# Patient Record
Sex: FEMALE | Race: WHITE | NOT HISPANIC OR LATINO | Employment: UNEMPLOYED | URBAN - METROPOLITAN AREA
[De-identification: names, ages, dates, MRNs, and addresses within clinical notes are randomized per-mention and may not be internally consistent; named-entity substitution may affect disease eponyms.]

---

## 2017-01-03 ENCOUNTER — APPOINTMENT (OUTPATIENT)
Dept: PHYSICAL THERAPY | Facility: CLINIC | Age: 57
End: 2017-01-03
Payer: MEDICARE

## 2017-01-03 PROCEDURE — G8978 MOBILITY CURRENT STATUS: HCPCS

## 2017-01-03 PROCEDURE — 97161 PT EVAL LOW COMPLEX 20 MIN: CPT

## 2017-01-03 PROCEDURE — 97140 MANUAL THERAPY 1/> REGIONS: CPT

## 2017-01-03 PROCEDURE — G8979 MOBILITY GOAL STATUS: HCPCS

## 2017-01-10 ENCOUNTER — ALLSCRIPTS OFFICE VISIT (OUTPATIENT)
Dept: OTHER | Facility: OTHER | Age: 57
End: 2017-01-10

## 2017-01-12 ENCOUNTER — ALLSCRIPTS OFFICE VISIT (OUTPATIENT)
Dept: OTHER | Facility: OTHER | Age: 57
End: 2017-01-12

## 2017-01-17 ENCOUNTER — ALLSCRIPTS OFFICE VISIT (OUTPATIENT)
Dept: OTHER | Facility: OTHER | Age: 57
End: 2017-01-17

## 2017-01-24 ENCOUNTER — ALLSCRIPTS OFFICE VISIT (OUTPATIENT)
Dept: OTHER | Facility: OTHER | Age: 57
End: 2017-01-24

## 2017-02-09 ENCOUNTER — GENERIC CONVERSION - ENCOUNTER (OUTPATIENT)
Dept: OTHER | Facility: OTHER | Age: 57
End: 2017-02-09

## 2017-02-14 ENCOUNTER — ALLSCRIPTS OFFICE VISIT (OUTPATIENT)
Dept: OTHER | Facility: OTHER | Age: 57
End: 2017-02-14

## 2017-02-21 ENCOUNTER — ALLSCRIPTS OFFICE VISIT (OUTPATIENT)
Dept: OTHER | Facility: OTHER | Age: 57
End: 2017-02-21

## 2017-02-28 ENCOUNTER — ALLSCRIPTS OFFICE VISIT (OUTPATIENT)
Dept: OTHER | Facility: OTHER | Age: 57
End: 2017-02-28

## 2017-03-07 ENCOUNTER — ALLSCRIPTS OFFICE VISIT (OUTPATIENT)
Dept: OTHER | Facility: OTHER | Age: 57
End: 2017-03-07

## 2017-03-21 ENCOUNTER — ALLSCRIPTS OFFICE VISIT (OUTPATIENT)
Dept: OTHER | Facility: OTHER | Age: 57
End: 2017-03-21

## 2017-03-28 ENCOUNTER — ALLSCRIPTS OFFICE VISIT (OUTPATIENT)
Dept: OTHER | Facility: OTHER | Age: 57
End: 2017-03-28

## 2017-04-10 ENCOUNTER — ALLSCRIPTS OFFICE VISIT (OUTPATIENT)
Dept: OTHER | Facility: OTHER | Age: 57
End: 2017-04-10

## 2017-04-11 ENCOUNTER — ALLSCRIPTS OFFICE VISIT (OUTPATIENT)
Dept: OTHER | Facility: OTHER | Age: 57
End: 2017-04-11

## 2017-04-18 ENCOUNTER — ALLSCRIPTS OFFICE VISIT (OUTPATIENT)
Dept: OTHER | Facility: OTHER | Age: 57
End: 2017-04-18

## 2017-04-25 ENCOUNTER — ALLSCRIPTS OFFICE VISIT (OUTPATIENT)
Dept: OTHER | Facility: OTHER | Age: 57
End: 2017-04-25

## 2017-05-02 ENCOUNTER — ALLSCRIPTS OFFICE VISIT (OUTPATIENT)
Dept: OTHER | Facility: OTHER | Age: 57
End: 2017-05-02

## 2017-05-09 ENCOUNTER — ALLSCRIPTS OFFICE VISIT (OUTPATIENT)
Dept: OTHER | Facility: OTHER | Age: 57
End: 2017-05-09

## 2017-05-23 ENCOUNTER — ALLSCRIPTS OFFICE VISIT (OUTPATIENT)
Dept: OTHER | Facility: OTHER | Age: 57
End: 2017-05-23

## 2017-05-30 ENCOUNTER — ALLSCRIPTS OFFICE VISIT (OUTPATIENT)
Dept: OTHER | Facility: OTHER | Age: 57
End: 2017-05-30

## 2017-06-06 ENCOUNTER — ALLSCRIPTS OFFICE VISIT (OUTPATIENT)
Dept: OTHER | Facility: OTHER | Age: 57
End: 2017-06-06

## 2017-06-13 ENCOUNTER — ALLSCRIPTS OFFICE VISIT (OUTPATIENT)
Dept: OTHER | Facility: OTHER | Age: 57
End: 2017-06-13

## 2017-06-20 ENCOUNTER — ALLSCRIPTS OFFICE VISIT (OUTPATIENT)
Dept: OTHER | Facility: OTHER | Age: 57
End: 2017-06-20

## 2017-06-27 ENCOUNTER — ALLSCRIPTS OFFICE VISIT (OUTPATIENT)
Dept: OTHER | Facility: OTHER | Age: 57
End: 2017-06-27

## 2017-07-11 ENCOUNTER — ALLSCRIPTS OFFICE VISIT (OUTPATIENT)
Dept: OTHER | Facility: OTHER | Age: 57
End: 2017-07-11

## 2017-07-19 ENCOUNTER — ALLSCRIPTS OFFICE VISIT (OUTPATIENT)
Dept: OTHER | Facility: OTHER | Age: 57
End: 2017-07-19

## 2017-07-19 ENCOUNTER — APPOINTMENT (OUTPATIENT)
Dept: LAB | Facility: CLINIC | Age: 57
End: 2017-07-19
Payer: MEDICARE

## 2017-07-19 DIAGNOSIS — F31.63 BIPOLAR DISORDER, CURRENT EPISODE MIXED, SEVERE, WITHOUT PSYCHOTIC FEATURES (HCC): ICD-10-CM

## 2017-07-19 DIAGNOSIS — G43.709 CHRONIC MIGRAINE WITHOUT AURA WITHOUT STATUS MIGRAINOSUS, NOT INTRACTABLE: ICD-10-CM

## 2017-07-19 DIAGNOSIS — K59.09 OTHER CONSTIPATION: ICD-10-CM

## 2017-07-19 DIAGNOSIS — R51 HEADACHE(784.0): ICD-10-CM

## 2017-07-19 DIAGNOSIS — M14.679 CHARCOT'S JOINT, UNSPECIFIED ANKLE AND FOOT: ICD-10-CM

## 2017-07-19 DIAGNOSIS — I10 ESSENTIAL (PRIMARY) HYPERTENSION: ICD-10-CM

## 2017-07-19 DIAGNOSIS — G89.4 CHRONIC PAIN SYNDROME: ICD-10-CM

## 2017-07-19 DIAGNOSIS — F41.9 ANXIETY DISORDER: ICD-10-CM

## 2017-07-19 DIAGNOSIS — G47.00 INSOMNIA: ICD-10-CM

## 2017-07-19 DIAGNOSIS — G44.40 DRUG-INDUCED HEADACHE, NOT ELSEWHERE CLASSIFIED, NOT INTRACTABLE: ICD-10-CM

## 2017-07-19 DIAGNOSIS — H81.10 BENIGN PAROXYSMAL VERTIGO: ICD-10-CM

## 2017-07-19 DIAGNOSIS — D72.819 DECREASED WHITE BLOOD CELL COUNT: ICD-10-CM

## 2017-07-19 DIAGNOSIS — M54.81 OCCIPITAL NEURALGIA: ICD-10-CM

## 2017-07-19 DIAGNOSIS — E78.5 HYPERLIPIDEMIA: ICD-10-CM

## 2017-07-19 DIAGNOSIS — E87.1 HYPO-OSMOLALITY AND HYPONATREMIA: ICD-10-CM

## 2017-07-19 LAB
ALBUMIN SERPL BCP-MCNC: 3.7 G/DL (ref 3.5–5)
ALP SERPL-CCNC: 87 U/L (ref 46–116)
ALT SERPL W P-5'-P-CCNC: 24 U/L (ref 12–78)
ANION GAP SERPL CALCULATED.3IONS-SCNC: 8 MMOL/L (ref 4–13)
AST SERPL W P-5'-P-CCNC: 17 U/L (ref 5–45)
BACTERIA UR QL AUTO: NORMAL /HPF
BASOPHILS # BLD AUTO: 0.02 THOUSANDS/ΜL (ref 0–0.1)
BASOPHILS NFR BLD AUTO: 1 % (ref 0–1)
BILIRUB SERPL-MCNC: 0.4 MG/DL (ref 0.2–1)
BILIRUB UR QL STRIP: NEGATIVE
BUN SERPL-MCNC: 12 MG/DL (ref 5–25)
CALCIUM SERPL-MCNC: 8.8 MG/DL (ref 8.3–10.1)
CHLORIDE SERPL-SCNC: 99 MMOL/L (ref 100–108)
CHOLEST SERPL-MCNC: 247 MG/DL (ref 50–200)
CLARITY UR: CLEAR
CO2 SERPL-SCNC: 29 MMOL/L (ref 21–32)
COLOR UR: YELLOW
CREAT SERPL-MCNC: 0.86 MG/DL (ref 0.6–1.3)
EOSINOPHIL # BLD AUTO: 0.22 THOUSAND/ΜL (ref 0–0.61)
EOSINOPHIL NFR BLD AUTO: 6 % (ref 0–6)
ERYTHROCYTE [DISTWIDTH] IN BLOOD BY AUTOMATED COUNT: 12.9 % (ref 11.6–15.1)
GFR SERPL CREATININE-BSD FRML MDRD: >60 ML/MIN/1.73SQ M
GLUCOSE P FAST SERPL-MCNC: 98 MG/DL (ref 65–99)
GLUCOSE UR STRIP-MCNC: NEGATIVE MG/DL
HCT VFR BLD AUTO: 41.6 % (ref 34.8–46.1)
HDLC SERPL-MCNC: 46 MG/DL (ref 40–60)
HGB BLD-MCNC: 14.5 G/DL (ref 11.5–15.4)
HGB UR QL STRIP.AUTO: NEGATIVE
HYALINE CASTS #/AREA URNS LPF: NORMAL /LPF
KETONES UR STRIP-MCNC: NEGATIVE MG/DL
LDLC SERPL CALC-MCNC: 164 MG/DL (ref 0–100)
LEUKOCYTE ESTERASE UR QL STRIP: ABNORMAL
LYMPHOCYTES # BLD AUTO: 1.09 THOUSANDS/ΜL (ref 0.6–4.47)
LYMPHOCYTES NFR BLD AUTO: 28 % (ref 14–44)
MCH RBC QN AUTO: 33.6 PG (ref 26.8–34.3)
MCHC RBC AUTO-ENTMCNC: 34.9 G/DL (ref 31.4–37.4)
MCV RBC AUTO: 96 FL (ref 82–98)
MONOCYTES # BLD AUTO: 0.4 THOUSAND/ΜL (ref 0.17–1.22)
MONOCYTES NFR BLD AUTO: 10 % (ref 4–12)
NEUTROPHILS # BLD AUTO: 2.13 THOUSANDS/ΜL (ref 1.85–7.62)
NEUTS SEG NFR BLD AUTO: 55 % (ref 43–75)
NITRITE UR QL STRIP: NEGATIVE
NON-SQ EPI CELLS URNS QL MICRO: NORMAL /HPF
NRBC BLD AUTO-RTO: 0 /100 WBCS
PH UR STRIP.AUTO: 7 [PH] (ref 4.5–8)
PLATELET # BLD AUTO: 250 THOUSANDS/UL (ref 149–390)
PMV BLD AUTO: 8.7 FL (ref 8.9–12.7)
POTASSIUM SERPL-SCNC: 4.5 MMOL/L (ref 3.5–5.3)
PROT SERPL-MCNC: 7.1 G/DL (ref 6.4–8.2)
PROT UR STRIP-MCNC: NEGATIVE MG/DL
RBC # BLD AUTO: 4.32 MILLION/UL (ref 3.81–5.12)
RBC #/AREA URNS AUTO: NORMAL /HPF
SODIUM SERPL-SCNC: 136 MMOL/L (ref 136–145)
SP GR UR STRIP.AUTO: 1.01 (ref 1–1.03)
TRIGL SERPL-MCNC: 187 MG/DL
TSH SERPL DL<=0.05 MIU/L-ACNC: 1.3 UIU/ML (ref 0.36–3.74)
UROBILINOGEN UR QL STRIP.AUTO: 0.2 E.U./DL
WBC # BLD AUTO: 3.87 THOUSAND/UL (ref 4.31–10.16)
WBC #/AREA URNS AUTO: NORMAL /HPF

## 2017-07-19 PROCEDURE — 84443 ASSAY THYROID STIM HORMONE: CPT

## 2017-07-19 PROCEDURE — 36415 COLL VENOUS BLD VENIPUNCTURE: CPT

## 2017-07-19 PROCEDURE — 80061 LIPID PANEL: CPT

## 2017-07-19 PROCEDURE — 85025 COMPLETE CBC W/AUTO DIFF WBC: CPT

## 2017-07-19 PROCEDURE — 81001 URINALYSIS AUTO W/SCOPE: CPT

## 2017-07-19 PROCEDURE — 80053 COMPREHEN METABOLIC PANEL: CPT

## 2017-07-25 ENCOUNTER — ALLSCRIPTS OFFICE VISIT (OUTPATIENT)
Dept: OTHER | Facility: OTHER | Age: 57
End: 2017-07-25

## 2017-08-01 ENCOUNTER — ALLSCRIPTS OFFICE VISIT (OUTPATIENT)
Dept: OTHER | Facility: OTHER | Age: 57
End: 2017-08-01

## 2017-08-15 ENCOUNTER — ALLSCRIPTS OFFICE VISIT (OUTPATIENT)
Dept: OTHER | Facility: OTHER | Age: 57
End: 2017-08-15

## 2017-08-22 ENCOUNTER — ALLSCRIPTS OFFICE VISIT (OUTPATIENT)
Dept: OTHER | Facility: OTHER | Age: 57
End: 2017-08-22

## 2017-08-29 ENCOUNTER — ALLSCRIPTS OFFICE VISIT (OUTPATIENT)
Dept: OTHER | Facility: OTHER | Age: 57
End: 2017-08-29

## 2017-09-12 ENCOUNTER — ALLSCRIPTS OFFICE VISIT (OUTPATIENT)
Dept: OTHER | Facility: OTHER | Age: 57
End: 2017-09-12

## 2017-09-19 ENCOUNTER — ALLSCRIPTS OFFICE VISIT (OUTPATIENT)
Dept: OTHER | Facility: OTHER | Age: 57
End: 2017-09-19

## 2017-09-26 ENCOUNTER — ALLSCRIPTS OFFICE VISIT (OUTPATIENT)
Dept: OTHER | Facility: OTHER | Age: 57
End: 2017-09-26

## 2017-10-10 ENCOUNTER — ALLSCRIPTS OFFICE VISIT (OUTPATIENT)
Dept: OTHER | Facility: OTHER | Age: 57
End: 2017-10-10

## 2017-10-16 ENCOUNTER — ALLSCRIPTS OFFICE VISIT (OUTPATIENT)
Dept: OTHER | Facility: OTHER | Age: 57
End: 2017-10-16

## 2017-10-17 NOTE — PROGRESS NOTES
Assessment  1  Acute bronchitis (466 0) (J20 9)    Plan  Acute bronchitis    · Start: Azithromycin 250 MG Oral Tablet; take 2 tablets on day 1 then 1 tablet daily x 4  days   · Start: MethylPREDNISolone 4 MG Oral Tablet Therapy Pack; take as directed  PMH: Acute wheezy bronchitis    · Renew: Promethazine-Codeine 6 25-10 MG/5ML Oral Syrup; 1 tsp every 6 hours as  needed for cough    Discussion/Summary    Symptoms for 2 weeks meets criteria for antibiotic treatement of bronchitisdo medrol dose packwith azithromycinfor promethazine with codeine orderedas needed1        1 Amended By: Su Morin; Oct 16 2017 11:13 AM EST    Chief Complaint  patient complains of cough for two weeks  History of Present Illness  HPI: here for cough and congesetionpast 2 weeks  has gotten worse over past few days and pressure in face cougn      Review of Systems    Constitutional: No fever, no chills, feels well, no tiredness, no recent weight gain or loss  ENT: nasal discharge, but-- as noted in HPI  Cardiovascular: no complaints of slow or fast heart rate, no chest pain, no palpitations, no leg claudication or lower extremity edema  Respiratory: cough, but-- as noted in HPI  Breasts: no complaints of breast pain, breast lump or nipple discharge  Gastrointestinal: no complaints of abdominal pain, no constipation, no nausea or diarrhea, no vomiting, no bloody stools  Genitourinary: no complaints of dysuria, no incontinence, no pelvic pain, no dysmenorrhea, no vaginal discharge or abnormal vaginal bleeding  Musculoskeletal: no complaints of arthralgia, no myalgia, no joint swelling or stiffness, no limb pain or swelling  Integumentary: no complaints of skin rash or lesion, no itching or dry skin, no skin wounds  Neurological: no complaints of headache, no confusion, no numbness or tingling, no dizziness or fainting  Active Problems  1  Anxiety disorder (300 00) (F41 9)  2   Bipolar I disorder, most recent episode mixed, severe without psychotic features (296 63)   (F31 63)  3  BPPV (benign paroxysmal positional vertigo) (386 11) (H81 10)  4  Charcot's joint of foot (349 9,713 5) (M14 679)  5  Chronic constipation (564 00) (K59 09)  6  Chronic headaches (784 0) (R51)  7  Chronic migraine without aura (346 70) (G43 709)  8  Essential hypertension (401 9) (I10)  9  Headache (784 0) (R51)  10  Hyperlipidemia (272 4) (E78 5)  11  Hyponatremia (276 1) (E87 1)  12  Insomnia (780 52) (G47 00)  13  Leukopenia (288 50) (D72 819)  14  Medication overuse headache (339 3) (G44 40)  15  Occipital neuralgia (723 8) (M54 81)  16  Pain syndrome, chronic (338 4) (G89 4)    Past Medical History  1  History of Acute wheezy bronchitis (466 0) (J20 9)  2  History of Anxiety (300 00) (F41 9)  3  History of Asthmatic bronchitis with exacerbation (493 92) (J45 901)  4  History of Colonoscopy (Fiberoptic) Screening  5  History of Diabetes Mellitus (250 00)  6  History of depression (V11 8) (Z86 59)  7  History of hyperlipidemia (V12 29) (Z86 39)  8  History of hypertension (V12 59) (Z86 79)  9  History of Other headache syndrome (339 89) (G44 89)  Active Problems And Past Medical History Reviewed: The active problems and past medical history were reviewed and updated today  Family History  Father   1  Family history of Heart Disease (V17 49)  2  Family history of Prostate Cancer (V16 42)  Maternal Grandmother   3  Family history of Acute Myocardial Infarction (V17 3)  4  Family history of Maternal Grandmother Is   Paternal Grandmother   11  Family history of Breast Cancer (V16 3)  6  Family history of Paternal Grandmother Is   Maternal Grandfather   7  Family history of Colon Cancer (V16 0)  8  Family history of Maternal Grandfather Is   Paternal Grandfather   5  Family history of Acute Myocardial Infarction (V17 3)  10  Family history of Paternal Grandfather Is   Family History   11   Family history of Coronary Artery Disease (V17 49)  12  Family history of Diabetes Mellitus (V18 0)  Family History Reviewed: The family history was reviewed and updated today  Social History   · Denied: History of Alcohol Use (History)   · Caffeine use (V49 89) (F15 90)   · Current Every Day Smoker (305 1)   · Daily Coffee Consumption (1  Cups/Day)   · Denied: History of Drug Use   · Lives with spouse   · Marital History - Currently    · Denied: History of Uses  drugs   · Denied: History of Uses marijuana  The social history was reviewed and updated today  The social history was reviewed and is unchanged  Surgical History  1  History of Knee Surgery  2  History of Tonsillectomy  Surgical History Reviewed: The surgical history was reviewed and updated today  Current Meds  1  Atenolol-Chlorthalidone 50-25 MG Oral Tablet; 1/2 tab daily  Requested for: 30IFL0299;   Last Rx:46Qbb7081 Ordered  2  BuPROPion HCl ER (XL) 300 MG Oral Tablet Extended Release 24 Hour; Take 1 tablet   daily; Therapy: 37Wfw8280 to (Evaluate:17Aug2016)  Requested for: 64YLM9734; Last   Rx:53Qvp4087 Ordered  3  Epitol 200 MG Oral Tablet; TAKE 3 TABLET Bedtime; Therapy: 08Ipa6829 to (Evaluate:14Oct2014)  Requested for: 57QSB4942; Last   Rx:35Oqo6812 Ordered  4  Fish Oil 1000 MG Oral Capsule; Take 1 capsule twice daily; Therapy: (Recorded:08Oct2015) to Recorded  5  LORazepam 1 MG Oral Tablet; take 1- 1/2 every 4 hours prn; Therapy: 71DCW3340 to (Evaluate:68Drk9936); Last Rx:67Svh8933 Ordered  6  RisperiDONE 1 MG Oral Tablet; TAKE 1 TABLET AT BEDTIME; Therapy: 77URU2509 to (Evaluate:11Nov2014)  Requested for: 21Lyt5677; Last   XI:86FOY8394; Status: ACTIVE - Renewal Denied Ordered  7  Sertraline HCl - 100 MG Oral Tablet; TAKE 1 TABLET DAILY AS DIRECTED; Therapy: 10SUC8105 to (Evaluate:11Nov2014)  Requested for: 52AJD4190; Last   Rx:13Dqp3815 Ordered  8  Simvastatin 20 MG Oral Tablet;  Take 1 tablet daily  Requested for: 24Nov2016; Last   Rx:06Gpi2997; Status: ACTIVE - Renewal Denied Ordered  9  Ziprasidone HCl - 60 MG Oral Capsule; Take 2 Daily with dinner; Therapy: 37ZKA1815 to (Evaluate:25Lej0622)  Requested for: 43GYP6724; Last   Rx:62Zpi8572 Ordered    The medication list was reviewed and updated today  Allergies  1  No Known Drug Allergies    Vitals   Recorded: 29AWC4092 10:35AM   Temperature 98 2 F, Tympanic   Heart Rate 67   Respiration 17   Systolic 551, LUE, Sitting   Diastolic 78, LUE, Sitting   BP CUFF SIZE Large   Height 5 ft 5 in   Weight 215 lb    BMI Calculated 35 78   BSA Calculated 2 04     Physical Exam    Constitutional   General appearance: No acute distress, well appearing and well nourished  Ears, Nose, Mouth, and Throat   Otoscopic examination: Tympanic membranes translucent with normal light reflex  Canals patent without erythema  Oropharynx: Normal with no erythema, edema, exudate or lesions  Pulmonary   Respiratory effort: No increased work of breathing or signs of respiratory distress  Auscultation of lungs: Clear to auscultation  Cardiovascular   Auscultation of heart: Normal rate and rhythm, normal S1 and S2, without murmurs  Carotid pulses: Normal     Abdomen   Abdomen: Non-tender, no masses  Liver and spleen: No hepatomegaly or splenomegaly  Musculoskeletal   Gait and station: Normal     Digits and nails: Normal without clubbing or cyanosis      Inspection/palpation of joints, bones, and muscles: Normal          Future Appointments    Date/Time Provider Specialty Site   10/24/2017 09:00 AM Geri Aguirre [facility] Psychiatry [facility]   10/31/2017 09:00 AM Geri Aguirre [facility] Psychiatry [facility] [address] THERAPISTS     Signatures   Electronically signed by : Galilea Castaneda DO; Oct 16 2017 11:14AM EST                       (Author)

## 2017-10-24 ENCOUNTER — ALLSCRIPTS OFFICE VISIT (OUTPATIENT)
Dept: OTHER | Facility: OTHER | Age: 57
End: 2017-10-24

## 2017-11-07 ENCOUNTER — ALLSCRIPTS OFFICE VISIT (OUTPATIENT)
Dept: OTHER | Facility: OTHER | Age: 57
End: 2017-11-07

## 2017-11-14 ENCOUNTER — ALLSCRIPTS OFFICE VISIT (OUTPATIENT)
Dept: OTHER | Facility: OTHER | Age: 57
End: 2017-11-14

## 2017-11-21 ENCOUNTER — ALLSCRIPTS OFFICE VISIT (OUTPATIENT)
Dept: OTHER | Facility: OTHER | Age: 57
End: 2017-11-21

## 2017-11-28 ENCOUNTER — ALLSCRIPTS OFFICE VISIT (OUTPATIENT)
Dept: OTHER | Facility: OTHER | Age: 57
End: 2017-11-28

## 2017-12-12 ENCOUNTER — ALLSCRIPTS OFFICE VISIT (OUTPATIENT)
Dept: OTHER | Facility: OTHER | Age: 57
End: 2017-12-12

## 2017-12-19 ENCOUNTER — ALLSCRIPTS OFFICE VISIT (OUTPATIENT)
Dept: OTHER | Facility: OTHER | Age: 57
End: 2017-12-19

## 2017-12-27 ENCOUNTER — GENERIC CONVERSION - ENCOUNTER (OUTPATIENT)
Dept: OTHER | Facility: OTHER | Age: 57
End: 2017-12-27

## 2018-01-02 ENCOUNTER — ALLSCRIPTS OFFICE VISIT (OUTPATIENT)
Dept: OTHER | Facility: OTHER | Age: 58
End: 2018-01-02

## 2018-01-09 NOTE — PSYCH
Progress Note  Psychotherapy Provided [facility]: Individual Psychotherapy 45 minutes provided today  Goals addressed in session:   Goals: 2  DChan Kellogg stated that she continues to work on taking time for herself  She stated that she tries to read when she has the opportunity  Discussing the stress of watching her grandchildren and ways to place more responsibility on her   Continuing to work on processing emotions rather than holding them in  Giving supportive therapy  A- Progress- Continuing to process her emotions  P- Continue treatment       Pain Scale and Suicide Risk [facility]: Current Pain Assessment: moderate to severe   On a scale of 0 to 10, the patient rates current pain at 4   Behavioral Health Treatment Plan [facility]: Diagnosis and Treatment Plan explained to patient, patient relates understanding diagnosis and is agreeable to Treatment Plan  Assessment    1   Bipolar I disorder, most recent episode mixed, severe without psychotic features   (296 63) (F31 63)    Signatures   Electronically signed by : Kaniak Cerda LCSW; May  9 2017 10:48AM EST                       (Author)

## 2018-01-09 NOTE — PSYCH
Progress Note  Psychotherapy Provided [facility]: Individual Psychotherapy 45 minutes provided today  Goals addressed in session:   Goals: 2  D- Bess stated that she continues to mourn the loss of her ferrets  continuing to process her emotions regarding her loss  Also continuing to work on use of healthy coping mechanisms for depression  Bess expressing her frustrations with her  and the fact that he helps very little with the grandchildren and curses in front of them  Discussing ways to express her feelings to him  Giving supportive therapy  A- Progress - Continuing to process her emotions  P- Continue treatment       Pain Scale and Suicide Risk [facility]: Current Pain Assessment: moderate to severe   On a scale of 0 to 10, the patient rates current pain at 5   Behavioral Health Treatment Plan [facility]: Diagnosis and Treatment Plan explained to patient, patient relates understanding diagnosis and is agreeable to Treatment Plan  Assessment    1   Bipolar I disorder, most recent episode mixed, severe without psychotic features   (296 63) (F31 63)    Signatures   Electronically signed by : Stefan Altman LCSW; Oct 25 2016 11:40AM EST                       (Author)

## 2018-01-09 NOTE — PSYCH
Progress Note  Psychotherapy Provided [facility]: Individual Psychotherapy 45 minutes provided today  Goals addressed in session:   Goals: 2  DChan Kellogg stated that she continues to worry about her ferrets becoming ill  She stated that the one had another episode that concerned her  Discussing ways for her to decrease her anxiety over the ferrets  Also discussing the level of care that she gives them that give them a comfortable and happy life  Continuing to work on use of positive coping mechanisms for depression and anxiety  Giving supportive therapy  A- Progress - COntinuing to work on reducing anxiety and depression  P- Continue treatment       Pain Scale and Suicide Risk [facility]: On a scale of 0 to 10, the patient rates current pain at 3   Behavioral Health Treatment Plan [facility]: Diagnosis and Treatment Plan explained to patient, patient relates understanding diagnosis and is agreeable to Treatment Plan  Assessment    1   Bipolar I disorder, most recent episode mixed, severe without psychotic features   (296 63) (F31 63)    Signatures   Electronically signed by : Von Beck LCSW; Nov 8 2016 12:53PM EST                       (Author)

## 2018-01-09 NOTE — PSYCH
Treatment Plan Tracking      #2 Treatment Plan not completed within required time limits due to: Client presented with emotional/behavioral issues that required clinical intervention          Signatures   Electronically signed by : Isi Olguin LCSW; May 24 2016 11:55AM EST                       (Author)

## 2018-01-09 NOTE — PSYCH
Message  Message Free Text Note Form: Left voicemail message regarding apointment      Active Problems    1  Acute conjunctivitis (372 00) (H10 30)   2  Anxiety disorder (300 00) (F41 9)   3  Bipolar I disorder, most recent episode mixed, severe without psychotic features   (296 63) (F31 63)   4  BPPV (benign paroxysmal positional vertigo) (386 11) (H81 10)   5  Charcot's joint of foot (094 0,713 5) (M14 679)   6  Chronic constipation (564 00) (K59 09)   7  Chronic headaches (784 0) (R51)   8  Chronic migraine without aura (346 70) (G43 709)   9  Encounter for screening mammogram for breast cancer (V76 12) (Z12 31)   10  Essential hypertension (401 9) (I10)   11  Headache (784 0) (R51)   12  Hyperlipidemia (272 4) (E78 5)   13  Hyponatremia (276 1) (E87 1)   14  Insomnia (780 52) (G47 00)   15  Leukopenia (288 50) (D72 819)   16  Medication overuse headache (339 3) (G44 40)   17  Occipital neuralgia (723 8) (M54 81)   18  Pain syndrome, chronic (338 4) (G89 4)   19  Screening for colon cancer (V76 51) (Z12 11)   20  Surgery, elective (V50 9) (Z41 9)    Current Meds   1  Atenolol-Chlorthalidone 50-25 MG Oral Tablet; 1/2 tab daily  Requested for: 79CES9405;   Last Rx:19Jan2016 Ordered   2  BuPROPion HCl ER (XL) 300 MG Oral Tablet Extended Release 24 Hour; Take 1 tablet   daily; Therapy: 84Zjh5403 to (Evaluate:17Aug2016)  Requested for: 69LMZ0471; Last   Rx:09Etp8898 Ordered   3  Epitol 200 MG Oral Tablet; TAKE 3 TABLET Bedtime; Therapy: 27Cix4278 to (Evaluate:14Oct2014)  Requested for: 71KJV4941; Last   Rx:81Ffy8369 Ordered   4  Fish Oil 1000 MG Oral Capsule; Take 1 capsule twice daily; Therapy: (Recorded:08Oct2015) to Recorded   5  LORazepam 1 MG Oral Tablet; take 1- 1/2 every 4 hours prn; Therapy: 97QSG9999 to (Evaluate:12Sep2014); Last Rx:80Mdd6787 Ordered   6  Meclizine HCl - 25 MG Oral Tablet; TAKE 1 TABLET 3 TIMES DAILY AS NEEDED;    Therapy: 66AQC8703 to (Evaluate:12Jan2017)  Requested for: 41Lqf9269; Last   Rx:76Ehl3894 Ordered   7  Polymyxin B-Trimethoprim 77784-8 1 UNIT/ML-% Ophthalmic Solution (Polytrim); INSTILL 1 DROP INTO BOTH EYES 4 TIMES DAILY; Therapy: 74Xgu1683 to (Evaluate:12Uch8887); Last Rx:35Xfq8971 Ordered   8  RisperiDONE 1 MG Oral Tablet; TAKE 1 TABLET AT BEDTIME; Therapy: 89JBI8930 to (Evaluate:2014)  Requested for: 2015; Last   O57UOA3919; Status: ACTIVE - Renewal Denied Ordered   9  Sertraline HCl - 100 MG Oral Tablet; TAKE 1 TABLET DAILY AS DIRECTED; Therapy: 52UME7730 to (Evaluate:2014)  Requested for: 47MQT2414; Last   Rx:51Rlj6850 Ordered   10  Simvastatin 20 MG Oral Tablet; Take 1 tablet daily  Requested for: 2016; Last    Rx:78Nwq4472; Status: ACTIVE - Renewal Denied Ordered   11  Ziprasidone HCl - 60 MG Oral Capsule; Take 2 Daily with dinner; Therapy: 59ZVY5064 to (Evaluate:2016)  Requested for: 87SUM3459; Last    Rx:21Vgs6518 Ordered    Allergies    1   No Known Drug Allergies    Signatures   Electronically signed by : Marjorie Walsh LCSW; 2017  9:42AM EST                       (Author)

## 2018-01-10 NOTE — PSYCH
Treatment Plan Tracking      #2 Treatment Plan not completed within required time limits due to: Client presented with emotional/behavioral issues that required clinical intervention          Signatures   Electronically signed by : Jose Lundberg LCSW; May 24 2016 11:51AM EST                       (Author)

## 2018-01-10 NOTE — PSYCH
Treatment Plan Tracking    #1 Treatment Plan not completed within required time limits due to: Client presented with emotional/behavioral issues that required clinical intervention            Signatures   Electronically signed by : Emery Thompson LCSW; Oct 24 2017 12:49PM EST                       (Author)

## 2018-01-10 NOTE — PSYCH
Progress Note  Psychotherapy Provided [facility]: Individual Psychotherapy 45 minutes provided today  Goals addressed in session:   Goals: 2  DChan Kellogg stated that she has been frustrated with her   She shared that he helps very little around the house or with the grandchildren, however takes credit for their care  PT processing her emotions  Discussing ways to cope with her husbands behavior and for her to be able to take credit for the work she does  COntinuing to work on use of positive thought  Giving supportive therapy  A- Progress - COntinuing to process her emotions  P- COntinue treatment       Pain Scale and Suicide Risk [facility]: Current Pain Assessment: moderate to severe   On a scale of 0 to 10, the patient rates current pain at 4   Current suicide risk is low   Behavioral Health Treatment Plan [facility]: Diagnosis and Treatment Plan explained to patient, patient relates understanding diagnosis and is agreeable to Treatment Plan  Assessment    1   Bipolar I disorder, most recent episode mixed, severe without psychotic features   (296 63) (F31 63)    Signatures   Electronically signed by : Rik George LCSW; Mar 15 2016  4:26PM EST                       (Author)

## 2018-01-10 NOTE — PSYCH
Treatment Plan Tracking    #1 Treatment Plan not completed within required time limits due to: Client presented with emotional/behavioral issues that required clinical intervention            Signatures   Electronically signed by : Kenji Schneider LCSW; Aug 30 2017 10:10AM EST                       (Author)

## 2018-01-10 NOTE — PSYCH
Treatment Plan Tracking      #2 Treatment Plan not completed within required time limits due to: Client presented with emotional/behavioral issues that required clinical intervention          Signatures   Electronically signed by : Martha Garcia LCSW; May 24 2016 11:50AM EST                       (Author)

## 2018-01-10 NOTE — PSYCH
Progress Note  Psychotherapy Provided [facility]: Individual Psychotherapy 45 minutes provided today  Goals addressed in session:   Goals: 2 & 3  D- Bess stated that she continues to deal with the stress of remodeling her kitchen  Discussing ways to reduce the stress and be able to ask for help from family when necessary  Continuing to work on coping with her headache pain  Giving supportive therapy  A- Progress - Continuing to work on reducing stress in her life  P- Continue treatment       Pain Scale and Suicide Risk [facility]: Current Pain Assessment: moderate to severe   On a scale of 0 to 10, the patient rates current pain at 6   Behavioral Health Treatment Plan [facility]: Diagnosis and Treatment Plan explained to patient, patient relates understanding diagnosis and is agreeable to Treatment Plan  Assessment    1   Bipolar I disorder, most recent episode mixed, severe without psychotic features   (296 63) (F31 63)    Signatures   Electronically signed by : Colin Covarrubias LCSW; Sep 12 2017 11:28AM EST                       (Author)

## 2018-01-11 NOTE — PSYCH
Treatment Plan Tracking      #2 Treatment Plan not completed within required time limits due to: Client presented with emotional/behavioral issues that required clinical intervention          Signatures   Electronically signed by : Pee Covarrubias LCSW; May 24 2016 11:54AM EST                       (Author)

## 2018-01-11 NOTE — PSYCH
Treatment Plan Tracking        #3 Treatment Plan not completed within required time limits due to: Client presented with emotional/behavioral issues that required clinical intervention        Signatures   Electronically signed by : Ailyn Berman LCSW; Nov 15 2016 11:06AM EST                       (Author)

## 2018-01-11 NOTE — PSYCH
Treatment Plan Tracking      #2 Treatment Plan not completed within required time limits due to: Client presented with emotional/behavioral issues that required clinical intervention          Signatures   Electronically signed by : Tong Aguilar, CARMEN; May 24 2016 11:54AM EST                       (Author)

## 2018-01-11 NOTE — PSYCH
Progress Note  Psychotherapy Provided [facility]: Individual Psychotherapy 45 minutes provided today  Goals addressed in session:   Goals: 2 & 3  D- Bess stated that her daughter went back to work after her maternity leave, so she is now watching both grandchildren  PT feels that this occupies her time in a positive way wand wards off intrusive thoughts and suicidal ideation  PT discussing frustration with her  and his tendency to spend as well as constant physical complaints  Discussing ways to cope with husbands behavior as well as ways to practice self care  Giving supportive therapy  A- Progress - Continuing to utilize healthy coping mechanisms  P- COntinue treatment   Pain Scale and Suicide Risk [facility]: Current Pain Assessment: moderate to severe   On a scale of 0 to 10, the patient rates current pain at 3   Current suicide risk is low   Behavioral Health Treatment Plan [facility]: Diagnosis and Treatment Plan explained to patient, patient relates understanding diagnosis and is agreeable to Treatment Plan  Assessment    1   Bipolar I disorder, most recent episode mixed, severe without psychotic features   (296 63) (F31 63)    Signatures   Electronically signed by : Stefan Altman LCSW; Mar  1 2016 11:03AM EST                       (Author)

## 2018-01-11 NOTE — PSYCH
Treatment Plan Tracking        #3 Treatment Plan not completed within required time limits due to: Client presented with emotional/behavioral issues that required clinical intervention        Signatures   Electronically signed by : Kriss Rogers LCSW; Jan 24 2017  2:44PM EST                       (Author)

## 2018-01-11 NOTE — PSYCH
Progress Note  Psychotherapy Provided [facility]: Individual Psychotherapy 45 minutes provided today  Goals addressed in session:   Goals: 1 & 2  CLEMENT Kellogg stated that she is extremely sad due to the death of one of her ferrets  She shared that this is the fourth one that she lost in the last year  Processing her emotions and discussing ways for her to deal with her loss  she is also experiencing an increasing headache pain  Discussing ways for her to cope with the pain and practice self care  Giving supportive therapy  A- Progress - COntinuing to process her emotions  P- Continue treatment       Pain Scale and Suicide Risk  [facility]: Current Pain Assessment: moderate to severe   On a scale of 0 to 10, the patient rates current pain at 7   Behavioral Health Treatment Plan [facility]: Diagnosis and Treatment Plan explained to patient, patient relates understanding diagnosis and is agreeable to Treatment Plan  Assessment    1   Bipolar I disorder, most recent episode mixed, severe without psychotic features   (296 63) (F31 63)    Signatures   Electronically signed by : Von Beck LCSW; Sep 13 2016 10:27AM EST                       (Author)

## 2018-01-11 NOTE — PSYCH
Treatment Plan Tracking    #1 Treatment Plan not completed within required time limits due to: Client presented with emotional/behavioral issues that required clinical intervention            Signatures   Electronically signed by : Mk Longoria LCSW; Aug 22 2017 11:11AM EST                       (Author)

## 2018-01-11 NOTE — PSYCH
Progress Note  Psychotherapy Provided [facility]: Individual Psychotherapy 45 minutes provided today  Goals addressed in session:   Goals: 2  CLEMENT Kellogg stated that she is extremely sad because another one of her ferrets passed away  She stated that they found her dead when they returned home from the appointment last week  processing her emotions and discussing ways for her to cope with the recent loss of her pets  Stephanie Maravilla stated that she continues to try to focus on her grandchildren to remain positive  Giving supportive therapy  A- Progress- Continuing to process her emotions  P- Continue treatment       Pain Scale and Suicide Risk [facility]: Current Pain Assessment: moderate to severe   On a scale of 0 to 10, the patient rates current pain at 5   Behavioral Health Treatment Plan [facility]: Diagnosis and Treatment Plan explained to patient, patient relates understanding diagnosis and is agreeable to Treatment Plan  Assessment    1   Bipolar I disorder, most recent episode mixed, severe without psychotic features   (296 63) (F31 63)    Signatures   Electronically signed by : Lebron Corbin LCSW; Sep 27 2016 12:08PM EST                       (Author)

## 2018-01-11 NOTE — PSYCH
Progress Note  Psychotherapy Provided [facility]: Individual Psychotherapy 45 minutes provided today  Goals addressed in session:   Goals: 1, 2 & 3  D- Bess stated that she has been having a lot of break through pain with her headaches  She stated that she feel the stress of losing the ferrets has contributed to the pain  Discussing ways for her to cope with the loss  Also discussing the importance of increasing self care  Reviewing and renewing her treatment plan  Giving supportive therapy  A- progress - Continuing to process her emotions  P- Continue treatment       Pain Scale and Suicide Risk [facility]: Current Pain Assessment: moderate to severe   On a scale of 0 to 10, the patient rates current pain at 5   Behavioral Health Treatment Plan [address] [place] [address] [address]: Diagnosis and Treatment Plan explained to patient, patient relates understanding diagnosis and is agreeable to Treatment Plan  Assessment    1   Bipolar I disorder, most recent episode mixed, severe without psychotic features   (296 63) (F31 63)    Signatures   Electronically signed by : Rhiannon Xie LCSW; Feb 15 2017 12:36PM EST                       (Author)

## 2018-01-11 NOTE — PSYCH
Progress Note  Psychotherapy Provided [facility]: Individual Psychotherapy 45 minutes provided today  Goals addressed in session:   Goals: 2  D- Bess stated that things have been somewhat stressful  She shared that she attended both a birthday party and a shower this weekend  Discussing her anxiety regarding these events and ways to continue to reduce her anxiety in social situations  Also discussing the stress related to baby-sitting the grandchildren and ways to continue to practice self care  Giving supportive therapy  A- PRogress - Continuing to process her emotions  P-Continue treatment       Pain Scale and Suicide Risk [facility]: Current Pain Assessment: moderate to severe   On a scale of 0 to 10, the patient rates current pain at 5   Behavioral Health Treatment Plan [facility]: Diagnosis and Treatment Plan explained to patient, patient relates understanding diagnosis and is agreeable to Treatment Plan  Assessment    1   Bipolar I disorder, most recent episode mixed, severe without psychotic features   (296 63) (F31 63)    Signatures   Electronically signed by : Isi Olguin LCSW; Oct 24 2017 12:48PM EST                       (Author)

## 2018-01-11 NOTE — PSYCH
Progress Note  Psychotherapy Provided [facility]: Individual Psychotherapy 45 minutes provided today  Goals addressed in session:   Goals: 2  D- Bess stated that she has been experiencing financial stress  Discussing the circumstances and ways to problem solve the issues  Also continuing to discuss increasing self care as a form of coping  Giving supportive therapy  A- PRogress - Continuing to process her emotions   P-Continue treatment       Pain Scale and Suicide Risk [facility]: Current Pain Assessment: moderate to severe   On a scale of 0 to 10, the patient rates current pain at 5   Behavioral Health Treatment Plan [facility]: Diagnosis and Treatment Plan explained to patient, patient relates understanding diagnosis and is agreeable to Treatment Plan  Assessment    1   Bipolar I disorder, most recent episode mixed, severe without psychotic features   (296 63) (F31 63)    Signatures   Electronically signed by : Martha Garcia LCSW; Jul 25 2017  2:10PM EST                       (Author)

## 2018-01-11 NOTE — PSYCH
Treatment Plan Tracking    #1 Treatment Plan not completed within required time limits due to: Client presented with emotional/behavioral issues that required clinical intervention            Signatures   Electronically signed by : Colin Covarrubias LCSW; Nov 22 2017  9:41AM EST                       (Author)

## 2018-01-11 NOTE — PSYCH
Progress Note  Psychotherapy Provided [facility]: Individual Psychotherapy 45 minutes provided today  Goals addressed in session:   Goals: 2  D- Bess stated that she continues to mourn the loss of her ferrets  She shared that another one had appeared ill, but that she was able to tend to him and that he appears to have recovered  Processing her emotions and discussing ways for her to cope with the stress in her life  Continuing to work on asserting herself with her  and expressing her emotions  Giving supportive therapy  A- Progress Continuing to process her emotions  P- Continue treatment       Pain Scale and Suicide Risk [facility]: Current Pain Assessment: moderate to severe   On a scale of 0 to 10, the patient rates current pain at 5   Behavioral Health Treatment Plan [facility]: Diagnosis and Treatment Plan explained to patient, patient relates understanding diagnosis and is agreeable to Treatment Plan  Assessment    1   Bipolar I disorder, most recent episode mixed, severe without psychotic features   (296 63) (F31 63)    Signatures   Electronically signed by : Rik George LCSW; Oct  6 2016  2:38PM EST                       (Author)

## 2018-01-11 NOTE — PSYCH
Progress Note  Psychotherapy Provided [facility]: Individual Psychotherapy 45 minutes provided today  Goals addressed in session:   Goals: 2  D- Bess stated that she and her  have undertaken a large project home which has been causing stress  Discussing ways for her to reduce the stress of the project  ALso continuing to discuss the importance of self care with her day to day stress  Giving supportive therapy  A- PRogress - Continuing to process her emotions  P-Continue treatment       Pain Scale and Suicide Risk [facility]: Current Pain Assessment: moderate to severe   On a scale of 0 to 10, the patient rates current pain at 5   Behavioral Health Treatment Plan [facility]: Diagnosis and Treatment Plan explained to patient, patient relates understanding diagnosis and is agreeable to Treatment Plan  Assessment    1   Bipolar I disorder, most recent episode mixed, severe without psychotic features   (296 63) (F31 63)    Signatures   Electronically signed by : Marjorie Walsh LCSW; Aug 22 2017 11:11AM EST                       (Author)

## 2018-01-11 NOTE — PSYCH
Progress Note  Psychotherapy Provided [facility]: Individual Psychotherapy 45 minutes provided today  Goals addressed in session:   Goals: 1  CLEMENT Kellogg stated that she has been doing well over the last several weeks  She discussed her frustrations with her  and his lack of participation in helping with the grandchildren  Discussing ways for her to communicate her feelings to him as well as ways to hold him accountable for his behaviors  Continuing to work on increasing self care  Giving supportive therapy  A- progress - COntinuing to work on increasing self care  P- Continue treatment       Pain Scale and Suicide Risk [facility]: Current Pain Assessment: moderate to severe   On a scale of 0 to 10, the patient rates current pain at 4   Behavioral Health Treatment Plan [facility]: Diagnosis and Treatment Plan explained to patient, patient relates understanding diagnosis and is agreeable to Treatment Plan  Assessment    1   Bipolar I disorder, most recent episode mixed, severe without psychotic features   (296 63) (F31 63)    Signatures   Electronically signed by : David Newman LCSW; Jul 19 2016  1:22PM EST                       (Author)

## 2018-01-11 NOTE — PSYCH
Progress Note  Psychotherapy Provided [facility]: Individual Psychotherapy 45 minutes provided today  Goals addressed in session:   Goals: 2  DChan tapia stated that she has been doing well, but has a cough that she will be seeing her PCP for today  PT continues to enjoy time with her grandchildren  Discussing ongoing physical issues that she continues to struggle with  discussing effective ways to manage her medical issues  Also continuing to work on practicing self care  PT expressing concern over being able to man age both grandchildren when her daughter returns to work  Discussing ways to be able to do this effectively  Giving supportive therapy  A- Progress- Continuing to work on practicing self care  P- Continue treatment       Pain Scale and Suicide Risk [facility]: On a scale of 0 to 10, the patient rates current pain at 3   Current suicide risk is low   Behavioral Health Treatment Plan [facility]: Diagnosis and Treatment Plan explained to patient, patient relates understanding diagnosis and is agreeable to Treatment Plan  Assessment    1   Bipolar I disorder, most recent episode mixed, severe without psychotic features   (296 63) (F31 63)    Signatures   Electronically signed by : Celina Quiroga LCSW; Jan 19 2016 10:28AM EST                       (Author)

## 2018-01-12 VITALS
DIASTOLIC BLOOD PRESSURE: 88 MMHG | WEIGHT: 216 LBS | HEART RATE: 80 BPM | SYSTOLIC BLOOD PRESSURE: 168 MMHG | BODY MASS INDEX: 35.99 KG/M2 | HEIGHT: 65 IN

## 2018-01-12 VITALS
SYSTOLIC BLOOD PRESSURE: 132 MMHG | HEIGHT: 65 IN | WEIGHT: 216.5 LBS | DIASTOLIC BLOOD PRESSURE: 82 MMHG | BODY MASS INDEX: 36.07 KG/M2 | HEART RATE: 80 BPM

## 2018-01-12 NOTE — PSYCH
Progress Note  Psychotherapy Provided [facility]: Individual Psychotherapy 45 minutes provided today  Goals addressed in session:   Goals: 2  D- Bess stated that she continues to expereince stress in her life  She continues to worry about fianances going into the holidays as well as time to get everything done  ALso cintinuing to cope with the breakthrough pain from her headaches  Continuing to work on use of healthy coping skills for stress as well as continuing to increase self care  Giving supportive therpay  A - Progress - Continuing to process her emotions  P-Continue treatmetn       Pain Scale and Suicide Risk [facility]: Current Pain Assessment: moderate to severe   On a scale of 0 to 10, the patient rates current pain at 5   Behavioral Health Treatment Plan [facility]: Diagnosis and Treatment Plan explained to patient, patient relates understanding diagnosis and is agreeable to Treatment Plan  Assessment    1   Bipolar I disorder, most recent episode mixed, severe without psychotic features   (296 63) (F31 63)    Signatures   Electronically signed by : Martha Garcia LCSW; Nov 22 2017  9:40AM EST                       (Author)

## 2018-01-12 NOTE — PSYCH
Treatment Plan Tracking        #3 Treatment Plan not completed within required time limits due to: Client presented with emotional/behavioral issues that required clinical intervention        Signatures   Electronically signed by : Anita Robles LCSW; Nov 8 2016 12:58PM EST                       (Author)

## 2018-01-12 NOTE — PSYCH
Progress Note  Psychotherapy [facility]: Individual Psychotherapy 45 minutes provided today  Goals addressed in session:   Goals: 2 & 3  D- Bess stated that she continues to experience break through pain with her headaches which can be severe at time  She also stated that it can be difficult to function with her responsibility of the grandchildren  Discussing ways to cope with the pain and practice self care  Giving supportive therapy  A- PRogress - Continuing to process her emotions and work on increasing self care  P-Continue treatment       Pain Scale and Suicide Risk [facility]: Current Pain Assessment: moderate to severe   On a scale of 0 to 10, the patient rates current pain at 5   Behavioral Health Treatment Plan [facility]: Diagnosis and Treatment Plan explained to patient, patient relates understanding diagnosis and is agreeable to Treatment Plan  Assessment    1   Bipolar I disorder, most recent episode mixed, severe without psychotic features   (296 63) (F31 63)    Signatures   Electronically signed by : Isi Olguin LCSW; Jun 20 2017 10:31AM EST                       (Author)

## 2018-01-12 NOTE — PSYCH
Treatment Plan Tracking      #2 Treatment Plan not completed within required time limits due to: Client presented with emotional/behavioral issues that required clinical intervention          Signatures   Electronically signed by : Emery Thompson LCSW; May 24 2016 11:45AM EST                       (Author)

## 2018-01-12 NOTE — PSYCH
Progress Note  Psychotherapy Provided [facility]: Individual Psychotherapy 45 minutes provided today  Goals addressed in session:   Goals: 2  D- Bess stated that she has been struggling with news that her old house will be going up for [employer]  She stated that it upsets her that the owners did not take care of it and regrets that she allowed her  talk her into moving  processing her emotions and discussing ways to cope with this  Also continuing to work on increasing self care  Giving supportive therapy  A- Progress - Continuing to process her emotions  P-Continue treatment       Pain Scale and Suicide Risk [facility]: On a scale of 0 to 10, the patient rates current pain at 4   Behavioral Health Treatment Plan [facility]: Diagnosis and Treatment Plan explained to patient, patient relates understanding diagnosis and is agreeable to Treatment Plan  Assessment    1   Bipolar I disorder, most recent episode mixed, severe without psychotic features   (296 63) (F31 63)    Signatures   Electronically signed by : Monster Becerra LCSW; Jun 13 2017  5:15PM EST                       (Author)

## 2018-01-12 NOTE — PSYCH
Progress Note  Psychotherapy Provided [facility]: Individual Psychotherapy 45 minutes provided today  Goals addressed in session:   Goals: 2  D- Bess stated that she has been struggling with vertigo  She stated that it stared suddenly over the holidays  Discussing ways to cope with the symptoms  Also discussing her concerns regarding her ferrets becoming ill  discussing ways to cope with her concerns Giving supportive therapy  A-Progress - Continuing to process her emotions  P- Continue treatment       Pain Scale and Suicide Risk [facility]: Current Pain Assessment: moderate to severe   On a scale of 0 to 10, the patient rates current pain at 5   Behavioral Health Treatment Plan [address] [place] [address] [address]: Diagnosis and Treatment Plan explained to patient, patient relates understanding diagnosis and is agreeable to Treatment Plan  Assessment    1   Bipolar I disorder, most recent episode mixed, severe without psychotic features   (296 63) (F31 63)    Signatures   Electronically signed by : Rik George LCSW; Pedro Pablo 10 2017 10:42AM EST                       (Author)

## 2018-01-12 NOTE — PSYCH
Progress Note  Psychotherapy Provided [facility]: Individual Psychotherapy 45 minutes provided today  Goals addressed in session:   Goals: 2   D- Bess stated that she has been frustrated with her  because he constantly complains  Most recently he has gotten sick and is convinced he has pneumonia  Discussing ways to cope with 's behaviors  PT focusing on sending time with her grandchildren  Continuing to work on use of healthy coping mechanisms and use of positive thought  Giving supportive therapy  A- Progress - Continuing to utilize healthy coping mechanisms  P- Continue treatment  Pain Scale and Suicide Risk [facility]: On a scale of 0 to 10, the patient rates current pain at 2   Current suicide risk is low   Behavioral Health Treatment Plan [address] [place] [address] [address]: Diagnosis and Treatment Plan explained to patient, patient relates understanding diagnosis and is agreeable to Treatment Plan  Assessment    1   Bipolar I disorder, most recent episode mixed, severe without psychotic features   (296 63) (F31 63)    Signatures   Electronically signed by : Rhiannon Xie LCSW; Jan 12 2016 12:00PM EST                       (Author)

## 2018-01-12 NOTE — PSYCH
Progress Note  Psychotherapy Provided [facility]: Individual Psychotherapy 45 minutes provided today  Goals addressed in session:   Goals: 2  CLEMENT Kellogg stated that she continues to experience stress related to her ferrets  She shared that her one ferret continues to show signs of illness intermittently  Discussing ways for her to deal with the stress  Also continuing to discuss ways for her to set limits with her  when he upsets her  Giving supportive therapy  A- progress - Continuing to process her emotions  P- Continue treatment       Pain Scale and Suicide Risk [facility]: On a scale of 0 to 10, the patient rates current pain at 4   Behavioral Health Treatment Plan [facility]: Diagnosis and Treatment Plan explained to patient, patient relates understanding diagnosis and is agreeable to Treatment Plan  Assessment    1   Bipolar I disorder, most recent episode mixed, severe without psychotic features   (296 63) (F31 63)    Signatures   Electronically signed by : Bishnu Russell LCSW; Oct 19 2016 10:02AM EST                       (Author)

## 2018-01-12 NOTE — PSYCH
Treatment Plan Tracking    #1 Treatment Plan not completed within required time limits due to: Client presented with emotional/behavioral issues that required clinical intervention            Signatures   Electronically signed by : David Newman LCSW; Aug  1 2017  2:33PM EST                       (Author)

## 2018-01-12 NOTE — PSYCH
Progress Note  Psychotherapy Provided [facility]: Individual Psychotherapy 45 minutes provided today  Goals addressed in session:   Goals: 2  CLEMENT Kellogg presented as upset and tearful  She shared that her ferret Spaz passed away last week  She stated that it was very upsetting because he became ill, lost all faculties and was screaming until he passed  She was upset because she couldn't get the local  to euthanize him  Processing her emotions and discussing ways for her to cope with her loss  Continuing to work on coping with all of the stress in her life  Giving supportive therapy  A- progress - COntinuing to process her emotions  P- Continue treatment       Pain Scale and Suicide Risk [facility]: On a scale of 0 to 10, the patient rates current pain at 3   Behavioral Health Treatment Plan [facility]: Diagnosis and Treatment Plan explained to patient, patient relates understanding diagnosis and is agreeable to Treatment Plan  Assessment    1   Bipolar I disorder, most recent episode mixed, severe without psychotic features   (296 63) (F31 63)    Signatures   Electronically signed by : Emery Thompson LCSW; Jan 18 2017 10:45AM EST                       (Author)

## 2018-01-13 NOTE — PSYCH
Progress Note  Psychotherapy Provided [facility]: Individual Psychotherapy 45 minutes provided today  Goals addressed in session:   Goals: 2  DChan Kellogg stated that she isn't watching her grandchildren today because her daughter i home from work  PT discussing her continued frustration with her  due to his lack of help with the grandchildren and housework  Discussing ways to assert herself with him  PT feels that it often isn't "worth her aggravation"  Discussing ways to practice self care  Also continuing to work on ways to build self confidence  Giving supportive therapy  A- Progress - COntinuing to work on building self confidence  P- Continue treatment       Pain Scale and Suicide Risk [facility]: On a scale of 0 to 10, the patient rates current pain at 3   Current suicide risk is low   Behavioral Health Treatment Plan [address] [place] [address] [address]: Diagnosis and Treatment Plan explained to patient, patient relates understanding diagnosis and is agreeable to Treatment Plan  Assessment    1   Bipolar I disorder, most recent episode mixed, severe without psychotic features   (296 63) (F31 63)    Signatures   Electronically signed by : Pee Covarrubias LCSW; Apr 26 2016 10:57AM EST                       (Author)

## 2018-01-13 NOTE — PSYCH
Progress Note  Psychotherapy Provided [facility]: Individual Psychotherapy 45 minutes provided today  Goals addressed in session:   Goals: 2 & 3  D- Bess stated that she continues to feel sad over the loss of her ferret  Continuing to keep herself busy with her grandchildren  PT stated that they had her granddaughter's birthday celebration over the weekend and that it went well  PT discussing the breakthrough pain that she is experiencing with her headaches, Discussing ways for her to cope with the pain  Giving supportive therapy  A- Progress - Continuing to process her emotions  P- Continue treatment       Pain Scale and Suicide Risk [facility]: On a scale of 0 to 10, the patient rates current pain at 4   Behavioral Health Treatment Plan [facility]: Diagnosis and Treatment Plan explained to patient, patient relates understanding diagnosis and is agreeable to Treatment Plan  Assessment    1   Bipolar I disorder, most recent episode mixed, severe without psychotic features   (296 63) (F31 63)    Signatures   Electronically signed by : Lebron Corbin LCSW; Jun 9 2016  8:42AM EST                       (Author)

## 2018-01-13 NOTE — PSYCH
Treatment Plan Tracking    #1 Treatment Plan not completed within required time limits due to: Client presented with emotional/behavioral issues that required clinical intervention            Signatures   Electronically signed by : Austen Gonzalez LCSW; Nov 8 2017  2:41PM EST                       (Author)

## 2018-01-13 NOTE — PSYCH
Treatment Plan Tracking    #1 Treatment Plan not completed within required time limits due to: Client presented with emotional/behavioral issues that required clinical intervention            Signatures   Electronically signed by : Kanika Cerda LCSW; Jul 12 2017  5:27PM EST                       (Author)

## 2018-01-13 NOTE — PSYCH
Treatment Plan Tracking        #3 Treatment Plan not completed within required time limits due to: Client presented with emotional/behavioral issues that required clinical intervention        Signatures   Electronically signed by : Prince Jaron LCSW; Jan 18 2017 10:45AM EST                       (Author)

## 2018-01-13 NOTE — PSYCH
Treatment Plan Tracking        #3 Treatment Plan not completed within required time limits due to: Client presented with emotional/behavioral issues that required clinical intervention        Signatures   Electronically signed by : Debi Garber LCSW; Oct 25 2016 11:41AM EST                       (Author)

## 2018-01-13 NOTE — PSYCH
Progress Note  Psychotherapy Provided [facility]: Individual Psychotherapy 45 minutes provided today  Goals addressed in session:   Goals: 2  D- PT stated that she continues to struggle in her relationship with her   She shared that she continually has to care for both grandchildren on a daily basis  She stated that he refuses to help and plays video games all day or sleeps  Discussing ways for her to assert herself as well as set boundaries  Processing her emotions and reviewing coping skills  PT stated that she does enjoy he time that she spends with her grandchildren  She also stated that she likes to read as well  Giving supportive therapy  A- Progress - Continuing to process her emotion s and utilize healthy coping mechanisms  P- Continue treatment       Pain Scale and Suicide Risk [facility]: Current Pain Assessment: moderate to severe   On a scale of 0 to 10, the patient rates current pain at 4   Current suicide risk is low   Behavioral Health Treatment Plan [facility]: Diagnosis and Treatment Plan explained to patient, patient relates understanding diagnosis and is agreeable to Treatment Plan  Assessment    1   Bipolar I disorder, most recent episode mixed, severe without psychotic features   (296 63) (F31 63)    Signatures   Electronically signed by : Anahy Gloria LCSW; Apr 5 2016 10:04AM EST                       (Author)

## 2018-01-13 NOTE — PSYCH
Treatment Plan Tracking      #2 Treatment Plan not completed within required time limits due to: Client presented with emotional/behavioral issues that required clinical intervention          Signatures   Electronically signed by : Von Beck LCSW; May 24 2016 11:53AM EST                       (Author)

## 2018-01-13 NOTE — PSYCH
Treatment Plan Tracking        #3 Treatment Plan not completed within required time limits due to: Client presented with emotional/behavioral issues that required clinical intervention        Signatures   Electronically signed by : Sebas Brooke LCSW; Jan 11 2017  1:16PM EST                       (Author)

## 2018-01-13 NOTE — PSYCH
Progress Note  Psychotherapy Provided [facility]: Individual Psychotherapy 45 minutes provided today  Goals addressed in session:   Goals: 1 & 3  D- Bess stated that she continues to struggle with headache pain  She also stated that she often does not fee positive about herself  Discussing ways to increase self esteem as well as coping with the pain  Giving supportive therapy  A- Progress - Continuing to process her emotions  P- Continue treatment       Pain Scale and Suicide Risk [facility]: Current Pain Assessment: moderate to severe   On a scale of 0 to 10, the patient rates current pain at 5   Behavioral Health Treatment Plan [facility]: Diagnosis and Treatment Plan explained to patient, patient relates understanding diagnosis and is agreeable to Treatment Plan  Assessment    1   Bipolar I disorder, most recent episode mixed, severe without psychotic features   (296 63) (F31 63)    Signatures   Electronically signed by : Stefan Altman LCSW; Feb 21 2017  2:41PM EST                       (Author)

## 2018-01-13 NOTE — PSYCH
Progress Note  Psychotherapy Provided [facility]: Individual Psychotherapy 45 minutes provided today  Goals addressed in session:   Goals: 2  D- Bess stated that she and her  continue to work on refinishing their kitchen which has been a stressful process  Discussing the difficulties of the project and ways for them to reduce the stress  Discussing increasing use of their support system to complete the process  ALso continuing to discuss managing the stress of babysitting their grandchildren  GIving supportive therapy  A- Continuing to work on stress reduction  P-Continue treatment       Pain Scale and Suicide Risk [facility]: Current Pain Assessment: moderate to severe   On a scale of 0 to 10, the patient rates current pain at 5   Behavioral Health Treatment Plan [employer]: Diagnosis and Treatment Plan explained to patient, patient relates understanding diagnosis and is agreeable to Treatment Plan  Assessment    1   Bipolar I disorder, most recent episode mixed, severe without psychotic features   (296 63) (F31 63)    Signatures   Electronically signed by : Austen Gonzalez LCSW; Aug 30 2017 10:10AM EST                       (Author)

## 2018-01-13 NOTE — PSYCH
Progress Note  Psychotherapy Provided [facility]: Individual Psychotherapy 45 minutes provided today  Goals addressed in session:   Goals: 2 & 3  CLEMENT Kellogg stated that she continues to experience and increase in pain with her headaches  Discussing the level of stress in her life contributing to the headaches  Also discussing use of healthy coping mechanisms to reduce her level of stress  Lelo Palma stated that she is also still extremely sad over the loss of her ferret  Processing her emotions  Giving supportive therapy  A- progress - Continuing to work on use of coping mechanisms for stress  P- Continue treatment       Pain Scale and Suicide Risk [facility]: Current Pain Assessment: moderate to severe   On a scale of 0 to 10, the patient rates current pain at 5   Behavioral Health Treatment Plan [facility]: Diagnosis and Treatment Plan explained to patient, patient relates understanding diagnosis and is agreeable to Treatment Plan  Assessment    1   Bipolar I disorder, most recent episode mixed, severe without psychotic features   (296 63) (F31 63)    Signatures   Electronically signed by : Cristal Gomez LCSW; Aug 30 2016 10:52AM EST                       (Author)

## 2018-01-13 NOTE — PSYCH
Progress Note  Psychotherapy Provided [facility]: Individual Psychotherapy 45 minutes provided today  Goals addressed in session:   Goals: 2 & 3  D- Bess stated that she is upset due to a bill that she received regarding her treatment here  Reviewed the bills determining that an error had been made  Discussing who to contact to rectify the issue  Bess also discussing the increasing level of pain with her headaches  Continuing to discuss ways to reduce stress in addition to increasing self care in order to decrease and cope with the pain  Giving supportive therapy  A- Progress - Continuing to wrk on increasing self care  P- Continue treatment       Pain Scale and Suicide Risk [facility]: Current Pain Assessment: moderate to severe   On a scale of 0 to 10, the patient rates current pain at 6   Behavioral Health Treatment Plan [address] [place] [address] [address]: Diagnosis and Treatment Plan explained to patient, patient relates understanding diagnosis and is agreeable to Treatment Plan  Assessment    1   Bipolar I disorder, most recent episode mixed, severe without psychotic features   (296 63) (F31 63)    Signatures   Electronically signed by : Ronni Pace LCSW; Aug 16 2016 11:30AM EST                       (Author)

## 2018-01-13 NOTE — PSYCH
Progress Note  Psychotherapy Provided [facility]: Individual Psychotherapy 45 minutes provided today  Goals addressed in session:   Goals: 2 & 3  D- Bess state that she continues to struggle with her headache pain  In addition, she has been struggling due to it being the year anniversary of the death of one of her ferrets  Processing her emotions and discussing ways for her to deal with the difficult situations in her life  Continuing to discuss the importance of self care  Giving supportive therapy  A- Progress - Continuing to process her emotions  P-Continue treatment       Pain Scale and Suicide Risk [facility]: Current Pain Assessment: moderate to severe   On a scale of 0 to 10, the patient rates current pain at 5   Behavioral Health Treatment Plan [facility]: Diagnosis and Treatment Plan explained to patient, patient relates understanding diagnosis and is agreeable to Treatment Plan  Assessment    1   Bipolar I disorder, most recent episode mixed, severe without psychotic features   (296 63) (F31 63)    Signatures   Electronically signed by : Kenji Schneider LCSW; May 23 2017 12:46PM EST                       (Author)

## 2018-01-13 NOTE — PSYCH
Treatment Plan Tracking      #2 Treatment Plan not completed within required time limits due to: Client presented with emotional/behavioral issues that required clinical intervention          Signatures   Electronically signed by : Drew Knowles LCSW; May 24 2016 11:53AM EST                       (Author)

## 2018-01-13 NOTE — PSYCH
Treatment Plan Tracking      #2 Treatment Plan not completed within required time limits due to: Client presented with emotional/behavioral issues that required clinical intervention          Signatures   Electronically signed by : Martha Mason LCSW; May 24 2016 11:52AM EST                       (Author)

## 2018-01-13 NOTE — PSYCH
Progress Note  Psychotherapy Provided [facility]: Individual Psychotherapy 45 minutes provided today  Goals addressed in session:   Goals: 2  D- Bess stated that she has continued to experience breakthrough headache pain which brings her down, but she stated that she continues to try to work though it  Continuing to work on use of coping mechanisms for the stress in her life as well as increasing self care  Giving supportive therapy  A- PRogress - Continuing to process her emotions  P-Continue treatment       Pain Scale and Suicide Risk [facility]: Current Pain Assessment: moderate to severe   On a scale of 0 to 10, the patient rates current pain at 5   Behavioral Health Treatment Plan [facility]: Diagnosis and Treatment Plan explained to patient, patient relates understanding diagnosis and is agreeable to Treatment Plan  Assessment    1   Bipolar I disorder, most recent episode mixed, severe without psychotic features   (296 63) (F31 63)    Signatures   Electronically signed by : Anahy Gloria LCSW; Ayan 15 2017  8:35AM EST                       (Author)

## 2018-01-14 VITALS
BODY MASS INDEX: 35.99 KG/M2 | HEART RATE: 80 BPM | TEMPERATURE: 99.1 F | WEIGHT: 216 LBS | SYSTOLIC BLOOD PRESSURE: 168 MMHG | HEIGHT: 65 IN | DIASTOLIC BLOOD PRESSURE: 96 MMHG

## 2018-01-14 VITALS
HEIGHT: 65 IN | TEMPERATURE: 98.2 F | WEIGHT: 215 LBS | RESPIRATION RATE: 17 BRPM | BODY MASS INDEX: 35.82 KG/M2 | SYSTOLIC BLOOD PRESSURE: 124 MMHG | HEART RATE: 67 BPM | DIASTOLIC BLOOD PRESSURE: 78 MMHG

## 2018-01-14 VITALS
BODY MASS INDEX: 35.9 KG/M2 | SYSTOLIC BLOOD PRESSURE: 136 MMHG | HEART RATE: 72 BPM | HEIGHT: 65 IN | WEIGHT: 215.5 LBS | DIASTOLIC BLOOD PRESSURE: 84 MMHG

## 2018-01-14 NOTE — PSYCH
Progress Note  Psychotherapy Provided [facility]: Individual Psychotherapy 45 minutes provided today  Goals addressed in session:   Goals: 2 & 3  Bess stated that she continues to deal with her headaches as well as other medical issues related to her diabetes causing issues with her feet  Discussing ways to cope with the medical issues and pain  Also discussing ways for her to practice self care  Giving supportive therapy  A- Progress - Continuing to work on increasing self care  P-Continue treatment       Pain Scale and Suicide Risk [facility]: Current Pain Assessment: moderate to severe   On a scale of 0 to 10, the patient rates current pain at 5   Behavioral Health Treatment Plan [facility]: Diagnosis and Treatment Plan explained to patient, patient relates understanding diagnosis and is agreeable to Treatment Plan  Assessment    1   Bipolar I disorder, most recent episode mixed, severe without psychotic features   (296 63) (F31 63)    Signatures   Electronically signed by : Cristal Gomez LCSW; Jul 12 2017  5:26PM EST                       (Author)

## 2018-01-14 NOTE — PSYCH
Progress Note  Psychotherapy Provided [facility]: Individual Psychotherapy 45 minutes provided today  Goals addressed in session:   Goals: 2  D- Bess stated that things have been somewhat difficult due to having bronchitis  She stated that the grandchildren, who she babysits, have also been sick  Discussing ways to deal with the stress of her situation as well as ways to practice self care  Giving supportive therapy  A- Progress - COntinuing to increase practice of self care  P- Continue treatment       Pain Scale and Suicide Risk [facility]: On a scale of 0 to 10, the patient rates current pain at 3   Current suicide risk is low   Behavioral Health Treatment Plan [facility]: Diagnosis and Treatment Plan explained to patient, patient relates understanding diagnosis and is agreeable to Treatment Plan  Assessment    1   Bipolar I disorder, most recent episode mixed, severe without psychotic features   (296 63) (F31 63)    Signatures   Electronically signed by : Jose Lundberg LCSW; Mar 29 2016 12:54PM EST                       (Author)

## 2018-01-14 NOTE — PSYCH
Progress Note  Psychotherapy Provided [facility]: Individual Psychotherapy 45 minutes provided today  Goals addressed in session:   Goals: 3  D- Bess stated that she continues to experience break through pain  She shared that it is often difficult for her to function with the pain She stated that there is no medical recourse at this time  Discussing ways for her to cope with the pain level  She shared that she is relieved that none of the other ferrets seem ill at this time  She also continues to focus on the care of her grandchildren  Giving supportive therapy  A- Progress - Continuing to utilize positive coping mechanisms to cope with the pain  P- Continue treatment       Pain Scale and Suicide Risk [facility]: Current Pain Assessment: moderate to severe   On a scale of 0 to 10, the patient rates current pain at 7   Behavioral Health Treatment Plan [facility]: Diagnosis and Treatment Plan explained to patient, patient relates understanding diagnosis and is agreeable to Treatment Plan  Assessment    1   Bipolar I disorder, most recent episode mixed, severe without psychotic features   (296 63) (F31 63)    Signatures   Electronically signed by : Matteo Esposito LCSW; Oct 11 2016 10:33AM EST                       (Author)

## 2018-01-14 NOTE — PSYCH
Progress Note  Psychotherapy Provided [facility]: Individual Psychotherapy 45 minutes provided today  Goals addressed in session:   Goals: 1 & 3  D- Bess stated that she has been struggling recently with health issues and break through pain with her headaches  She shared that she feels the coughing from the bronchitis may be a factor in the break through pain  discussing ongoing issues with her  in in terms of his negativity, complaint and overall lack of helping at home  Discussing ways for her to cope with her current situation and increase her support system  PT identifying he daughter as her main support and her grandchildren as her main coping mechanisms  Continuing to process her emotions and increase use of positive thought  Giving supportive therapy  A- Progress - Continuing to increase use of positive coping skills  P- Continue treatment       Pain Scale and Suicide Risk [facility]: Current Pain Assessment: moderate to severe   On a scale of 0 to 10, the patient rates current pain at 3   Current suicide risk is low   Behavioral Health Treatment Plan [address] [place] [address] [address]: Diagnosis and Treatment Plan explained to patient, patient relates understanding diagnosis and is agreeable to Treatment Plan  Assessment    1   Bipolar I disorder, most recent episode mixed, severe without psychotic features   (296 63) (F31 63)    Signatures   Electronically signed by : Allie Woods LCSW; Feb 5 2016 11:41AM EST                       (Author)

## 2018-01-14 NOTE — PSYCH
Treatment Plan Tracking    #1 Treatment Plan not completed within required time limits due to: Client presented with emotional/behavioral issues that required clinical intervention            Signatures   Electronically signed by : Ronni Pace LCSW; Jun 20 2017 10:32AM EST                       (Author)

## 2018-01-14 NOTE — PSYCH
Treatment Plan Tracking        #3 Treatment Plan not completed within required time limits due to: Client presented with emotional/behavioral issues that required clinical intervention        Signatures   Electronically signed by : Destini Hays LCSW; Oct 19 2016 10:03AM EST                       (Author)

## 2018-01-14 NOTE — PSYCH
Date of Initial Treatment Plan: 6/11/09  Date of Current Treatment Plan: 2/14/17  Strengths/Personal Resources for Self Care: Good mom and grandmother, good at crafts, photography, good with pets  Diagnosis:   Axis I: Bipolar I   Axis II: deferred   Axis III: HTN, diabetes, headaches     Area of Needs: Self esteem  depression/anxiety  headaches  Long Term Goals:   Be more sure of myself   Target Date: 6/14/17      Feel better   Target Date: 6/14/17      Be able to manage my pain and function   Target Date: 6/14/17    Short Term Objectives:   Goal 1:   See what I do well  be able to takes compliments/positives  Think positive  Target Date: 6/14/17      Goal 2:   Talk about it  Be able to cope with the lo of the ferrets  Not be afraid to talk about what's bothering me  Enjoy my time with the grandchildren  Target Date: 6/14/17      Goal 3:   Relax when I need to  Try to reduce stress  Follow up with doctors  Target Date: 6/14/17      GOAL 1: Modality: Individual 4 x per month Target Date: 6/14/17       The person(s) responsible for carrying out the plan is Bess  GOAL 2: Modality: Individual 4 x per month Target Date: 6/14/17       The person(s) responsible for carrying out the plan is Bess  GOAL 3: Modality: Individual 4 x per month Target Date: 6/14/17         The person(s) responsible for carrying out the plan is Bess  The first scheduled review date is 6/14/17                 Patient Signature: _________________________________ Date/Time: ______________       Electronically signed by : Jose Lundberg LCSW; Feb 14 2017  9:37AM EST                       (Author)

## 2018-01-14 NOTE — PSYCH
Treatment Plan Tracking    #1 Treatment Plan not completed within required time limits due to: Client presented with emotional/behavioral issues that required clinical intervention            Signatures   Electronically signed by : Anahy Gloria LCSW; Aug 15 2017  2:14PM EST                       (Author)

## 2018-01-15 NOTE — PSYCH
Progress Note  Psychotherapy Provided [facility]: Individual Psychotherapy 45 minutes provided today  Goals addressed in session:   Goals: 2  CLEMENT Kellogg stated that she has been frustrated with her  who hasn't been helping her watch the grandchildren or helping with household tasks  She also shared that he continues to spend excessively  Processing her emotions and discussing ways for her to communicate her feelings to him as well as ways to set boundaries  Giving supportive therapy  A- PRogress - Continuing to process her emotions  P- Continue treatment       Pain Scale and Suicide Risk [facility]: Current Pain Assessment: moderate to severe   On a scale of 0 to 10, the patient rates current pain at 5   Behavioral Health Treatment Plan [facility]: Diagnosis and Treatment Plan explained to patient, patient relates understanding diagnosis and is agreeable to Treatment Plan  Assessment    1   Bipolar I disorder, most recent episode mixed, severe without psychotic features   (296 63) (F31 63)    Signatures   Electronically signed by : Von Beck LCSW; Aug  1 2017  2:32PM EST                       (Author)

## 2018-01-15 NOTE — PSYCH
Progress Note  Psychotherapy Provided [facility]: Individual Psychotherapy 45 minutes provided today  Goals addressed in session:   Goals: 1, 2 & 3  d- Elvie Barrera stated that she continues to struggle with her ferrets death  Discussing ways to cope with her loss  Also continuing to work on dealing with break-through pain regarding her headaches  COntinuing to work on use of healthy coping mechanisms for stress  Reviewing and revising treatment goals  Giving supportive therapy  A- Progress - Continuing to process her emotions  P- Continue treatment       Pain Scale and Suicide Risk [facility]: On a scale of 0 to 10, the patient rates current pain at 4   Behavioral Health Treatment Plan [facility]: Diagnosis and Treatment Plan explained to patient, patient relates understanding diagnosis and is agreeable to Treatment Plan  Assessment    1   Bipolar I disorder, most recent episode mixed, severe without psychotic features   (296 63) (F31 63)    Signatures   Electronically signed by : Tyree Zhang LCSW; May 24 2016 11:34AM EST                       (Author)

## 2018-01-15 NOTE — PSYCH
Progress Note  Psychotherapy Provided [facility]: Individual Psychotherapy 45 minutes provided today  Goals addressed in session:   Goals: 2  D- Bess stated that she is very upset because one of the other ferrets is now showing signs of illness  In addition, they dismantled part of the cage due to the decreasing number of ferrets  Processing her emotions and discussing ways for her to cope with her grief  She stated that she continues to focus on the care of her grandchildren  Cathleen Enid also focusing in her financial concerns and her daughter's lack of willingness to reimburse her for childcare  Discussing ways for her to communicate her concerns to her daughter  Giving supportive therapy  A- Progress - COntinuing to process her emotions  P- Continue treatment       Pain Scale and Suicide Risk [facility]: Current Pain Assessment: moderate to severe   On a scale of 0 to 10, the patient rates current pain at 6   Behavioral Health Treatment Plan [facility]: Diagnosis and Treatment Plan explained to patient, patient relates understanding diagnosis and is agreeable to Treatment Plan  Assessment    1   Bipolar I disorder, most recent episode mixed, severe without psychotic features   (296 63) (F31 63)    Signatures   Electronically signed by : Debi Garber LCSW; Sep 20 2016 11:27AM EST                       (Author)

## 2018-01-15 NOTE — PSYCH
Treatment Plan Tracking    #1 Treatment Plan not completed within required time limits due to: Client presented with emotional/behavioral issues that required clinical intervention            Signatures   Electronically signed by : Destini Hays LCSW; Nov 15 2017  3:59PM EST                       (Author)

## 2018-01-15 NOTE — PSYCH
Progress Note  Psychotherapy Provided [facility]: Individual Psychotherapy 45 minutes provided today  Goals addressed in session:   Goals: 2  D- Bess stated that things have been somewhat rough due to both her grandchildren teething  She stated that they have both been very "clingy" and that it has been difficult for her to attend to both of their needs  She stated that her  is not helpful and that he often states that he can't "handle the noise" and goes back to bed  Discussing ways for her to cope with her stress and the sense of being overwhelmed  Processing her emotions regarding her husbands lack of participation with the grandchildren  Giving supportive therapy  A- Progress - COntinuing to work on productive ways to deal with stress leading to depression  P- Continue treatment       Pain Scale and Suicide Risk [facility]: On a scale of 0 to 10, the patient rates current pain at 3   Behavioral Health Treatment Plan [facility]: Diagnosis and Treatment Plan explained to patient, patient relates understanding diagnosis and is agreeable to Treatment Plan  Assessment    1   Bipolar I disorder, most recent episode mixed, severe without psychotic features   (296 63) (F31 63)    Signatures   Electronically signed by : Mayank Sargent LCSW; Jun 14 2016 10:48AM EST                       (Author)

## 2018-01-15 NOTE — PSYCH
Treatment Plan Tracking    #1 Treatment Plan not completed within required time limits due to: Client presented with emotional/behavioral issues that required clinical intervention            Signatures   Electronically signed by : Celina Quiroga LCSW; Jun 27 2017  9:56AM EST                       (Author)

## 2018-01-15 NOTE — PSYCH
Progress Note  Psychotherapy Provided [facility]: Individual Psychotherapy 45 minutes provided today  Goals addressed in session:   Goals: 2  DChan Kellogg stated that she continues to be frustrated with her  at times  She stated that he often doesn't help with the grandchildren and goes back to bed during the day  Discussing ways for her to communicate her feelings to him  ALso continuing to discuss self care  She stated that her daughter and her  are on vacation next week and that she will have a break from watching her grandchildren  Discussing what she will do with her time to enjoy her break  Giving supportive therapy  A- Progress - Continuing to process her emotions  P-Continue treatment       Pain Scale and Suicide Risk [facility]: Current Pain Assessment: moderate to severe   On a scale of 0 to 10, the patient rates current pain at 5   Behavioral Health Treatment Plan [facility]: Diagnosis and Treatment Plan explained to patient, patient relates understanding diagnosis and is agreeable to Treatment Plan  Assessment    1   Bipolar I disorder, most recent episode mixed, severe without psychotic features   (296 63) (F31 63)    Signatures   Electronically signed by : Mercedez Davison LCSW; Jun 27 2017  9:55AM EST                       (Author)

## 2018-01-15 NOTE — PSYCH
Progress Note  Psychotherapy Provided [facility]: Individual Psychotherapy 45 minutes provided today  Goals addressed in session:   Goals: 2  CLEMENT Kellogg presented as upset and tearful  She shared that one of her ferrets passed away  Processing her emotions and discussing ways to cope with her grief  Rose Santiago also discussing an upcoming family wedding and her ongoing difficulty with anxiety in social situations  Discussing ways for her to cope with her anxiety and utilizing a plan for moments of high anxiety at the event  Giving supportive therapy  A- PRogress - Continuing to process her emotions  P-Continue treatment       Pain Scale and Suicide Risk [facility]: Current Pain Assessment: moderate to severe   On a scale of 0 to 10, the patient rates current pain at 5   Behavioral Health Treatment Plan [facility]: Diagnosis and Treatment Plan explained to patient, patient relates understanding diagnosis and is agreeable to Treatment Plan  Assessment    1   Bipolar I disorder, most recent episode mixed, severe without psychotic features   (296 63) (F31 63)    Signatures   Electronically signed by : Martha Mason LCSW; Nov 8 2017  2:41PM EST                       (Author)

## 2018-01-15 NOTE — PSYCH
Treatment Plan Tracking        #3 Treatment Plan not completed within required time limits due to: Client presented with emotional/behavioral issues that required clinical intervention        Signatures   Electronically signed by : Marjorie Walsh LCSW; Nov 22 2016  1:16PM EST                       (Author)

## 2018-01-15 NOTE — PSYCH
Treatment Plan Tracking    #1 Treatment Plan not completed within required time limits due to: Client presented with emotional/behavioral issues that required clinical intervention            Signatures   Electronically signed by : Martha Garcia LCSW; Jul 25 2017  2:11PM EST                       (Author)

## 2018-01-15 NOTE — PSYCH
Treatment Plan Tracking        #3 Treatment Plan not completed within required time limits due to: Client presented with emotional/behavioral issues that required clinical intervention        Signatures   Electronically signed by : Marjorie Walsh LCSW; Dec 13 2016  1:04PM EST                       (Author)

## 2018-01-15 NOTE — PSYCH
Treatment Plan Tracking    #1 Treatment Plan not completed within required time limits due to: Client presented with emotional/behavioral issues that required clinical intervention            Signatures   Electronically signed by : Stefan Altman LCSW; Sep 12 2017 11:29AM EST                       (Author)

## 2018-01-16 NOTE — PSYCH
Progress Note  Psychotherapy Provided [facility]: Individual Psychotherapy 45 minutes provided today  Goals addressed in session:   Goals: 2  D- Alex Burger stated that she is frustrated because the kitchen remains unfinished  She shared that the workers were supposed to come out and assess for her new countertops  Discussing her frustration and ways to cope with having a half finished kitchen  ALso discussing her everyday stress in baby-sitting her grandchildren and coping with her headaches  Discussing use of healthy coping mechanisms  Giving supportive therapy  A- Progress - Continuing to process her emotions  P-Continue treatment       Pain Scale and Suicide Risk [facility]: Current Pain Assessment: moderate to severe   On a scale of 0 to 10, the patient rates current pain at 5   Behavioral Health Treatment Plan [address] [place] [address] [address]: Diagnosis and Treatment Plan explained to patient, patient relates understanding diagnosis and is agreeable to Treatment Plan  Assessment    1   Bipolar I disorder, most recent episode mixed, severe without psychotic features   (296 63) (F31 63)    Signatures   Electronically signed by : Mk Longoria LCSW; Sep 19 2017 11:40AM EST                       (Author)

## 2018-01-16 NOTE — PSYCH
Progress Note  Psychotherapy Provided [facility]: Individual Psychotherapy 45 minutes provided today  Goals addressed in session:   Goal: 2  DChan Kellogg presented as tearful  She stated that they had to put one if their ferrets down because he lost the use of his back legs and his ability to urinate on his own  Processing her emotions and discussing ways to be able to cope with her loss  Discussing utilization of her support system during this difficult time  PT discussing her fear of losing more of the ferrets  Discussing focusing on her grandchildren as a method of coping  Giving supportive therapy  A- Progress - Processing her emotions regarding the loss of her ferret  P- Continue treatment       Pain Scale and Suicide Risk [facility]: Current Pain Assessment: moderate to severe   On a scale of 0 to 10, the patient rates current pain at 6   Behavioral Health Treatment Plan [facility]: Diagnosis and Treatment Plan explained to patient, patient relates understanding diagnosis and is agreeable to Treatment Plan  Assessment    1   Bipolar I disorder, most recent episode mixed, severe without psychotic features   (296 63) (F31 63)    Signatures   Electronically signed by : Marjorie Walsh LCSW; May 17 2016  9:51AM EST                       (Author)

## 2018-01-16 NOTE — PSYCH
Progress Note  Psychotherapy Provided [facility]: Individual Psychotherapy 45 minutes provided today  Goals addressed in session:   Goals: 1  D- Bess stated that she feels that things are going OK  She stated that she struggles with self esteem and advocating for herself  She stated that her mother was supposed to visit her grandchildren and then shahbaz followed through  Discussing how she has always felt the most disregarded in the family  Discussing ways for her to advocate for her self  Continuing to focus on increasing positive self thought  A- progress _ COntinuing to process her emotions  P- Continue treatment       Pain Scale and Suicide Risk [facility]: Current Pain Assessment: moderate to severe   On a scale of 0 to 10, the patient rates current pain at 4   Behavioral Health Treatment Plan [facility]: Diagnosis and Treatment Plan explained to patient, patient relates understanding diagnosis and is agreeable to Treatment Plan  Assessment    1   Bipolar I disorder, most recent episode mixed, severe without psychotic features   (296 63) (F31 63)    Signatures   Electronically signed by : David Newman LCSW; Aug  4 2016  3:53PM EST                       (Author)

## 2018-01-16 NOTE — PSYCH
Progress Note  Psychotherapy Provided [facility]: Individual Psychotherapy 45 minutes provided today  Goals addressed in session:   Goals: 2  D- Bess stated that she continues to experience stress with her   Continuing to work on ways for her to communicate her feelings to him and assert herself in the relationship  Continuing to work on use of healthy coping mechanisms to deal with the stress in her life  Also discussing ways to navigate the stress of the holidays  Giving supportive therapy  A- progress - Continuing to process her emotions  P- Continue treatment       Pain Scale and Suicide Risk [facility]: Current Pain Assessment: moderate to severe   On a scale of 0 to 10, the patient rates current pain at 4   Behavioral Health Treatment Plan [facility]: Diagnosis and Treatment Plan explained to patient, patient relates understanding diagnosis and is agreeable to Treatment Plan  Assessment    1   Bipolar I disorder, most recent episode mixed, severe without psychotic features   (296 63) (F31 63)    Signatures   Electronically signed by : Allie Woods LCSW; Dec 13 2016  1:03PM EST                       (Author)

## 2018-01-16 NOTE — PSYCH
Progress Note  Psychotherapy Provided [facility]: Individual Psychotherapy 45 minutes provided today  Goals addressed in session:   Goals: 2 & 3  D- Bess stated that she has been having a lot of break through headache pain and is also extremely congested and not feeling well  Discussing the other stressors in her life including recently helping her uncle to move and caring for her grandchildren  Continuing to discuss the importance of self care  Giving supportive therapy  A- Progress - Continuing to work on increasing self care  P-Continue treatment       Pain Scale and Suicide Risk [facility]: Current Pain Assessment: moderate to severe   On a scale of 0 to 10, the patient rates current pain at 6   Behavioral Health Treatment Plan [facility]: Diagnosis and Treatment Plan explained to patient, patient relates understanding diagnosis and is agreeable to Treatment Plan  Assessment    1   Bipolar I disorder, most recent episode mixed, severe without psychotic features   (296 63) (F31 63)    Signatures   Electronically signed by : Debi Garber LCSW; May 30 2017 12:47PM EST                       (Author)

## 2018-01-16 NOTE — PSYCH
Progress Note  Psychotherapy Provided [facility]: Individual Psychotherapy 45 minutes provided today  Goals addressed in session:   Goals: 2 & 3  D- Bess stated that she has been experiencing more break through pain with her headaches  Discussing the ongoing stressors in her life that could be contributing to this  Also discussing ways to cope with the current stressor in her life  Continuing to work on increasing assertiveness in her relationship with her   Giving supportive therapy  A- progress - Continuing to process her emotions  P- Continue treatment       Pain Scale and Suicide Risk [facility]: Current Pain Assessment: moderate to severe   On a scale of 0 to 10, the patient rates current pain at 7   Behavioral Health Treatment Plan [address] [place] [address] [address]: Diagnosis and Treatment Plan explained to patient, patient relates understanding diagnosis and is agreeable to Treatment Plan  Assessment    1   Bipolar I disorder, most recent episode mixed, severe without psychotic features   (296 63) (F31 63)    Signatures   Electronically signed by : Sebas Brooke LCSW; Mar 21 2017 11:06AM EST                       (Author)

## 2018-01-16 NOTE — PSYCH
Progress Note  Psychotherapy Provided [facility]: Individual Psychotherapy 45 minutes provided today  Goals addressed in session:   Goals: 2  DChan Kellogg stated that she is struggling today because she is tired and experiencing break through pain from her headaches  Discussing ways to resolve issues with pain and sleep  PT stating that she continues to enjoy her time with her grandchildren  She feels that the time spent with them has contributed greatly to her ability to remain stable  Continuing to discuss ways to practice self care  Giving supportive therapy  A- Progress - Continuing to process her emotions  P- Continue treatment       Pain Scale and Suicide Risk [facility]: Current Pain Assessment: moderate to severe   On a scale of 0 to 10, the patient rates current pain at 5   Current suicide risk is low   Behavioral Health Treatment Plan [address] [place] [address] [address]: Diagnosis and Treatment Plan explained to patient, patient relates understanding diagnosis and is agreeable to Treatment Plan  Assessment    1   Bipolar I disorder, most recent episode mixed, severe without psychotic features   (296 63) (F31 63)    Signatures   Electronically signed by : Isi Olguin LCSW; May  3 2016 11:49AM EST                       (Author)

## 2018-01-16 NOTE — PSYCH
Treatment Plan Tracking    #1 Treatment Plan not completed within required time limits due to: Client presented with emotional/behavioral issues that required clinical intervention            Signatures   Electronically signed by : Martha Garcia LCSW; Nov 28 2017 10:33AM EST                       (Author)

## 2018-01-16 NOTE — PSYCH
Progress Note  Psychotherapy Provided [facility]: Individual Psychotherapy 45 minutes provided today  Goals addressed in session:   Goals: 3  D- Bess stated that she has been doing well  PT stated that she will be canceling her appointment for next week due to not having her grandchildren and wanting to get things accomplished at home while she has free time  Discussing her issues with ongoing headaches and how they affect her level of functioning  Discussing ways to cope with this and increase her level of self care  PT stated that in addition to getting things done next week, she is looking forward to reading  Giving supportive therapy  A- Progress - Continuing to work on increasing self care  P- Continue treatment       Pain Scale and Suicide Risk [facility]: Current Pain Assessment: moderate to severe   On a scale of 0 to 10, the patient rates current pain at 3   Behavioral Health Treatment Plan [facility]: Diagnosis and Treatment Plan explained to patient, patient relates understanding diagnosis and is agreeable to Treatment Plan  Assessment    1   Bipolar I disorder, most recent episode mixed, severe without psychotic features   (296 63) (F31 63)    Signatures   Electronically signed by : Oxana Lyons LCSW; Jun 28 2016  1:59PM EST                       (Author)

## 2018-01-16 NOTE — PSYCH
Progress Note  Psychotherapy Provided [facility]: Individual Psychotherapy 45 minutes provided today  Goals addressed in session:   Goals: 2 & 3  D- Bess stated that she has been struggling with break through headache pain  In addition, this makes it more difficult to watch her grandchildren and also do things that she enjoys like reading  Discussing ways for her to cope with the pain and also be able to practice self care  Giving supportive therapy  A- PRogress - Continuing to process her emotions  P-Continue treatment       Pain Scale and Suicide Risk [facility]: Current Pain Assessment: moderate to severe   On a scale of 0 to 10, the patient rates current pain at 6   Behavioral Health Treatment Plan [facility]: Diagnosis and Treatment Plan explained to patient, patient relates understanding diagnosis and is agreeable to Treatment Plan  Assessment    1   Bipolar I disorder, most recent episode mixed, severe without psychotic features   (296 63) (F31 63)    Signatures   Electronically signed by : Anita Robles LCSW; Nov 28 2017 10:32AM EST                       (Author)

## 2018-01-16 NOTE — PSYCH
Progress Note  Psychotherapy Provided [facility]: Individual Psychotherapy 45 minutes provided today  Goals addressed in session:   Goals: 3  D- Bess stated that she and her  enjoyed their week off from babysittin  They got much accomplishhed around the house and also went to dinner with family  She stated that despite enjoying her break she did miss he grandchildren  Sheel stating that is experiencing break though pain with her headaches  She shared that she continues to adjust her SCS to assist with the pain  Bess shared that she tried to practice self car by reading while her grandchildren were away  Giving supportive therpay  A- progress - Continuing to increase self care  P- Continue treatment       Pain Scale and Suicide Risk [facility]: Current Pain Assessment: moderate to severe   On a scale of 0 to 10, the patient rates current pain at 4   Behavioral Health Treatment Plan [facility]: Diagnosis and Treatment Plan explained to patient, patient relates understanding diagnosis and is agreeable to Treatment Plan  Assessment    1   Bipolar I disorder, most recent episode mixed, severe without psychotic features   (296 63) (F31 63)    Signatures   Electronically signed by : Destini Hays LCSW; Jul 12 2016  3:40PM EST                       (Author)

## 2018-01-16 NOTE — PSYCH
Progress Note  Psychotherapy Provided [facility]: Individual Psychotherapy 45 minutes provided today  Goals addressed in session:   Goals: 2  D- Bess stated that she continues to struggle with the loss of her ferret  She stated that she feels that she "killed" him due to nit getting a second opinion regarding his health  Discussing her excellent level of care for her pets and and her efforts in trying to save him  Processing her emotions and continuing to discuss ways for her to deal with her grief  Giving supportive therapy  A- Progress - Continuing to process her emotions  P- Continue treatment       Pain Scale and Suicide Risk [facility]: On a scale of 0 to 10, the patient rates current pain at 3   Behavioral Health Treatment Plan [employer]: Diagnosis and Treatment Plan explained to patient, patient relates understanding diagnosis and is agreeable to Treatment Plan  Assessment    1   Bipolar I disorder, most recent episode mixed, severe without psychotic features   (296 63) (F31 63)    Signatures   Electronically signed by : Sebas Brooke LCSW; May 31 2016 10:45AM EST                       (Author)

## 2018-01-16 NOTE — PROGRESS NOTES
Assessment    1  Asthmatic bronchitis with exacerbation (493 92) (J45 901)    Plan  Asthmatic bronchitis with exacerbation    · Azithromycin 250 MG Oral Tablet; take 2 tablets on day 1 then 1 tablet daily x 4  days   · PredniSONE 10 MG Oral Tablet; Take 6 tabs QD X1, then 5 tabs QD X1, then 4  tabs QD X 1 , then 3 tabs QDX 1, then 2 tabs QD X 1, then 1 tab QD X  1  Essential hypertension    · Atenolol-Chlorthalidone 50-25 MG Oral Tablet; 1/2 tab daily  Hyperlipidemia    · Simvastatin 20 MG Oral Tablet; Take 1 tablet daily    Discussion/Summary    Azithromycin as ordered  prednisone taper  f/u in 6 months or as needed for wellness visit  Chief Complaint    1  Cough  Coughing x 1 week      History of Present Illness  HPI: here for cough x 1 week  having coughing spells  intermittent congestion  no face or sinus pressure      Review of Systems    Constitutional: No fever, no chills, feels well, no tiredness, no recent weight gain or loss  ENT: no ear ache, no loss of hearing, no nosebleeds or nasal discharge, no sore throat or hoarseness  Cardiovascular: no complaints of slow or fast heart rate, no chest pain, no palpitations, no leg claudication or lower extremity edema  Respiratory: cough, but as noted in HPI  Breasts: no complaints of breast pain, breast lump or nipple discharge  Gastrointestinal: no complaints of abdominal pain, no constipation, no nausea or diarrhea, no vomiting, no bloody stools  Genitourinary: no complaints of dysuria, no incontinence, no pelvic pain, no dysmenorrhea, no vaginal discharge or abnormal vaginal bleeding  Musculoskeletal: no complaints of arthralgia, no myalgia, no joint swelling or stiffness, no limb pain or swelling  Integumentary: no complaints of skin rash or lesion, no itching or dry skin, no skin wounds  Neurological: no complaints of headache, no confusion, no numbness or tingling, no dizziness or fainting  Active Problems    1   Anxiety disorder (300 00) (F41 9)   2  Bipolar I disorder, most recent episode mixed, severe without psychotic features (296 63)   (F31 63)   3  Chronic constipation (564 00) (K59 00)   4  Chronic headaches (784 0) (R51)   5  Chronic migraine without aura (346 70) (G43 709)   6  Encounter for postoperative care (V58 49) (Z48 89)   7  Essential hypertension (401 9) (I10)   8  Headache (784 0) (R51)   9  Hyponatremia (276 1) (E87 1)   10  Insomnia (780 52) (G47 00)   11  Leukopenia (288 50) (D72 819)   12  Medication overuse headache (339 3) (G44 40)   13  Occipital neuralgia (723 8) (M54 81)   14  Pain syndrome, chronic (338 4) (G89 4)   15  Surgery, elective (V50 9) (Z41 9)    Past Medical History    1  History of Anxiety (300 00) (F41 9)   2  History of Colonoscopy (Fiberoptic) Screening   3  History of Diabetes Mellitus (250 00)   4  History of depression (V11 8) (Z86 59)   5  History of hyperlipidemia (V12 29) (Z86 39)   6  History of hypertension (V12 59) (Z86 79)   7  History of Other headache syndrome (339 89) (G44 89)  Active Problems And Past Medical History Reviewed: The active problems and past medical history were reviewed and updated today  Family History    1  Family history of Heart Disease (V17 49)   2  Family history of Prostate Cancer (V16 42)    3  Family history of Acute Myocardial Infarction (V17 3)   4  Family history of Maternal Grandmother Is     5  Family history of Breast Cancer (V16 3)   6  Family history of Paternal Grandmother Is     9  Family history of Colon Cancer (V16 0)   8  Family history of Maternal Grandfather Is     5  Family history of Acute Myocardial Infarction (V17 3)   10  Family history of Paternal Grandfather Is     6  Family history of Coronary Artery Disease (V17 49)   12  Family history of Diabetes Mellitus (V18 0)  Family History Reviewed: The family history was reviewed and updated today         Social History    · Denied: History of Alcohol Use (History)   · Caffeine use (V49 89) (F15 90)   · Current Every Day Smoker (305 1)   · Daily Coffee Consumption (1  Cups/Day)   · Denied: History of Drug Use   · Lives with spouse   · Marital History - Currently    · Denied: History of Uses  drugs   · Denied: History of Uses marijuana  The social history was reviewed and updated today  The social history was reviewed and is unchanged  Surgical History    1  History of Knee Surgery   2  History of Tonsillectomy  Surgical History Reviewed: The surgical history was reviewed and updated today  Current Meds   1  Atenolol-Chlorthalidone 50-25 MG Oral Tablet; 1/2 tab daily  Requested for: 21UDD7185; Last RQ:[id number] Ordered   2  BuPROPion HCl ER (XL) 300 MG Oral Tablet Extended Release 24 Hour; Take 1 tablet   daily; Therapy: 10Iyw2314 to (Evaluate:11Nov2014)  Requested for: 19PIX5533; Last   Rx:[id number] Ordered   3  Epitol 200 MG Oral Tablet; TAKE 3 TABLET Bedtime; Therapy: 87Kxc8552 to (Evaluate:14Oct2014)  Requested for: 33GIQ5637; Last   Rx:[id number] Ordered   4  Fish Oil 1000 MG Oral Capsule; Take 1 capsule twice daily; Therapy: (Recorded:08Oct2015) to Recorded   5  LORazepam 1 MG Oral Tablet; take 1- 1/2 every 4 hours prn; Therapy: 04UTZ2802 to (Evaluate:72Zbf8244); Last Rx:[id number] Ordered   6  RisperiDONE 1 MG Oral Tablet; TAKE 1 TABLET AT BEDTIME; Therapy: 84KSR5473 to (Evaluate:11Nov2014)  Requested for: 22Apr2015; Last   CP:06IOH6066; Status: ACTIVE - Renewal Denied Ordered   7  Sertraline HCl - 100 MG Oral Tablet; TAKE 1 TABLET DAILY AS DIRECTED; Therapy: 31UJZ9414 to (Evaluate:11Nov2014)  Requested for: 31GNV6863; Last   Rx:[id number] Ordered   8  Simvastatin 20 MG Oral Tablet; Take 1 tablet daily  Requested for: 97BYJ3567; Status:   ACTIVE - Renewal Denied Recorded   9  Ziprasidone HCl - 60 MG Oral Capsule; Take 2 Daily with dinner; Therapy: 65JNT6447 to (Evaluate:69Eas8120)  Requested for: 26TBX4140;  Last Rx: [id number] Ordered    The medication list was reviewed and updated today  Allergies    1  No Known Drug Allergies    Vitals   Recorded: 59VUF9903 02:59PM   Temperature 98 9 F, Tympanic   Heart Rate 66, R Radial   Pulse Quality Regular   Systolic 695, RUE, Sitting   Diastolic 90, RUE, Sitting   Height 5 ft 5 in   Weight 203 lb    BMI Calculated 33 78   BSA Calculated 1 99     Physical Exam    Constitutional   General appearance: No acute distress, well appearing and well nourished  Ears, Nose, Mouth, and Throat   Otoscopic examination: Tympanic membranes translucent with normal light reflex  Canals patent without erythema  Oropharynx: Normal with no erythema, edema, exudate or lesions  Pulmonary   Respiratory effort: No increased work of breathing or signs of respiratory distress  Auscultation of lungs: Clear to auscultation  Cardiovascular   Auscultation of heart: Normal rate and rhythm, normal S1 and S2, without murmurs           Future Appointments    Date/Time Provider Specialty Site   07/13/2016 08:45 AM Jai Grant DO Family Medicine [facility]     Signatures   Electronically signed by : Jarrod Garza DO; Jan 19 2016  3:38PM EST                       (Author)

## 2018-01-16 NOTE — PSYCH
Progress Note  Psychotherapy Provided [facility]: Individual Psychotherapy 45 minutes provided today  Goals addressed in session:   Goals: 2  D- Bess stated hat she feels somewhat overwhelmed at times due to her schedule babysitting her grandchildren  Discussing ways for her to practice self care  Bess stating that her son in law may be getting a new job which would make their schedule easier  Discussing issues with the grandchildren that are stressful such as potty training  Giving supportive therapy  A- progress - Continuing to process her emotions  P- continue treatment       Pain Scale and Suicide Risk [facility]: On a scale of 0 to 10, the patient rates current pain at 3   Behavioral Health Treatment Plan [facility]: Diagnosis and Treatment Plan explained to patient, patient relates understanding diagnosis and is agreeable to Treatment Plan  Assessment    1   Bipolar I disorder, most recent episode mixed, severe without psychotic features   (296 63) (F31 63)    Signatures   Electronically signed by : Marcelo Vargas LCSW; Feb 28 2017 11:39AM EST                       (Author)
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
## 2018-01-16 NOTE — PSYCH
Progress Note  Psychotherapy Provided [facility]: Individual Psychotherapy 45 minutes provided today  Goals addressed in session:   Goals: 2  DChan Kellogg stated that she continues to struggle with the loss of her ferret  She also shared that she has been feeling somewhat down  Discussing how the seasons affect her mood and ways for her to increase use of coping mechanisms for the winter months  Also continuing to work on increasing self care  Giving supportive therapy  A- PRogress - Continuing to work on increasing use of healthy coping mechanisms as well as self care  P-Continue treatment       Pain Scale and Suicide Risk [facility]: Current Pain Assessment: moderate to severe   On a scale of 0 to 10, the patient rates current pain at 5   Behavioral Health Treatment Plan [facility]: Diagnosis and Treatment Plan explained to patient, patient relates understanding diagnosis and is agreeable to Treatment Plan  Assessment    1   Bipolar I disorder, most recent episode mixed, severe without psychotic features   (296 63) (F31 63)    Signatures   Electronically signed by : Martha Garcia LCSW; Nov 15 2017  3:58PM EST                       (Author)

## 2018-01-16 NOTE — PSYCH
Treatment Plan Tracking      #2 Treatment Plan not completed within required time limits due to: Client presented with emotional/behavioral issues that required clinical intervention          Signatures   Electronically signed by : Mk Longoria LCSW; May 24 2016 11:51AM EST                       (Author)

## 2018-01-16 NOTE — PSYCH
Progress Note  Psychotherapy Provided [facility]: Individual Psychotherapy 45 minutes provided today  Goals addressed in session:   Goals: 2  DChan Kellogg stated that she has been frustrated with her 's behavior over the last week  Discussing the issues and potential solutions  She stated that he constantly plays his video game, and isn't helpful with the grandchildren or the household chores  Discussing ways to respond to his behaviors  Also discussing ways for her to assert her self in the relationship  Giving supportive therapy  A- progress - Continuing to work on increasing assertive behavior in her relationship with her   P- Continue treatment       Pain Scale and Suicide Risk [facility]: On a scale of 0 to 10, the patient rates current pain at 4   Behavioral Health Treatment Plan [facility]: Diagnosis and Treatment Plan explained to patient, patient relates understanding diagnosis and is agreeable to Treatment Plan  Assessment    1   Bipolar I disorder, most recent episode mixed, severe without psychotic features   (296 63) (F31 19)    Signatures   Electronically signed by : Allie Woods LCSW; Aug  9 2016 10:27AM EST                       (Author)

## 2018-01-17 NOTE — MISCELLANEOUS
Message  patient notified of normal labs       Signatures   Electronically signed by : Brad Warren DO; Aug 12 2016  4:30PM EST                       (Author)

## 2018-01-17 NOTE — PSYCH
Progress Note  Psychotherapy Provided [facility]: Individual Psychotherapy 45 minutes provided today  Goals addressed in session:   Goals: 2 & 3  D- Bess stated that she continues to struggle with break through pain from her headaches  She also shared that she continues to struggle with the death of her ferret  PT stated that she continues to focus on taking care of her grandchildren in order to be able to cope with the stress in her life  PT shared that she has also been trying to read more for enjoyment  Continuing to work on use of positive thought as well as boundary setting in relationships  Giving supportive therapy  A- Progress - Continuing to work on use of healthy coping mechanisms  P- Continue treatment       Pain Scale and Suicide Risk [facility]: Current Pain Assessment: moderate to severe   On a scale of 0 to 10, the patient rates current pain at 4   Behavioral Health Treatment Plan [employer]: Diagnosis and Treatment Plan explained to patient, patient relates understanding diagnosis and is agreeable to Treatment Plan  Assessment    1   Bipolar I disorder, most recent episode mixed, severe without psychotic features   (296 63) (F31 63)    Signatures   Electronically signed by : Kriss Rogers LCSW; Jun 21 2016 12:01PM EST                       (Author)

## 2018-01-17 NOTE — PSYCH
Treatment Plan Tracking      #2 Treatment Plan not completed within required time limits due to: Client presented with emotional/behavioral issues that required clinical intervention          Signatures   Electronically signed by : Destini Hays LCSW; May 24 2016 11:50AM EST                       (Author)

## 2018-01-17 NOTE — PSYCH
Treatment Plan Tracking      #2 Treatment Plan not completed within required time limits due to: Client presented with emotional/behavioral issues that required clinical intervention          Signatures   Electronically signed by : Matteo Esposito LCSW; May 24 2016 11:53AM EST                       (Author)

## 2018-01-17 NOTE — PSYCH
Progress Note  Psychotherapy Provided [facility]: Individual Psychotherapy 45 minutes provided today  Goals addressed in session:   Goals: 2   D- Bess stated that she is frustrated with recent issues with billing  She feels that they are incorrect in their estimation  Encouraging her to contact the billing office as well as her benefits in order to rectify the situation  Continuing to work on use of coping mechanisms for stress and depression  Giving supportive therapy  A- Progress - Continuing t increase use of coping mechanisms  P- Continue treatment       Pain Scale and Suicide Risk [facility]: Current Pain Assessment: moderate to severe   On a scale of 0 to 10, the patient rates current pain at 4   Behavioral Health Treatment Plan [facility]: Diagnosis and Treatment Plan explained to patient, patient relates understanding diagnosis and is agreeable to Treatment Plan  Assessment    1   Bipolar I disorder, most recent episode mixed, severe without psychotic features   (296 63) (F31 63)    Signatures   Electronically signed by : Marcelo Vargas LCSW; Jul 26 2016  6:15PM EST                       (Author)

## 2018-01-17 NOTE — PSYCH
Treatment Plan Tracking        #3 Treatment Plan not completed within required time limits due to: Client presented with emotional/behavioral issues that required clinical intervention        Signatures   Electronically signed by : Austen Gonzalez LCSW; Sep 27 2016 12:11PM EST                       (Author)

## 2018-01-17 NOTE — PSYCH
Treatment Plan Tracking    #1 Treatment Plan not completed within required time limits due to: Client presented with emotional/behavioral issues that required clinical intervention            Signatures   Electronically signed by : Celina Quiroga LCSW; Sep 26 2017  3:37PM EST                       (Author)

## 2018-01-17 NOTE — PSYCH
Progress Note  Psychotherapy Provided [facility]: Individual Psychotherapy 45 minutes provided today  Goals addressed in session:   Goals: 2  DChan Kellogg stated that she continues to struggle with the loss of her ferret  In addition she stated that her headaches have been worse and he feels that it is due to her level of stress  Processing her emotions and discussing ways for her to cope with her loss a well as ways to increase her level of self care  Giving supportive therapy  A- Progress - Continuing to process her emotions  P- Continue treatment       Pain Scale and Suicide Risk [facility]: Current Pain Assessment: moderate to severe   On a scale of 0 to 10, the patient rates current pain at 5   Behavioral Health Treatment Plan [facility]: Diagnosis and Treatment Plan explained to patient, patient relates understanding diagnosis and is agreeable to Treatment Plan  Assessment    1   Bipolar I disorder, most recent episode mixed, severe without psychotic features   (296 63) (F31 63)    Signatures   Electronically signed by : Drew Knowles LCSW; Jan 24 2017  2:43PM EST                       (Author)

## 2018-01-17 NOTE — PSYCH
Progress Note  Psychotherapy Provided [facility]: Individual Psychotherapy 45 minutes provided today  Goals addressed in session:   Goals: 2  CLEMENT Kellogg stated that she has been struggling with depression over the last several weeks  She stated that she struggles this time of year  Discussing ways for her to cope with her depression  Also discussing how to cope with the stress of the holidays  Continuing to focus on self care as well as boundary setting  Giving supportive therapy  A- Progress- Continuing to process her emotions  P- Continue treatment       Pain Scale and Suicide Risk [facility]: Current Pain Assessment: moderate to severe   On a scale of 0 to 10, the patient rates current pain at 4   Behavioral Health Treatment Plan [facility]: Diagnosis and Treatment Plan explained to patient, patient relates understanding diagnosis and is agreeable to Treatment Plan  Assessment    1   Bipolar I disorder, most recent episode mixed, severe without psychotic features   (296 63) (F31 63)    Signatures   Electronically signed by : Tyree Zhang LCSW; Nov 22 2016  1:10PM EST                       (Author)

## 2018-01-17 NOTE — PSYCH
Progress Note  Psychotherapy Provided [facility]: Individual Psychotherapy 45 minutes provided today  Goals addressed in session:   Goals: 2  CLEMENT Kellogg stated that they have been able to move forward with their kitchen project  She shared that it remains stressful but that she is happy that it is moving forward  Processing her emotions and continuing to work on stress management related to the household projects and also babysitting her grandchildren  Giving supportive therapy  A- Progress - Continuing to process her emotions  P-Continue treatment       Pain Scale and Suicide Risk [facility]: Current Pain Assessment: moderate to severe   On a scale of 0 to 10, the patient rates current pain at 6   Behavioral Health Treatment Plan [facility]: Diagnosis and Treatment Plan explained to patient, patient relates understanding diagnosis and is agreeable to Treatment Plan  Assessment    1   Bipolar I disorder, most recent episode mixed, severe without psychotic features   (296 63) (F31 63)    Signatures   Electronically signed by : Rik George LCSW; Oct 10 2017 11:34AM EST                       (Author)

## 2018-01-17 NOTE — PSYCH
Progress Note  Psychotherapy Provided  [facility]: Individual Psychotherapy 45 minutes provided today  Goals addressed in session:   Goals: 2  DChan Kellogg stated that she continues to experience stress in terms of the home renovations that they are working to complete  They have been experiencing financial stress in conjunction with this  Processing her emotions and discussing ways for her to cope with the stress generated by this  ALso continuing to discuss ways for her to cope with the breakthrough pain from her headaches  Continuing to work on increasing self care  Giving supportive therapy  A- Progress - Continuing to process her emotions  P-Continue treatment       Pain Scale and Suicide Risk  [facility]: Current Pain Assessment: moderate to severe   On a scale of 0 to 10, the patient rates current pain at 6   Behavioral Health Treatment Plan [facility]: Diagnosis and Treatment Plan explained to patient, patient relates understanding diagnosis and is agreeable to Treatment Plan  Assessment    1   Bipolar I disorder, most recent episode mixed, severe without psychotic features   (296 63) (F31 19)    Signatures   Electronically signed by : Kenji Schneider LCSW; Sep 26 2017  3:37PM EST                       (Author)

## 2018-01-17 NOTE — PSYCH
Progress Note  Psychotherapy Provided [facility]: Individual Psychotherapy 45 minutes provided today  Goals addressed in session:   Goals: 3  D- Bess stated that she continues to deal with break through headache pain  Discussing ways to cope with the pain  PT stated that she utilizes the adjustments on the spinal cord stimulator and also tries to focus on other things to deal with the pain  PT focusing on her grandchildren as well as her pets in ordder to cope efectively  Continuing to work on increasing use of her support system  Giving supportive therpay  A- Progress - Continuing to utilize effective coping mechanisms  P- Continue treatment       Pain Scale and Suicide Risk  [facility]: Current Pain Assessment: moderate to severe   On a scale of 0 to 10, the patient rates current pain at 4   Current suicide risk is low   Behavioral Health Treatment Plan [address] [place] [address] [address]: Diagnosis and Treatment Plan explained to patient, patient relates understanding diagnosis and is agreeable to Treatment Plan  Assessment    1   Bipolar I disorder, most recent episode mixed, severe without psychotic features   (296 63) (F31 63)    Signatures   Electronically signed by : Rhiannon Xie LCSW; Mar  8 2016  1:22PM EST                       (Author)

## 2018-01-18 NOTE — PSYCH
Treatment Plan Tracking      #2 Treatment Plan not completed within required time limits due to: Client presented with emotional/behavioral issues that required clinical intervention          Signatures   Electronically signed by : Destini Hays LCSW; May 24 2016 11:52AM EST                       (Author)

## 2018-01-18 NOTE — PSYCH
Progress Note  Psychotherapy Provided [facility]: Individual Psychotherapy 45 minutes provided today  Goals addressed in session:   Goals: 1, 2 & 3  D- Bess stated that she has been In struggling with breakthrough pain from her headaches  In addition, she feels overwhelmed at times with watching her grandchildren if her  doesn't hel Processing her emotions and discussing ways for her to cope with the pain as well as feeling overwhelmed  Continuing to work on increasing her assertiveness with her   Giving supportive therapy  A- progress - Continuing to process her emotions  P- Continue treatment       Pain Scale and Suicide Risk [facility]: Current Pain Assessment: moderate to severe   On a scale of 0 to 10, the patient rates current pain at 6   Behavioral Health Treatment Plan [address] [place] [address] [address]: Diagnosis and Treatment Plan explained to patient, patient relates understanding diagnosis and is agreeable to Treatment Plan  Assessment    1   Bipolar I disorder, most recent episode mixed, severe without psychotic features   (296 63) (F31 63)    Signatures   Electronically signed by : Von Beck LCSW; Mar 28 2017 12:59PM EST                       (Author)

## 2018-01-18 NOTE — PSYCH
Progress Note  Psychotherapy Provided [facility]: Individual Psychotherapy 45 minutes provided today  Goals addressed in session:   Goals: 2  DChan Kellogg stated that she has yaritza doing well over the past week  She presented as upbeat and positive  Discussing her ongoing concerns with her ferrets and fear that more will pass away  Also discussing the stressors related to babysitting her grandchildren  Continuing to work on increasing self care  Giving sportive therapy  A- progress - Continuing to process her emotions  P- Continue treatment       Pain Scale and Suicide Risk [facility]: On a scale of 0 to 10, the patient rates current pain at 4   Behavioral Health Treatment Plan [facility]: Diagnosis and Treatment Plan explained to patient, patient relates understanding diagnosis and is agreeable to Treatment Plan  Assessment    1   Bipolar I disorder, most recent episode mixed, severe without psychotic features   (296 63) (F31 55)    Signatures   Electronically signed by : Sebas Brooke LCSW; Mar  7 2017 11:24AM EST                       (Author)

## 2018-01-18 NOTE — PSYCH
Progress Note  Psychotherapy Provided [facility]: Individual Psychotherapy 45 minutes provided today  Goals addressed in session:   Goals: 2  CLEMENT Kellogg stated that they have been working on [employer]ARC[employer]  Krista plan to remodel their kitchen  Discussing the stressors she has been experiencing in planning this  Also discussing the stress of babysitting the grandchildren  Continuing to work on increasing self care  Giving supportive therapy  A- Progress - Continuing to work on increasing self care  P- Continue treatment       Pain Scale and Suicide Risk [facility]: Current Pain Assessment: moderate to severe   On a scale of 0 to 10, the patient rates current pain at 5   Behavioral Health Treatment Plan [facility]: Diagnosis and Treatment Plan explained to patient, patient relates understanding diagnosis and is agreeable to Treatment Plan  Assessment    1   Bipolar I disorder, most recent episode mixed, severe without psychotic features   (296 63) (F31 47)    Signatures   Electronically signed by : Oxana Lyons LCSW; Aug 15 2017  2:14PM EST                       (Author)

## 2018-01-18 NOTE — PSYCH
Progress Note  Psychotherapy Provided [facility]: Individual Psychotherapy 45 minutes provided today  Goals addressed in session:   Goals: 1 & 3  D- Bess stated that she is continuing to struggle with the intensity of her headaches  She stated that she continues to adjust the intensity of her stimulator  Discussing ways to cope with the pain and discussing following up with her doctors  Continuing to work on utilization of coping mechanisms for stress  PT stated that she continues to spend time with her grandchildren which she feels helps her  Giving supportive therapy  A- Progress - COntinuing to process her emotions  P- COntinue treatment       Pain Scale and Suicide Risk [facility]: Current Pain Assessment: moderate to severe   On a scale of 0 to 10, the patient rates current pain at 7   Current suicide risk is low   Behavioral Health Treatment Plan [facility]: Diagnosis and Treatment Plan explained to patient, patient relates understanding diagnosis and is agreeable to Treatment Plan  Assessment    1   Bipolar I disorder, most recent episode mixed, severe without psychotic features   (296 63) (F31 63)    Signatures   Electronically signed by : Tyree Zhang LCSW; Feb 23 2016 11:44AM EST                       (Author)
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
## 2018-01-18 NOTE — PSYCH
1  Bipolar I disorder, most recent episode mixed, severe without psychotic features   (685 27) (F31 63)      Date of Initial Treatment Plan: 6/11/09  Date of Current Treatment Plan: 5/24/16  Treatment Plan   Strengths/Personal Resources for Self Care: Good mom and grandmother, good at [email or web address], photography, good with pets  Diagnosis:   Axis I: Bipolar I   Axis II: Deferred   Axis III: Diabetes, HTN, foot problems, headaches     Current Challenges/Problems/Needs: Self esteem  Depression/anxiety  Headaches  Long Term Goals:   Be more sure of myself  Be more sure of myself   Target Date: 9/24/16      Feel better   Target Date: 9/24/16      Be able to manage my pain and function   Target Date: 9/24/16      Short Term Objectives:   Goal 1:   See what I do well  Be able to take compliments/positives  Think positive  Target Date: 9/24/16      Goal 2:   Talk about it  Do what I want  Not be afraid to speak  Target Date: 9/24/16      Goal 3:   Relax when i need to  Try to reduce stress  Follow up with doctors  Target Date: 9/24/16      GOAL 1: Modality: Individual 4 x per month Target Date: 9/24/16         GOAL 2: Modality: Individual 4 x per month Target Date: 9/24/16         GOAL 3: Modality: Individual 4 x per month Target Date: 9/24/16         The first scheduled review date is 9/24/16  The expected length of service is Unknown  Level of functioning at initial assessment: 55  The highest level of functioning in the past year was 72  The current level of functioning is 65  Patient Signature: _________________________________ Date/Time: ______________        1  Anxiety disorder (300 00) (F41 9)   2  Asthmatic bronchitis with exacerbation (493 92) (J45 901)   3  Bipolar I disorder, most recent episode mixed, severe without psychotic features   (296 63) (F31 63)   4  Chronic constipation (564 00) (K59 09)   5  Chronic headaches (784 0) (R51)   6   Chronic migraine without aura (346 70) (G43 709)   7  Encounter for postoperative care (V58 49) (Z48 89)   8  Essential hypertension (401 9) (I10)   9  Headache (784 0) (R51)   10  Hyperlipidemia (272 4) (E78 5)   11  Hyponatremia (276 1) (E87 1)   12  Insomnia (780 52) (G47 00)   13  Leukopenia (288 50) (D72 819)   14  Medication overuse headache (339 3) (G44 40)   15  Occipital neuralgia (723 8) (M54 81)   16  Pain syndrome, chronic (338 4) (G89 4)   17   Surgery, elective (V50 9) (Z41 9)     Electronically signed by : Mercedez Davison LCSW; May 24 2016 11:43AM EST                       (Author)

## 2018-01-18 NOTE — PSYCH
Message  Message Free Text Note Form: PT canceled due to inclement weather      Active Problems    1  Anxiety disorder (300 00) (F41 9)   2  Asthmatic bronchitis with exacerbation (493 92) (J45 901)   3  Bipolar I disorder, most recent episode mixed, severe without psychotic features   (296 63) (F31 63)   4  Chronic constipation (564 00) (K59 00)   5  Chronic headaches (784 0) (R51)   6  Chronic migraine without aura (346 70) (G43 709)   7  Encounter for postoperative care (V58 49) (Z48 89)   8  Essential hypertension (401 9) (I10)   9  Headache (784 0) (R51)   10  Hyperlipidemia (272 4) (E78 5)   11  Hyponatremia (276 1) (E87 1)   12  Insomnia (780 52) (G47 00)   13  Leukopenia (288 50) (D72 819)   14  Medication overuse headache (339 3) (G44 40)   15  Occipital neuralgia (723 8) (M54 81)   16  Pain syndrome, chronic (338 4) (G89 4)   17  Surgery, elective (V50 9) (Z41 9)    Current Meds   1  Atenolol-Chlorthalidone 50-25 MG Oral Tablet; 1/2 tab daily  Requested for: 01FGX2417;   Last Rx:19Jan2016 Ordered   2  Azithromycin 250 MG Oral Tablet; take 2 tablets on day 1 then 1 tablet daily x 4 days; Therapy: 12EBU3780 to (Evaluate:24Jan2016)  Requested for: 47AFV8584; Last   Rx:19Jan2016 Ordered   3  BuPROPion HCl ER (XL) 300 MG Oral Tablet Extended Release 24 Hour; Take 1 tablet   daily; Therapy: 25Gho8931 to (Evaluate:11Nov2014)  Requested for: 87WFX3032; Last   Rx:20Xec0664 Ordered   4  Epitol 200 MG Oral Tablet; TAKE 3 TABLET Bedtime; Therapy: 29Shf2142 to (Evaluate:14Oct2014)  Requested for: 54MVU9714; Last   Rx:56Bms2595 Ordered   5  Fish Oil 1000 MG Oral Capsule; Take 1 capsule twice daily; Therapy: (304 0442) to Recorded   6  LORazepam 1 MG Oral Tablet; take 1- 1/2 every 4 hours prn; Therapy: 15INU3948 to (Evaluate:17Nns6754); Last Rx:23Equ1708 Ordered   7  PredniSONE 10 MG Oral Tablet;  Take 6 tabs QD X1, then 5 tabs QD X1, then 4 tabs QD X   1 , then 3 tabs QDX 1, then 2 tabs QD X 1, then 1 tab QD X 1; Therapy: 13OUG3996 to (Evaluate:25Jan2016)  Requested for: 83LZD2141; Last   Rx:19Jan2016 Ordered   8  RisperiDONE 1 MG Oral Tablet; TAKE 1 TABLET AT BEDTIME; Therapy: 58SNS0104 to (Evaluate:11Nov2014)  Requested for: 22Apr2015; Last   OU:37PJY3921; Status: ACTIVE - Renewal Denied Ordered   9  Sertraline HCl - 100 MG Oral Tablet; TAKE 1 TABLET DAILY AS DIRECTED; Therapy: 88MQL3105 to (Evaluate:11Nov2014)  Requested for: 40KTX4884; Last   Rx:12Dfj1071 Ordered   10  Simvastatin 20 MG Oral Tablet; Take 1 tablet daily  Requested for: 23YCU1738; Last    Rx:19Jan2016 Ordered   11  Ziprasidone HCl - 60 MG Oral Capsule; Take 2 Daily with dinner; Therapy: 13FNN7819 to (Evaluate:90Ndx8196)  Requested for: 92KBQ0494; Last    Rx:25Jun2015 Ordered    Allergies    1   No Known Drug Allergies    Signatures   Electronically signed by : Yossi Shore LCSW; Feb 16 2016  9:45AM EST                       (Author)

## 2018-01-23 ENCOUNTER — ALLSCRIPTS OFFICE VISIT (OUTPATIENT)
Dept: OTHER | Facility: OTHER | Age: 58
End: 2018-01-23

## 2018-01-23 NOTE — PSYCH
Progress Note  Psychotherapy Provided [facility]: Individual Psychotherapy 45 minutes provided today  Goals addressed in session:   Goals: 2  D- Bess stated that things have been stressful leading up to the holidays  She shared that she continues to have issues with her 's lack of motivation and assistance  She also discussed the break through pain that she is experiencing with her headaches  Processing her emotions and discussing ways for her to deal with her stress  Giving supportive therapy  A - Progress - Continuing to process her emotions  P-Continue treatment       Pain Scale and Suicide Risk [facility]: Current Pain Assessment: moderate to severe   On a scale of 0 to 10, the patient rates current pain at 6   Behavioral Health Treatment Plan [facility]: Diagnosis and Treatment Plan explained to patient, patient relates understanding diagnosis and is agreeable to Treatment Plan  Assessment    1   Bipolar I disorder, most recent episode mixed, severe without psychotic features   (296 63) (F31 63)    Signatures   Electronically signed by : Drew Knowles LCSW; Dec 19 2017 11:36AM EST                       (Author)

## 2018-01-23 NOTE — PSYCH
Treatment Plan Tracking    #1 Treatment Plan not completed within required time limits due to: Client presented with emotional/behavioral issues that required clinical intervention            Signatures   Electronically signed by : Monster Becerra LCSW; Dec 19 2017 11:36AM EST                       (Author)

## 2018-01-23 NOTE — PSYCH
Treatment Plan Tracking    #1 Treatment Plan not completed within required time limits due to: Client presented with emotional/behavioral issues that required clinical intervention            Signatures   Electronically signed by : Ailyn Berman LCSW; Dec 14 2017  9:41AM EST                       (Author)

## 2018-01-23 NOTE — PSYCH
Progress Note  Psychotherapy Provided [facility]: Individual Psychotherapy 45 minutes provided today  Goals addressed in session:   Goals: 2  DChan Kellogg stated that she has been feeling sick and has been on two rounds of treatment of antibiotics  She stated that she continues to experience stress related to babysitting her grandchildren due to not having much time to herself and also not feeling appreciated by her family  Processing her emotions and discussing ways for her to increase self care  GIving supportive therapy  A- Progress - Continuing to process her emotions  P-Continue treatment       Pain Scale and Suicide Risk [facility]: Current Pain Assessment: moderate to severe   On a scale of 0 to 10, the patient rates current pain at 5   Behavioral Health Treatment Plan [address] [place] [address] [address]: Diagnosis and Treatment Plan explained to patient, patient relates understanding diagnosis and is agreeable to Treatment Plan  Assessment    1   Bipolar I disorder, most recent episode mixed, severe without psychotic features   (296 63) (F31 63)    Signatures   Electronically signed by : Monster Becerra LCSW; Jan 2 2018  1:11PM EST                       (Author)

## 2018-01-23 NOTE — PSYCH
Progress Note  Psychotherapy Provided [facility]: Individual Psychotherapy 45 minutes provided today  Goals addressed in session:   Goals: 2  DChan Kellogg stated that she has been frustrated with her   She stated that he doesn't help with the grandchildren and often complains about his health  She also shared that he claims that he doesn't sleep but yet sleeps for many hours  Processing her emotions and discussing ways for her to cope with her situation as well as ways to communicate her feelings to her   Giving supportive therapy  A- Progress- Continuing to process her emotions  P- Continue treatment       Pain Scale and Suicide Risk [facility]: Current Pain Assessment: moderate to severe   On a scale of 0 to 10, the patient rates current pain at 5   Behavioral Health Treatment Plan [facility]: Diagnosis and Treatment Plan explained to patient, patient relates understanding diagnosis and is agreeable to Treatment Plan  Assessment    1   Bipolar I disorder, most recent episode mixed, severe without psychotic features   (296 63) (F31 63)    Signatures   Electronically signed by : Debi Garber LCSW; Dec 14 2017  9:41AM EST                       (Author)

## 2018-01-23 NOTE — PSYCH
Treatment Plan Tracking    #1 Treatment Plan not completed within required time limits due to: Client presented with emotional/behavioral issues that required clinical intervention            Signatures   Electronically signed by : Colin Covarrubias LCSW; Jan 2 2018  1:12PM EST                       (Author)

## 2018-01-24 VITALS
BODY MASS INDEX: 35.99 KG/M2 | DIASTOLIC BLOOD PRESSURE: 80 MMHG | TEMPERATURE: 100.9 F | HEART RATE: 90 BPM | WEIGHT: 216 LBS | HEIGHT: 65 IN | SYSTOLIC BLOOD PRESSURE: 148 MMHG

## 2018-01-24 NOTE — PSYCH
Progress Note  Psychotherapy Provided [facility]: Individual Psychotherapy 45 minutes provided today  Goals addressed in session:   Goals: 1, 2 & 3  D- Bess stated that she has been feeling stressed with her 's lack of participation in caring for their grandchildren  She also continues to struggle with her headache pain  Discussing ways for her to assert herself with her   ALso discussing use of coping mechanisms for the pain  reviewing and revising her treatment plan  Giving supportive therapy  A- Progress - Continuing to process her emotions  P-Continue treatment       Pain Scale and Suicide Risk [facility]: Current Pain Assessment: moderate to severe   On a scale of 0 to 10, the patient rates current pain at 7   Behavioral Health Treatment Plan [facility]: Diagnosis and Treatment Plan explained to patient, patient relates understanding diagnosis and is agreeable to Treatment Plan  Assessment    1   Bipolar I disorder, most recent episode mixed, severe without psychotic features   (296 63) (F31 63)    Signatures   Electronically signed by : Tong Aguilar, CARMEN; Jan 23 2018 11:21AM EST                       (Author)

## 2018-01-24 NOTE — PSYCH
Date of Initial Treatment Plan: 6/11/09  Date of Current Treatment Plan: 1/23/18  Strengths/Personal Resources for Self Care: Good mom and grandmother, good at crafts, photography, good with pets  Diagnosis:   Axis I: Bipolar I   Axis II: Deferred   Axis III: HTN, diabetes, headaches     Area of Needs: Sanity  Headaches  Self esteem  Long Term Goals:   Be able to stay calm   Target Date: 5/23/18      Be able to manage my pain and function   Target Date: 5/23/18      Be more sure of myself     Short Term Objectives:   Goal 1:   Continue with medications  Set boundaries with [facility] and other hobbies    Target Date: 5/23/18      Goal 2:   Relax when I need to  Try to reduce stress  Follow up with doctors  Target Date: 5/23/18      Goal 3:   See what I do well  Be able to take compliments/positives  Think positive  Target Date: 5/23/18      GOAL 1: Modality: Individual 4 x per month Target Date: 5/23/18       The person(s) responsible for carrying out the plan is Bess  GOAL 2: Modality: Individual 4 x per month Target Date: 5/23/18       The person(s) responsible for carrying out the plan is Bess  GOAL 3: Modality: Individual 4 x per month Target Date: 5/23/18         The person(s) responsible for carrying out the plan is Bess  The first scheduled review date is 5/23/18                 Patient Signature: _________________________________ Date/Time: ______________       Electronically signed by : Kriss Rogers LCSW; Jan 23 2018  9:43AM EST                       (Author)

## 2018-01-29 ENCOUNTER — OFFICE VISIT (OUTPATIENT)
Dept: FAMILY MEDICINE CLINIC | Facility: CLINIC | Age: 58
End: 2018-01-29
Payer: MEDICARE

## 2018-01-29 VITALS
RESPIRATION RATE: 14 BRPM | TEMPERATURE: 98.1 F | SYSTOLIC BLOOD PRESSURE: 122 MMHG | HEART RATE: 74 BPM | DIASTOLIC BLOOD PRESSURE: 80 MMHG | HEIGHT: 65 IN | WEIGHT: 220 LBS | BODY MASS INDEX: 36.65 KG/M2

## 2018-01-29 DIAGNOSIS — G89.29 CHRONIC INTRACTABLE HEADACHE, UNSPECIFIED HEADACHE TYPE: ICD-10-CM

## 2018-01-29 DIAGNOSIS — K59.09 CHRONIC CONSTIPATION: ICD-10-CM

## 2018-01-29 DIAGNOSIS — E78.5 HYPERLIPIDEMIA, UNSPECIFIED HYPERLIPIDEMIA TYPE: Primary | ICD-10-CM

## 2018-01-29 DIAGNOSIS — Z12.39 SCREENING FOR MALIGNANT NEOPLASM OF BREAST: ICD-10-CM

## 2018-01-29 DIAGNOSIS — R51.9 CHRONIC INTRACTABLE HEADACHE, UNSPECIFIED HEADACHE TYPE: ICD-10-CM

## 2018-01-29 DIAGNOSIS — D72.819 LEUKOPENIA, UNSPECIFIED TYPE: ICD-10-CM

## 2018-01-29 PROBLEM — M14.679 CHARCOT'S JOINT OF FOOT: Status: ACTIVE | Noted: 2017-02-22

## 2018-01-29 PROCEDURE — 99214 OFFICE O/P EST MOD 30 MIN: CPT | Performed by: FAMILY MEDICINE

## 2018-01-29 RX ORDER — LORAZEPAM 1 MG/1
TABLET ORAL
COMMUNITY
Start: 2013-10-08 | End: 2018-06-28

## 2018-01-29 RX ORDER — ZIPRASIDONE HYDROCHLORIDE 60 MG/1
CAPSULE ORAL
COMMUNITY
Start: 2013-12-26

## 2018-01-29 RX ORDER — BUPROPION HYDROCHLORIDE 300 MG/1
1 TABLET ORAL DAILY
COMMUNITY
Start: 2011-08-25

## 2018-01-29 RX ORDER — SERTRALINE HYDROCHLORIDE 100 MG/1
1 TABLET, FILM COATED ORAL DAILY
COMMUNITY
Start: 2013-10-08

## 2018-01-29 RX ORDER — CARBAMAZEPINE 200 MG/1
3 TABLET ORAL
COMMUNITY
Start: 2011-08-25 | End: 2019-05-14 | Stop reason: SDUPTHER

## 2018-01-29 RX ORDER — SIMVASTATIN 20 MG
20 TABLET ORAL DAILY
Qty: 90 TABLET | Refills: 1 | Status: SHIPPED | OUTPATIENT
Start: 2018-01-29 | End: 2018-08-01 | Stop reason: SDUPTHER

## 2018-01-29 RX ORDER — CHLORAL HYDRATE 500 MG
1 CAPSULE ORAL 2 TIMES DAILY
COMMUNITY

## 2018-01-29 RX ORDER — SIMVASTATIN 20 MG
1 TABLET ORAL DAILY
COMMUNITY
End: 2018-01-29 | Stop reason: SDUPTHER

## 2018-01-29 RX ORDER — ATENOLOL AND CHLORTHALIDONE TABLET 50; 25 MG/1; MG/1
0.5 TABLET ORAL DAILY
COMMUNITY
End: 2018-06-21 | Stop reason: SDUPTHER

## 2018-01-29 RX ORDER — RISPERIDONE 1 MG/1
1 TABLET, FILM COATED ORAL DAILY
COMMUNITY
Start: 2018-01-01

## 2018-01-29 NOTE — PROGRESS NOTES
Assessment/Plan:     Diagnoses and all orders for this visit:    Hyperlipidemia, unspecified hyperlipidemia type  -     simvastatin (ZOCOR) 20 mg tablet; Take 1 tablet (20 mg total) by mouth daily  -     Comprehensive metabolic panel; Future  -     Lipid panel    Screening for malignant neoplasm of breast  -     Mammo screening bilateral w cad; Future    Leukopenia, unspecified type  -     CBC and differential    Chronic constipation    Chronic intractable headache, unspecified headache type    Other orders  -     atenolol-chlorthalidone (TENORETIC) 50-25 mg per tablet; Take 0 5 tablets by mouth daily  -     buPROPion (WELLBUTRIN XL) 300 mg 24 hr tablet; Take 1 tablet by mouth daily  -     carBAMazepine (EPITOL) 200 mg tablet; Take 3 tablets by mouth  -     Omega-3 Fatty Acids (FISH OIL) 1,000 mg; Take 1 capsule by mouth 2 (two) times a day  -     LORazepam (ATIVAN) 1 mg tablet; Take by mouth  -     risperiDONE (RisperDAL) 1 mg tablet;   -     Discontinue: simvastatin (ZOCOR) 20 mg tablet; Take 1 tablet by mouth daily  -     sertraline (ZOLOFT) 100 mg tablet; Take 1 tablet by mouth daily  -     ziprasidone (GEODON) 60 mg capsule; Take by mouth        REFILLED SIMVASTATIN   MAMMO ORDERED  OTHERWISE IS DOING WELL  CONT CURRENT MEDS  HEADACHES ARE STABLE  F/I IN 6 MONTHS  REVIEWED LABS AND PAST RECENT HISTORY    Subjective:      Patient ID: Juventino Mack is a 62 y o  female  HPI HERE FOR 6 MONTH CHECKUP  NO ACUTE COMPLAINTS  NEEDS REFILLS OF SIMVASTATIN          The following portions of the patient's history were reviewed and updated as appropriate: allergies, current medications, past family history, past medical history, past social history, past surgical history and problem list     Review of Systems   Constitutional: Negative  HENT: Negative  Eyes: Negative  Respiratory: Negative  Cardiovascular: Negative  Gastrointestinal: Negative  Endocrine: Negative  Genitourinary: Negative  Musculoskeletal: Negative  Skin: Negative  Allergic/Immunologic: Negative  Neurological: Negative  Hematological: Negative  Psychiatric/Behavioral: Negative  All other systems reviewed and are negative  Objective:     Physical Exam   Constitutional: She is oriented to person, place, and time  She appears well-developed and well-nourished  HENT:   Head: Normocephalic and atraumatic  Right Ear: External ear normal    Left Ear: External ear normal    Mouth/Throat: Oropharynx is clear and moist    Eyes: Conjunctivae and EOM are normal  Pupils are equal, round, and reactive to light  Neck: Normal range of motion  Cardiovascular: Normal rate, regular rhythm and normal heart sounds  Pulmonary/Chest: Effort normal and breath sounds normal    Abdominal: Soft  Bowel sounds are normal    Musculoskeletal: Normal range of motion  Neurological: She is alert and oriented to person, place, and time  She has normal reflexes  Skin: Skin is warm and dry  Psychiatric: She has a normal mood and affect  Her behavior is normal  Judgment and thought content normal    Nursing note and vitals reviewed

## 2018-02-01 ENCOUNTER — APPOINTMENT (OUTPATIENT)
Dept: LAB | Facility: CLINIC | Age: 58
End: 2018-02-01
Payer: MEDICARE

## 2018-02-01 ENCOUNTER — HOSPITAL ENCOUNTER (OUTPATIENT)
Dept: MAMMOGRAPHY | Facility: CLINIC | Age: 58
Discharge: HOME | End: 2018-02-01
Payer: MEDICARE

## 2018-02-01 DIAGNOSIS — E78.5 HYPERLIPIDEMIA, UNSPECIFIED HYPERLIPIDEMIA TYPE: ICD-10-CM

## 2018-02-01 DIAGNOSIS — Z12.39 SCREENING FOR MALIGNANT NEOPLASM OF BREAST: ICD-10-CM

## 2018-02-01 LAB
ALBUMIN SERPL BCP-MCNC: 4 G/DL (ref 3.5–5)
ALP SERPL-CCNC: 89 U/L (ref 46–116)
ALT SERPL W P-5'-P-CCNC: 26 U/L (ref 12–78)
ANION GAP SERPL CALCULATED.3IONS-SCNC: 7 MMOL/L (ref 4–13)
AST SERPL W P-5'-P-CCNC: 17 U/L (ref 5–45)
BASOPHILS # BLD AUTO: 0.03 THOUSANDS/ΜL (ref 0–0.1)
BASOPHILS NFR BLD AUTO: 1 % (ref 0–1)
BILIRUB SERPL-MCNC: 0.33 MG/DL (ref 0.2–1)
BUN SERPL-MCNC: 11 MG/DL (ref 5–25)
CALCIUM SERPL-MCNC: 8.9 MG/DL (ref 8.3–10.1)
CHLORIDE SERPL-SCNC: 99 MMOL/L (ref 100–108)
CHOLEST SERPL-MCNC: 187 MG/DL (ref 50–200)
CO2 SERPL-SCNC: 29 MMOL/L (ref 21–32)
CREAT SERPL-MCNC: 0.83 MG/DL (ref 0.6–1.3)
EOSINOPHIL # BLD AUTO: 0.18 THOUSAND/ΜL (ref 0–0.61)
EOSINOPHIL NFR BLD AUTO: 4 % (ref 0–6)
ERYTHROCYTE [DISTWIDTH] IN BLOOD BY AUTOMATED COUNT: 12.4 % (ref 11.6–15.1)
GFR SERPL CREATININE-BSD FRML MDRD: 79 ML/MIN/1.73SQ M
GLUCOSE P FAST SERPL-MCNC: 101 MG/DL (ref 65–99)
HCT VFR BLD AUTO: 42.4 % (ref 34.8–46.1)
HDLC SERPL-MCNC: 53 MG/DL (ref 40–60)
HGB BLD-MCNC: 15.2 G/DL (ref 11.5–15.4)
LDLC SERPL CALC-MCNC: 101 MG/DL (ref 0–100)
LYMPHOCYTES # BLD AUTO: 1.2 THOUSANDS/ΜL (ref 0.6–4.47)
LYMPHOCYTES NFR BLD AUTO: 27 % (ref 14–44)
MCH RBC QN AUTO: 34.9 PG (ref 26.8–34.3)
MCHC RBC AUTO-ENTMCNC: 35.8 G/DL (ref 31.4–37.4)
MCV RBC AUTO: 97 FL (ref 82–98)
MONOCYTES # BLD AUTO: 0.39 THOUSAND/ΜL (ref 0.17–1.22)
MONOCYTES NFR BLD AUTO: 9 % (ref 4–12)
NEUTROPHILS # BLD AUTO: 2.61 THOUSANDS/ΜL (ref 1.85–7.62)
NEUTS SEG NFR BLD AUTO: 59 % (ref 43–75)
NRBC BLD AUTO-RTO: 0 /100 WBCS
PLATELET # BLD AUTO: 247 THOUSANDS/UL (ref 149–390)
PMV BLD AUTO: 8.7 FL (ref 8.9–12.7)
POTASSIUM SERPL-SCNC: 4.4 MMOL/L (ref 3.5–5.3)
PROT SERPL-MCNC: 7.5 G/DL (ref 6.4–8.2)
RBC # BLD AUTO: 4.36 MILLION/UL (ref 3.81–5.12)
SODIUM SERPL-SCNC: 135 MMOL/L (ref 136–145)
TRIGL SERPL-MCNC: 165 MG/DL
WBC # BLD AUTO: 4.43 THOUSAND/UL (ref 4.31–10.16)

## 2018-02-01 PROCEDURE — 36415 COLL VENOUS BLD VENIPUNCTURE: CPT | Performed by: FAMILY MEDICINE

## 2018-02-01 PROCEDURE — 85025 COMPLETE CBC W/AUTO DIFF WBC: CPT | Performed by: FAMILY MEDICINE

## 2018-02-01 PROCEDURE — 80053 COMPREHEN METABOLIC PANEL: CPT

## 2018-02-01 PROCEDURE — 77067 SCR MAMMO BI INCL CAD: CPT

## 2018-02-01 PROCEDURE — 80061 LIPID PANEL: CPT | Performed by: FAMILY MEDICINE

## 2018-02-02 ENCOUNTER — HOSPITAL ENCOUNTER (OUTPATIENT)
Dept: ULTRASOUND IMAGING | Facility: CLINIC | Age: 58
Discharge: HOME | End: 2018-02-02
Payer: MEDICARE

## 2018-02-02 DIAGNOSIS — R92.8 ABNORMAL MAMMOGRAM: ICD-10-CM

## 2018-02-02 PROCEDURE — 76642 ULTRASOUND BREAST LIMITED: CPT

## 2018-02-02 NOTE — PROGRESS NOTES
Met with patient and Dr Miya Boland regarding recommendation for;      ___x__ RIGHT ______LEFT      ___x__Ultrasound guided  ______Stereotactic  Breast biopsy  ___x__Verbalized understanding        Blood thinners:  _____yes __x___no    Date stopped: ___n/a________    Biopsy teaching sheet given:  ___x____yes ______no

## 2018-02-06 ENCOUNTER — OFFICE VISIT (OUTPATIENT)
Dept: BEHAVIORAL/MENTAL HEALTH CLINIC | Facility: CLINIC | Age: 58
End: 2018-02-06
Payer: MEDICARE

## 2018-02-06 DIAGNOSIS — F31.63 BIPOLAR I DISORDER, MOST RECENT EPISODE MIXED, SEVERE WITHOUT PSYCHOTIC FEATURES (HCC): Primary | ICD-10-CM

## 2018-02-06 PROCEDURE — 90834 PSYTX W PT 45 MINUTES: CPT | Performed by: SOCIAL WORKER

## 2018-02-06 NOTE — PSYCH
Psychotherapy Provided: Individual Psychotherapy 45 minutes     Length of time in session: 45 minutes, follow up in 1 week    Goals addressed in session: Goal 1     Pain:      moderate to severe    5    Current suicide risk : Low     DChan Kellogg stated that she has to have a biopsy done on her breast due to a lump that was discovered recently  She stated that she does have a family history of breast cancer, however, the doctor feels as though it is not an issue to worry about,  Discussing the ongoing stress that she is experiencing  Discussing ways for her to manage the stress and increase self care  Giving supportive therapy  A- Progress - Continuing to work on stress reduction  P - Continue treatment    [address]: Diagnosis and Treatment Plan explained to Patrick Quiñonez relates understanding diagnosis and is agreeable to Treatment Plan   Yes

## 2018-02-08 ENCOUNTER — OFFICE VISIT (OUTPATIENT)
Dept: FAMILY MEDICINE CLINIC | Facility: CLINIC | Age: 58
End: 2018-02-08
Payer: MEDICARE

## 2018-02-08 VITALS
TEMPERATURE: 98.4 F | HEART RATE: 72 BPM | DIASTOLIC BLOOD PRESSURE: 82 MMHG | HEIGHT: 63 IN | WEIGHT: 227 LBS | BODY MASS INDEX: 40.22 KG/M2 | SYSTOLIC BLOOD PRESSURE: 126 MMHG

## 2018-02-08 DIAGNOSIS — J20.9 ACUTE WHEEZY BRONCHITIS: Primary | ICD-10-CM

## 2018-02-08 PROCEDURE — 99213 OFFICE O/P EST LOW 20 MIN: CPT | Performed by: FAMILY MEDICINE

## 2018-02-08 RX ORDER — PROMETHAZINE HYDROCHLORIDE AND CODEINE PHOSPHATE 6.25; 1 MG/5ML; MG/5ML
5 SYRUP ORAL EVERY 4 HOURS PRN
Qty: 120 ML | Refills: 0 | Status: SHIPPED | OUTPATIENT
Start: 2018-02-08 | End: 2018-06-28

## 2018-02-08 RX ORDER — PREDNISONE 10 MG/1
TABLET ORAL
Qty: 21 TABLET | Refills: 0 | Status: SHIPPED | OUTPATIENT
Start: 2018-02-08 | End: 2018-06-28

## 2018-02-08 RX ORDER — CEFUROXIME AXETIL 500 MG/1
500 TABLET ORAL EVERY 12 HOURS SCHEDULED
Qty: 20 TABLET | Refills: 0 | Status: SHIPPED | OUTPATIENT
Start: 2018-02-08 | End: 2018-02-18

## 2018-02-08 NOTE — PROGRESS NOTES
Assessment/Plan:   Diagnoses and all orders for this visit:    Acute wheezy bronchitis  -     cefuroxime (CEFTIN) 500 mg tablet; Take 1 tablet (500 mg total) by mouth every 12 (twelve) hours for 10 days  -     predniSONE 10 mg tablet; 6 tabs on Day 1,5 tabs on Day 2,4 tabs on Day 3,3 tabs on Day 4,2 tabs on  Day 5 And 1 tab on Day 6  -     promethazine-codeine (PHENERGAN WITH CODEINE) 6 25-10 mg/5 mL syrup; Take 5 mL by mouth every 4 (four) hours as needed for cough        Chief Complaint   Patient presents with    Cough     thinks has bronchitis         Subjective:      Patient ID: Akhil Bermudez is a 62 y o  female  Here for cough and wheeze  Started a few days ago  Has gotten worse   Was treated for this 1 month ago  Worse at night and cannot sleep        The following portions of the patient's history were reviewed and updated as appropriate: allergies, current medications, past family history, past medical history, past social history, past surgical history and problem list     Review of Systems   Constitutional: Negative  HENT: Negative  Eyes: Negative  Respiratory: Positive for cough and wheezing  Cardiovascular: Negative  Gastrointestinal: Negative  Endocrine: Negative  Genitourinary: Negative  Musculoskeletal: Negative  Skin: Negative  Allergic/Immunologic: Negative  Neurological: Negative  Hematological: Negative  Psychiatric/Behavioral: Negative  All other systems reviewed and are negative  Objective:     Physical Exam   Constitutional: She appears well-developed and well-nourished  HENT:   Head: Normocephalic and atraumatic  Right Ear: External ear normal    Left Ear: External ear normal    Nose: Nose normal    Mouth/Throat: Oropharynx is clear and moist    Eyes: Conjunctivae are normal  Pupils are equal, round, and reactive to light  Neck: Normal range of motion  Neck supple  Cardiovascular: Normal rate and regular rhythm  Pulmonary/Chest:   B/l wheeze and rhonchi   Nursing note and vitals reviewed

## 2018-02-13 ENCOUNTER — OFFICE VISIT (OUTPATIENT)
Dept: BEHAVIORAL/MENTAL HEALTH CLINIC | Facility: CLINIC | Age: 58
End: 2018-02-13
Payer: MEDICARE

## 2018-02-13 DIAGNOSIS — F31.63 BIPOLAR I DISORDER, MOST RECENT EPISODE MIXED, SEVERE WITHOUT PSYCHOTIC FEATURES (HCC): Primary | ICD-10-CM

## 2018-02-13 PROCEDURE — 90834 PSYTX W PT 45 MINUTES: CPT | Performed by: SOCIAL WORKER

## 2018-02-13 NOTE — PSYCH
Psychotherapy Provided: Individual Psychotherapy 45 minutes     Length of time in session: 45 minutes, follow up in 1 week    Goals addressed in session: Goal 1     Pain:      moderate to severe    5    Current suicide risk : Low     D- Bess stated that she continues to experience stress and a feeling of being overwhelmed in caring for her grandchildren due to her 's lack of assistance  She shared that her son in law will soon be moving to day shift which should free up more of her time to increase self care  Discussing ways for he to assert herself with her  and delegate more responsibility to him  Giving supportive therapy  A- Progress - Continuing to process her emotions  P-Continue treatment    [address]: Diagnosis and Treatment Plan explained to Sari Rivas relates understanding diagnosis and is agreeable to Treatment Plan   Yes

## 2018-02-19 ENCOUNTER — HOSPITAL ENCOUNTER (OUTPATIENT)
Dept: MAMMOGRAPHY | Facility: CLINIC | Age: 58
Discharge: HOME | End: 2018-02-19

## 2018-02-19 ENCOUNTER — HOSPITAL ENCOUNTER (OUTPATIENT)
Dept: ULTRASOUND IMAGING | Facility: CLINIC | Age: 58
Discharge: HOME | End: 2018-02-19
Admitting: RADIOLOGY
Payer: MEDICARE

## 2018-02-19 ENCOUNTER — HOSPITAL ENCOUNTER (OUTPATIENT)
Dept: ULTRASOUND IMAGING | Facility: CLINIC | Age: 58
Discharge: HOME | End: 2018-02-19
Payer: MEDICARE

## 2018-02-19 ENCOUNTER — TRANSCRIBE ORDERS (OUTPATIENT)
Dept: ADMINISTRATIVE | Facility: HOSPITAL | Age: 58
End: 2018-02-19

## 2018-02-19 VITALS — HEART RATE: 68 BPM | SYSTOLIC BLOOD PRESSURE: 120 MMHG | DIASTOLIC BLOOD PRESSURE: 78 MMHG

## 2018-02-19 DIAGNOSIS — R92.8 ABNORMAL ULTRASOUND OF BREAST: ICD-10-CM

## 2018-02-19 DIAGNOSIS — N63.0 LUMP OR MASS IN BREAST: ICD-10-CM

## 2018-02-19 DIAGNOSIS — R92.8 ABNORMAL MAMMOGRAM: ICD-10-CM

## 2018-02-19 PROCEDURE — 76942 ECHO GUIDE FOR BIOPSY: CPT

## 2018-02-19 PROCEDURE — 10160 PNXR ASPIR ABSC HMTMA BULLA: CPT

## 2018-02-19 RX ORDER — LIDOCAINE HYDROCHLORIDE 10 MG/ML
4 INJECTION, SOLUTION INFILTRATION; PERINEURAL ONCE
Status: COMPLETED | OUTPATIENT
Start: 2018-02-19 | End: 2018-02-19

## 2018-02-19 RX ORDER — SODIUM BICARBONATE 42 MG/ML
1 INJECTION, SOLUTION INTRAVENOUS ONCE
Status: COMPLETED | OUTPATIENT
Start: 2018-02-19 | End: 2018-02-19

## 2018-02-19 RX ADMIN — SODIUM BICARBONATE 0.5 MEQ: 42 SOLUTION INTRAVENOUS at 10:16

## 2018-02-19 RX ADMIN — LIDOCAINE HYDROCHLORIDE 4 ML: 10 INJECTION, SOLUTION INFILTRATION; PERINEURAL at 10:16

## 2018-02-19 NOTE — DISCHARGE INSTR - OTHER ORDERS
POST LARGE CORE BREAST BIOPSY PATIENT INFORMATION      1  Place an ice pack inside your bra over the top of the dressing every hour for 20 minutes (20 minutes on, 60 minutes off) Do this until bedtime  2  Do not shower or bathe until the following morning       3  You may bathe your breast carefully with the steri-strips in place  Be careful    Not to loosen them  The steri-strips will fall off in 3-5 days  4  You may have mild discomfort, and you may have some bruising where the   Needle entered the skin  This should clear within 5-7 days  5  If you need medicine for discomfort, take acetaminophen products such as   Tylenol  You may also take Advil or Motrin products  6  Do not participate in strenuous activities such as-tennis, aerobics, skiing,  Weight lifting, etc  for 24 hours  Refrain from swimming for 72 hours  7  Wearing a bra for sleeping may be more comfortable for the first 24-48 hours  8  Watch for continued bleeding, pain or fever over 101     For procedures done at the [address] [place] [address] call:  Guzman Stephens RN at [phone] or  Carlita Topete RN at [phone]  After 4 PM call the Interventional Radiology Department at [phone] and ask to speak with the nurse on call  For procedures performed at [address] [place] [address] call: The Radiology Nurse at [phone]    After 4 PM call your physician, or go to the Emergency Department  9          The final results of your biopsy are usually available within one week

## 2018-02-19 NOTE — PROGRESS NOTES
Procedure type:    __X___ultrasound guided (CYST ASPIRATION)  _____stereotactic    Breast:    _____Left ___X__Right    Location: 12-1:00 2CM FROM NIPPLE    Needle: 18G BD    # of passes: 1    Clip: NONE    Performed by: Dr Julio César Hoffmann held for 5 minutes by: Bonnye Gosselin Steri Strips:    _____yes __X___no    Band aid:    __X___yes_____no    Tape and guaze:    _____yes __X___no    Tolerated procedure:    __X___yes _____no

## 2018-02-20 ENCOUNTER — OFFICE VISIT (OUTPATIENT)
Dept: BEHAVIORAL/MENTAL HEALTH CLINIC | Facility: CLINIC | Age: 58
End: 2018-02-20
Payer: MEDICARE

## 2018-02-20 DIAGNOSIS — F31.63 BIPOLAR I DISORDER, MOST RECENT EPISODE MIXED, SEVERE WITHOUT PSYCHOTIC FEATURES (HCC): Primary | ICD-10-CM

## 2018-02-20 PROCEDURE — 90834 PSYTX W PT 45 MINUTES: CPT | Performed by: SOCIAL WORKER

## 2018-02-20 NOTE — PSYCH
Psychotherapy Provided: Individual Psychotherapy 45 minutes     Length of time in session: 45 minutes, follow up in 1 week    Goals addressed in session: Goal 1     Pain:      moderate to severe    5    Current suicide risk : Low     D- Bess stated that she continues to struggle with the stress in her life  She also stated that it is sometimes difficult to deal with the headaches and bronchitis that she has been fighting  Processing her emotions and discussing delegating tasks to her  as well as increasing self care  Giving supportive therapy  A- Progress - Continuing to process her emotions  P --Continue treatment    Behavioral Health Treatment Plan [facility]: Diagnosis and Treatment Plan explained to Aleksandra Ames relates understanding diagnosis and is agreeable to Treatment Plan   Yes

## 2018-02-20 NOTE — PROGRESS NOTES
Post procedure call completed on 2/20/18 at 1113    Bleeding: _____yes __x___no    Pain: _____yes ___x___no    Redness/Swelling: ______yes ___x___no    Band aid removed: _____yes __x___no    Steri-Strips intact: ______yes _____no  N/A

## 2018-02-27 ENCOUNTER — OFFICE VISIT (OUTPATIENT)
Dept: BEHAVIORAL/MENTAL HEALTH CLINIC | Facility: CLINIC | Age: 58
End: 2018-02-27
Payer: MEDICARE

## 2018-02-27 DIAGNOSIS — F31.63 BIPOLAR I DISORDER, MOST RECENT EPISODE MIXED, SEVERE WITHOUT PSYCHOTIC FEATURES (HCC): Primary | ICD-10-CM

## 2018-02-27 PROCEDURE — 90834 PSYTX W PT 45 MINUTES: CPT | Performed by: SOCIAL WORKER

## 2018-02-27 NOTE — PSYCH
Psychotherapy Provided: Individual Psychotherapy 45 minutes     Length of time in session: 45 minutes, follow up in 1 week    Goals addressed in session: Goal 1     Pain:      moderate to severe    5    Current suicide risk : Low     D- Bess stated that she has been feeling tired due to issues with bronchitis as well as her time with her grandchildren  She stated that she has also been struggling with her ferrets getting older and having health issues  Processing her emotions and discussing ways for her to cope with her current stressors  Also discussing her frustration with her  and ways to assert herself in the relationship  Giving supportive therapy  A- Progress - Continuing to process her emotions  P-Continue treatment    [address]: Diagnosis and Treatment Plan explained to Sohan Corona relates understanding diagnosis and is agreeable to Treatment Plan   Yes

## 2018-03-06 ENCOUNTER — OFFICE VISIT (OUTPATIENT)
Dept: BEHAVIORAL/MENTAL HEALTH CLINIC | Facility: CLINIC | Age: 58
End: 2018-03-06
Payer: MEDICARE

## 2018-03-06 DIAGNOSIS — F31.63 BIPOLAR I DISORDER, MOST RECENT EPISODE MIXED, SEVERE WITHOUT PSYCHOTIC FEATURES (HCC): Primary | ICD-10-CM

## 2018-03-06 PROCEDURE — 90834 PSYTX W PT 45 MINUTES: CPT | Performed by: SOCIAL WORKER

## 2018-03-06 NOTE — PSYCH
Psychotherapy Provided: Individual Psychotherapy 45 minutes     Length of time in session: 45 minutes, follow up in 1 week    Goals addressed in session: Goal 1     Pain:      moderate to severe    5    Current suicide risk : Low     D- Bess stated that she is feeling stressed about the impending inclement weather due to having to clear the snow  Discussing the other stressors in her life and ways for her to manage her stress in a healthy manner  Continuing to work on use of healthy coping mechanisms or stress  Giving supportive therapy  A- Progress - Continuing to process her emotions  P-Continue treatment    [address]: Diagnosis and Treatment Plan explained to Katlyn Lorenz relates understanding diagnosis and is agreeable to Treatment Plan   Yes

## 2018-03-20 ENCOUNTER — OFFICE VISIT (OUTPATIENT)
Dept: BEHAVIORAL/MENTAL HEALTH CLINIC | Facility: CLINIC | Age: 58
End: 2018-03-20
Payer: MEDICARE

## 2018-03-20 DIAGNOSIS — F31.63 BIPOLAR I DISORDER, MOST RECENT EPISODE MIXED, SEVERE WITHOUT PSYCHOTIC FEATURES (HCC): Primary | ICD-10-CM

## 2018-03-20 PROCEDURE — 90834 PSYTX W PT 45 MINUTES: CPT | Performed by: SOCIAL WORKER

## 2018-03-20 NOTE — PSYCH
Psychotherapy Provided: Individual Psychotherapy 45 minutes     Length of time in session: 45 minutes, follow up in 1 week    Goals addressed in session: Goal 1 and Goal 2     Pain:      moderate to severe    4    Current suicide risk : Low     D- Bess stated that she has been tired and still recovering from bronchitis  In addition, she stated that she is very sad because one of her ferrets passed away  Processing her emotions and discussing ways for her to deal with her sadness  Also continuing to discuss ways for her to cope with the chronic pain from her headaches and practice self care  Giving supportive therapy  A- Progress - Continuing to process her emotions  P-Continue treatment    [address]: Diagnosis and Treatment Plan explained to Ladarius  relates understanding diagnosis and is agreeable to Treatment Plan   Yes

## 2018-04-03 ENCOUNTER — OFFICE VISIT (OUTPATIENT)
Dept: BEHAVIORAL/MENTAL HEALTH CLINIC | Facility: CLINIC | Age: 58
End: 2018-04-03
Payer: MEDICARE

## 2018-04-03 DIAGNOSIS — F31.63 BIPOLAR I DISORDER, MOST RECENT EPISODE MIXED, SEVERE WITHOUT PSYCHOTIC FEATURES (HCC): Primary | ICD-10-CM

## 2018-04-03 PROCEDURE — 90834 PSYTX W PT 45 MINUTES: CPT | Performed by: SOCIAL WORKER

## 2018-04-03 NOTE — PSYCH
Psychotherapy Provided: Individual Psychotherapy 45 minutes     Length of time in session: 45 minutes, follow up in 1 week    Goals addressed in session: Goal 1     Pain:      moderate to severe    4    Current suicide risk : Low     D- Bess stated that she has been feeling tired  She continues to struggle with the loss of her ferret  In addition, she stated that she continues to become frustrated with her  due to his negative attitude and constant health complaints  Processing her emotions and discussing ways for her to cope with the stressors in her life  Also discussing increased use of coping mechanisms  Giving supportive therapy  A- Progress - Continuing to process her emotions  P-Continue treatment    [address]: Diagnosis and Treatment Plan explained to Giovanni Chou relates understanding diagnosis and is agreeable to Treatment Plan   Yes

## 2018-04-10 ENCOUNTER — OFFICE VISIT (OUTPATIENT)
Dept: BEHAVIORAL/MENTAL HEALTH CLINIC | Facility: CLINIC | Age: 58
End: 2018-04-10
Payer: MEDICARE

## 2018-04-10 DIAGNOSIS — F31.63 BIPOLAR I DISORDER, MOST RECENT EPISODE MIXED, SEVERE WITHOUT PSYCHOTIC FEATURES (HCC): Primary | ICD-10-CM

## 2018-04-10 PROCEDURE — 90834 PSYTX W PT 45 MINUTES: CPT | Performed by: SOCIAL WORKER

## 2018-04-10 NOTE — PSYCH
Psychotherapy Provided: Individual Psychotherapy 45 minutes     Length of time in session: 45 minutes, follow up in 1 week    Goals addressed in session: Goal 1     Pain:      moderate to severe    5    Current suicide risk : Low     D - Bess stated that she continues to experience stress regarding babysitting her grandchildren and also dealing with her 's complaints and lack of participation in babysitting  Discussing ways for her to practice self care as well as to set boundaries with her   Giving supportive therapy  A- Progress - Continuing to process her emotions  P-Continue treatment    [address]: Diagnosis and Treatment Plan explained to Sandra Morales relates understanding diagnosis and is agreeable to Treatment Plan   Yes

## 2018-04-17 ENCOUNTER — OFFICE VISIT (OUTPATIENT)
Dept: BEHAVIORAL/MENTAL HEALTH CLINIC | Facility: CLINIC | Age: 58
End: 2018-04-17
Payer: MEDICARE

## 2018-04-17 DIAGNOSIS — F31.63 BIPOLAR I DISORDER, MOST RECENT EPISODE MIXED, SEVERE WITHOUT PSYCHOTIC FEATURES (HCC): Primary | ICD-10-CM

## 2018-04-17 PROCEDURE — 90834 PSYTX W PT 45 MINUTES: CPT | Performed by: SOCIAL WORKER

## 2018-04-17 NOTE — PSYCH
Psychotherapy Provided: Individual Psychotherapy 45 minutes     Length of time in session: 45 minutes, follow up in 2 week    Goals addressed in session: Goal 1     Pain:      none    0    Current suicide risk : Low     D- Bess stated that she feels that she is doing well this week  She discussed her continued frustration wit her 's behavior including constant physical complaints and lack of assistance with the grandchildren  Continuing to work on ways for her to assert herself in the relationship as well as to continuing to work on building her self confidence  A- Progress - Continuing to process her emotions  P-Continue treatment    [address]: Diagnosis and Treatment Plan explained to Delta Geovanni relates understanding diagnosis and is agreeable to Treatment Plan   Yes

## 2018-05-01 ENCOUNTER — OFFICE VISIT (OUTPATIENT)
Dept: BEHAVIORAL/MENTAL HEALTH CLINIC | Facility: CLINIC | Age: 58
End: 2018-05-01
Payer: MEDICARE

## 2018-05-01 DIAGNOSIS — F31.63 BIPOLAR I DISORDER, MOST RECENT EPISODE MIXED, SEVERE WITHOUT PSYCHOTIC FEATURES (HCC): Primary | ICD-10-CM

## 2018-05-01 PROCEDURE — 90834 PSYTX W PT 45 MINUTES: CPT | Performed by: SOCIAL WORKER

## 2018-05-01 NOTE — PSYCH
Psychotherapy Provided: Individual Psychotherapy 45 minutes     Length of time in session: 45 minutes, follow up in 1 week    Goals addressed in session: Goal 1     Pain:      moderate to severe    5    Current suicide risk : Low     D- Bess stated that she has continued to feel frustrated wit her   She shared that he continues to complain, assist very little with the grandchildren and swear in front of them while playing video games  Discussing the ongoing issues and ways to assert herself with him  Continuing to process her emotions and discuss use of healthy coping mechanisms for stress  Also discussing the importance of self care  Valdemar Markelmat stated that she has been trying to take more time for herself and read  Giving supportive therapy  A- Progress - Continuing to process her emotions  P-Continue treatment    [address]: Diagnosis and Treatment Plan explained to Melanie Frazier relates understanding diagnosis and is agreeable to Treatment Plan   Yes

## 2018-05-08 ENCOUNTER — OFFICE VISIT (OUTPATIENT)
Dept: BEHAVIORAL/MENTAL HEALTH CLINIC | Facility: CLINIC | Age: 58
End: 2018-05-08
Payer: MEDICARE

## 2018-05-08 DIAGNOSIS — F31.63 BIPOLAR I DISORDER, MOST RECENT EPISODE MIXED, SEVERE WITHOUT PSYCHOTIC FEATURES (HCC): Primary | ICD-10-CM

## 2018-05-08 PROCEDURE — 90834 PSYTX W PT 45 MINUTES: CPT | Performed by: SOCIAL WORKER

## 2018-05-16 ENCOUNTER — TRANSCRIBE ORDERS (OUTPATIENT)
Dept: ADMINISTRATIVE | Facility: HOSPITAL | Age: 58
End: 2018-05-16

## 2018-05-16 ENCOUNTER — APPOINTMENT (OUTPATIENT)
Dept: RADIOLOGY | Facility: CLINIC | Age: 58
End: 2018-05-16
Payer: MEDICARE

## 2018-05-16 DIAGNOSIS — L97.519 ULCER OF RIGHT FOOT, UNSPECIFIED ULCER STAGE (HCC): ICD-10-CM

## 2018-05-16 DIAGNOSIS — L97.521 ULCER OF TOE OF LEFT FOOT, LIMITED TO BREAKDOWN OF SKIN (HCC): ICD-10-CM

## 2018-05-16 DIAGNOSIS — L97.522 ULCER OF LEFT FOOT, WITH FAT LAYER EXPOSED (HCC): Primary | ICD-10-CM

## 2018-05-16 DIAGNOSIS — L97.522 ULCER OF LEFT FOOT, WITH FAT LAYER EXPOSED (HCC): ICD-10-CM

## 2018-05-16 PROCEDURE — 73630 X-RAY EXAM OF FOOT: CPT

## 2018-06-05 ENCOUNTER — OFFICE VISIT (OUTPATIENT)
Dept: BEHAVIORAL/MENTAL HEALTH CLINIC | Facility: CLINIC | Age: 58
End: 2018-06-05
Payer: MEDICARE

## 2018-06-05 DIAGNOSIS — F31.63 BIPOLAR I DISORDER, MOST RECENT EPISODE MIXED, SEVERE WITHOUT PSYCHOTIC FEATURES (HCC): Primary | ICD-10-CM

## 2018-06-05 PROCEDURE — 90834 PSYTX W PT 45 MINUTES: CPT | Performed by: SOCIAL WORKER

## 2018-06-05 NOTE — PSYCH
Treatment Plan Tracking    Treatment Plan not completed within required time limits due to: Client presented with emotional/behavorial issues that required clinical intervention  Brenda Martinez

## 2018-06-05 NOTE — PSYCH
Psychotherapy Provided: Individual Psychotherapy 45 minutes     Length of time in session: 45 minutes, follow up in 2 week    Goals addressed in session: Goal 1     Pain:      moderate to severe    5    Current suicide risk : Low     DChan Kellogg stated that she continues to experience stress in watching her grandchildren ad feels that her  should assist her more  She also stated that she gets annoyed with him due to his constant physical complaints which she feels are exaggerated, Processing her emotions and discussing ways for her to cope with the stress in her life  Also continuing to work on ways for her to practice self care  Giving supportive therapy  A-  Progress - Continuing to process her emotions  P-Continue treatment    [address]: Diagnosis and Treatment Plan explained to Marla Andrea relates understanding diagnosis and is agreeable to Treatment Plan   Yes

## 2018-06-21 DIAGNOSIS — I10 ESSENTIAL HYPERTENSION: Primary | ICD-10-CM

## 2018-06-21 RX ORDER — ATENOLOL AND CHLORTHALIDONE TABLET 50; 25 MG/1; MG/1
0.5 TABLET ORAL DAILY
Qty: 90 TABLET | Refills: 1 | Status: SHIPPED | OUTPATIENT
Start: 2018-06-21 | End: 2018-07-09 | Stop reason: HOSPADM

## 2018-06-28 ENCOUNTER — APPOINTMENT (EMERGENCY)
Dept: RADIOLOGY | Facility: HOSPITAL | Age: 58
End: 2018-06-28
Payer: MEDICARE

## 2018-06-28 ENCOUNTER — HOSPITAL ENCOUNTER (EMERGENCY)
Facility: HOSPITAL | Age: 58
Discharge: HOME | End: 2018-06-28
Attending: EMERGENCY MEDICINE | Admitting: EMERGENCY MEDICINE
Payer: MEDICARE

## 2018-06-28 VITALS
WEIGHT: 220 LBS | SYSTOLIC BLOOD PRESSURE: 132 MMHG | HEIGHT: 65 IN | HEART RATE: 82 BPM | RESPIRATION RATE: 20 BRPM | OXYGEN SATURATION: 95 % | TEMPERATURE: 99.2 F | BODY MASS INDEX: 36.65 KG/M2 | DIASTOLIC BLOOD PRESSURE: 75 MMHG

## 2018-06-28 DIAGNOSIS — L97.519: Primary | ICD-10-CM

## 2018-06-28 DIAGNOSIS — L03.115 CELLULITIS OF RIGHT FOOT: ICD-10-CM

## 2018-06-28 LAB
ALBUMIN SERPL BCP-MCNC: 3.9 G/DL (ref 3.5–5)
ALP SERPL-CCNC: 106 U/L (ref 46–116)
ALT SERPL W P-5'-P-CCNC: 26 U/L (ref 12–78)
ANION GAP SERPL CALCULATED.3IONS-SCNC: 11 MMOL/L (ref 4–13)
AST SERPL W P-5'-P-CCNC: 18 U/L (ref 5–45)
BASOPHILS # BLD AUTO: 0.04 THOUSANDS/ΜL (ref 0–0.1)
BASOPHILS NFR BLD AUTO: 1 % (ref 0–1)
BILIRUB SERPL-MCNC: 0.3 MG/DL (ref 0.2–1)
BUN SERPL-MCNC: 15 MG/DL (ref 5–25)
CALCIUM SERPL-MCNC: 8.9 MG/DL (ref 8.3–10.1)
CHLORIDE SERPL-SCNC: 99 MMOL/L (ref 100–108)
CO2 SERPL-SCNC: 26 MMOL/L (ref 21–32)
CREAT SERPL-MCNC: 0.9 MG/DL (ref 0.6–1.3)
EOSINOPHIL # BLD AUTO: 0.23 THOUSAND/ΜL (ref 0–0.61)
EOSINOPHIL NFR BLD AUTO: 3 % (ref 0–6)
ERYTHROCYTE [DISTWIDTH] IN BLOOD BY AUTOMATED COUNT: 11.8 % (ref 11.6–15.1)
GFR SERPL CREATININE-BSD FRML MDRD: 71 ML/MIN/1.73SQ M
GLUCOSE SERPL-MCNC: 107 MG/DL (ref 65–140)
HCT VFR BLD AUTO: 43.8 % (ref 34.8–46.1)
HGB BLD-MCNC: 15.2 G/DL (ref 11.5–15.4)
IMM GRANULOCYTES # BLD AUTO: 0.02 THOUSAND/UL (ref 0–0.2)
IMM GRANULOCYTES NFR BLD AUTO: 0 % (ref 0–2)
LYMPHOCYTES # BLD AUTO: 1.19 THOUSANDS/ΜL (ref 0.6–4.47)
LYMPHOCYTES NFR BLD AUTO: 15 % (ref 14–44)
MCH RBC QN AUTO: 33.1 PG (ref 26.8–34.3)
MCHC RBC AUTO-ENTMCNC: 34.7 G/DL (ref 31.4–37.4)
MCV RBC AUTO: 95 FL (ref 82–98)
MONOCYTES # BLD AUTO: 0.75 THOUSAND/ΜL (ref 0.17–1.22)
MONOCYTES NFR BLD AUTO: 9 % (ref 4–12)
NEUTROPHILS # BLD AUTO: 5.87 THOUSANDS/ΜL (ref 1.85–7.62)
NEUTS SEG NFR BLD AUTO: 72 % (ref 43–75)
NRBC BLD AUTO-RTO: 0 /100 WBCS
PLATELET # BLD AUTO: 252 THOUSANDS/UL (ref 149–390)
PMV BLD AUTO: 8.7 FL (ref 8.9–12.7)
POTASSIUM SERPL-SCNC: 3.9 MMOL/L (ref 3.5–5.3)
PROT SERPL-MCNC: 7.9 G/DL (ref 6.4–8.2)
RBC # BLD AUTO: 4.59 MILLION/UL (ref 3.81–5.12)
SODIUM SERPL-SCNC: 136 MMOL/L (ref 136–145)
WBC # BLD AUTO: 8.1 THOUSAND/UL (ref 4.31–10.16)

## 2018-06-28 PROCEDURE — 96365 THER/PROPH/DIAG IV INF INIT: CPT

## 2018-06-28 PROCEDURE — 36415 COLL VENOUS BLD VENIPUNCTURE: CPT | Performed by: PHYSICIAN ASSISTANT

## 2018-06-28 PROCEDURE — 80053 COMPREHEN METABOLIC PANEL: CPT | Performed by: PHYSICIAN ASSISTANT

## 2018-06-28 PROCEDURE — 73630 X-RAY EXAM OF FOOT: CPT

## 2018-06-28 PROCEDURE — 85025 COMPLETE CBC W/AUTO DIFF WBC: CPT | Performed by: PHYSICIAN ASSISTANT

## 2018-06-28 PROCEDURE — 99284 EMERGENCY DEPT VISIT MOD MDM: CPT

## 2018-06-28 RX ORDER — CEPHALEXIN 500 MG/1
500 CAPSULE ORAL 4 TIMES DAILY
Qty: 28 CAPSULE | Refills: 0 | Status: SHIPPED | OUTPATIENT
Start: 2018-06-28 | End: 2018-07-09 | Stop reason: HOSPADM

## 2018-06-28 RX ORDER — SULFAMETHOXAZOLE AND TRIMETHOPRIM 800; 160 MG/1; MG/1
1 TABLET ORAL 2 TIMES DAILY
Qty: 20 TABLET | Refills: 0 | Status: SHIPPED | OUTPATIENT
Start: 2018-06-28 | End: 2018-07-09 | Stop reason: HOSPADM

## 2018-06-28 RX ADMIN — CEFAZOLIN SODIUM 2000 MG: 2 SOLUTION INTRAVENOUS at 11:28

## 2018-06-28 NOTE — ED PROVIDER NOTES
History  Chief Complaint   Patient presents with    Foot Swelling     Patient presents to the Er stating she noted her right foot was getting reddened and swollen this am, patient charcot foot on right side  Patient presents to the ED with right foot redness and swelling that she just noticed this morning  Patient has charcot foot and neuropathy  She states she used to have diabetes, but no longer is on medication for it  She denies fevers/chills/nausea/vomiting  She did see Dr Tyler Nathan, her podiatrist, 1 month ago  SHe states she tried to call him today to make an appointment, but unable to get an appointment so was told to go to the ED  Patient does have an ulcer to bottom of right 1st toe  SHe is not sure how long it has been there, but states it wasn't there 1 month ago when she saw the podiatrist           History provided by:  Patient  Rash   Location:  Foot  Foot rash location:  R foot  Quality: redness    Quality: not blistering, not bruising, not draining and not dry    Severity:  Moderate  Onset quality:  Sudden  Duration:  1 day  Timing:  Constant  Progression:  Unchanged  Chronicity:  New  Relieved by:  Nothing  Worsened by:  Nothing  Ineffective treatments:  None tried  Associated symptoms: no abdominal pain, no diarrhea, no fatigue, no fever, no nausea, no shortness of breath, no sore throat, no throat swelling, no tongue swelling and no URI        Prior to Admission Medications   Prescriptions Last Dose Informant Patient Reported? Taking?    Omega-3 Fatty Acids (FISH OIL) 1,000 mg  Self Yes No   Sig: Take 1 capsule by mouth 2 (two) times a day   atenolol-chlorthalidone (TENORETIC) 50-25 mg per tablet   No No   Sig: Take 0 5 tablets by mouth daily   buPROPion (WELLBUTRIN XL) 300 mg 24 hr tablet  Self Yes No   Sig: Take 1 tablet by mouth daily   carBAMazepine (EPITOL) 200 mg tablet  Self Yes No   Sig: Take 3 tablets by mouth   risperiDONE (RisperDAL) 1 mg tablet  Self Yes No   sertraline (ZOLOFT) 100 mg tablet  Self Yes No   Sig: Take 1 tablet by mouth daily   simvastatin (ZOCOR) 20 mg tablet   No No   Sig: Take 1 tablet (20 mg total) by mouth daily   ziprasidone (GEODON) 60 mg capsule  Self Yes No   Sig: Take by mouth      Facility-Administered Medications: None       Past Medical History:   Diagnosis Date    Anxiety     Asthmatic bronchitis with exacerbation     Depression     Diabetes mellitus ([facility] [address] )     Hyperlipidemia     Hypertension     Other headache syndrome        Past Surgical History:   Procedure Laterality Date    KNEE SURGERY      TONSILLECTOMY         Family History   Problem Relation Age of Onset    Heart disease Father     Prostate cancer Father     Heart attack Maternal Grandmother     Colon cancer Maternal Grandfather     Breast cancer Paternal Grandmother     Heart attack Paternal Grandfather     Coronary artery disease Family     Diabetes Family      I have reviewed and agree with the history as documented  Social History   Substance Use Topics    Smoking status: Current Every Day Smoker    Smokeless tobacco: Never Used    Alcohol use No      Comment: (history)        Review of Systems   Constitutional: Negative for chills, fatigue and fever  HENT: Negative for sore throat  Respiratory: Negative for cough and shortness of breath  Cardiovascular: Negative for chest pain  Gastrointestinal: Negative for abdominal pain, diarrhea and nausea  Skin: Positive for color change, rash and wound  Psychiatric/Behavioral: Negative for confusion  All other systems reviewed and are negative  Physical Exam  Physical Exam   Constitutional: She is oriented to person, place, and time  She appears well-developed and well-nourished  She is active and cooperative  She does not appear ill  No distress  HENT:   Head: Normocephalic and atraumatic     Right Ear: Hearing normal    Left Ear: Hearing normal    Nose: Nose normal    Mouth/Throat: Oropharynx is clear and moist and mucous membranes are normal    Eyes: Conjunctivae and lids are normal    Neck: Normal range of motion  Cardiovascular: Normal rate, regular rhythm and normal heart sounds  No murmur heard  Pulses:       Dorsalis pedis pulses are 2+ on the right side  Pulmonary/Chest: Effort normal and breath sounds normal  She has no wheezes  She has no rhonchi  She has no rales  Musculoskeletal:        Right foot: There is swelling  There is no tenderness, no bony tenderness and no deformity  Feet:    Neurological: She is alert and oriented to person, place, and time  She has normal strength  No sensory deficit  Skin: Skin is warm and dry  No rash noted  She is not diaphoretic  There is erythema  No pallor  Psychiatric: She has a normal mood and affect  Nursing note and vitals reviewed        Vital Signs  ED Triage Vitals [06/28/18 1110]   Temperature Pulse Respirations Blood Pressure SpO2   99 2 °F (37 3 °C) 74 20 163/83 93 %      Temp Source Heart Rate Source Patient Position - Orthostatic VS BP Location FiO2 (%)   Tympanic Monitor Lying Right arm --      Pain Score       --           Vitals:    06/28/18 1145 06/28/18 1200 06/28/18 1215 06/28/18 1230   BP: 120/65 116/67  132/75   Pulse: 71 69 72 82   Patient Position - Orthostatic VS:           Visual Acuity      ED Medications  Medications   ceFAZolin (ANCEF) IVPB (premix) 2,000 mg (0 mg Intravenous Stopped 6/28/18 1158)       Diagnostic Studies  Results Reviewed     Procedure Component Value Units Date/Time    Comprehensive metabolic panel [[id number]]  (Abnormal) Collected:  06/28/18 1130    Lab Status:  Final result Specimen:  Blood from Arm, Left Updated:  06/28/18 1217     Sodium 136 mmol/L      Potassium 3 9 mmol/L      Chloride 99 (L) mmol/L      CO2 26 mmol/L      Anion Gap 11 mmol/L      BUN 15 mg/dL      Creatinine 0 90 mg/dL      Glucose 107 mg/dL      Calcium 8 9 mg/dL      AST 18 U/L      ALT 26 U/L      Alkaline Phosphatase 106 U/L Total Protein 7 9 g/dL      Albumin 3 9 g/dL      Total Bilirubin 0 30 mg/dL      eGFR 71 ml/min/1 73sq m     Narrative:         National Kidney Disease Education Program recommendations are as follows:  GFR calculation is accurate only with a steady state creatinine  Chronic Kidney disease less than 60 ml/min/1 73 sq  meters  Kidney failure less than 15 ml/min/1 73 sq  meters  CBC and differential [[id number]]  (Abnormal) Collected:  06/28/18 1130    Lab Status:  Final result Specimen:  Blood from Arm, Left Updated:  06/28/18 1202     WBC 8 10 Thousand/uL      RBC 4 59 Million/uL      Hemoglobin 15 2 g/dL      Hematocrit 43 8 %      MCV 95 fL      MCH 33 1 pg      MCHC 34 7 g/dL      RDW 11 8 %      MPV 8 7 (L) fL      Platelets 735 Thousands/uL      nRBC 0 /100 WBCs      Neutrophils Relative 72 %      Immat GRANS % 0 %      Lymphocytes Relative 15 %      Monocytes Relative 9 %      Eosinophils Relative 3 %      Basophils Relative 1 %      Neutrophils Absolute 5 87 Thousands/µL      Immature Grans Absolute 0 02 Thousand/uL      Lymphocytes Absolute 1 19 Thousands/µL      Monocytes Absolute 0 75 Thousand/µL      Eosinophils Absolute 0 23 Thousand/µL      Basophils Absolute 0 04 Thousands/µL                  XR foot 3+ views RIGHT   ED Interpretation by Pedro Novoa PA-C (06/28 1232)   No signs of osteo  No acute fx         by Taniya Pierre MD (06/28 1233)                 Procedures  Procedures       Phone Contacts  ED Phone Contact    ED Course                               MDM  Number of Diagnoses or Management Options  Cellulitis of right foot: new and requires workup  Skin ulcer of right great toe [facility]): new and requires workup  Diagnosis management comments: Patient with cellulitis right foot, no fever, WBC normal, will start on oral antibiotics  Patient instructed to return to ER if redness is spreading or fevers          Amount and/or Complexity of Data Reviewed  Clinical lab tests: reviewed and ordered  Tests in the radiology section of CPT®: ordered and reviewed  Independent visualization of images, tracings, or specimens: yes      CritCare Time    Disposition  Final diagnoses:   Skin ulcer of right great toe ([address] )   Cellulitis of right foot     Time reflects when diagnosis was documented in both MDM as applicable and the Disposition within this note     Time User Action Codes Description Comment    6/28/2018 12:28 PM Ronn Mcdanielks Add [D57 867] Skin ulcer of right great toe ([address] )     6/28/2018 12:28 PM Ronn Gotti Add [L03 [id number]] Cellulitis of right foot       ED Disposition     ED Disposition Condition Comment    Discharge  Saundra Green Juancho discharge to home/self care  Condition at discharge: Stable        Follow-up Information     Follow up With Specialties Details Why Contact Info    Omari Rosales DPM Podiatry Call in 2 days For recheck [address]   [facility] [address] [facility]  [phone]            Discharge Medication List as of 6/28/2018 12:29 PM      START taking these medications    Details   cephalexin (KEFLEX) 500 mg capsule Take 1 capsule (500 mg total) by mouth 4 (four) times a day for 7 days, Starting u 6/28/2018, Until Thu 7/5/2018, Normal      sulfamethoxazole-trimethoprim (BACTRIM DS) 800-160 mg per tablet Take 1 tablet by mouth 2 (two) times a day for 10 days smx-tmp DS (BACTRIM) 800-160 mg tabs (1tab q12 D10), Starting Thu 6/28/2018, Until Sun 7/8/2018, Normal         CONTINUE these medications which have NOT CHANGED    Details   atenolol-chlorthalidone (TENORETIC) 50-25 mg per tablet Take 0 5 tablets by mouth daily, Starting u 6/21/2018, Normal      buPROPion (WELLBUTRIN XL) 300 mg 24 hr tablet Take 1 tablet by mouth daily, Starting u 8/25/2011, Historical Med      carBAMazepine (EPITOL) 200 mg tablet Take 3 tablets by mouth, Starting u 8/25/2011, Historical Med      Omega-3 Fatty Acids (FISH OIL) 1,000 mg Take 1 capsule by mouth 2 (two) times a day, Historical Med      risperiDONE (RisperDAL) 1 mg tablet Starting Mon 1/1/2018, Historical Med      sertraline (ZOLOFT) 100 mg tablet Take 1 tablet by mouth daily, Starting Tue 10/8/2013, Historical Med      simvastatin (ZOCOR) 20 mg tablet Take 1 tablet (20 mg total) by mouth daily, Starting Mon 1/29/2018, Normal      ziprasidone (GEODON) 60 mg capsule Take by mouth, Starting Thu 12/26/2013, Historical Med           No discharge procedures on file      ED Provider  Electronically Signed by           Mahamed Turner PA-C  06/28/18 1231

## 2018-06-28 NOTE — DISCHARGE INSTRUCTIONS
Rest, elevate foot  Warm soaks to foot  Take antibiotics as directed  Follow up with podiatrist for recheck in 2-3 days  Return to ER if redness spreading or fevers  Cellulitis   WHAT YOU NEED TO KNOW:   Cellulitis is a skin infection caused by bacteria  Cellulitis may go away on its own or you may need treatment  Your healthcare provider may draw a [place] around the outside edges of your cellulitis  If your cellulitis spreads, your healthcare provider will see it outside of the [place]  DISCHARGE INSTRUCTIONS:   Call 911 if:   · You have sudden trouble breathing or chest pain  Return to the emergency department if:   · Your wound gets larger and more painful  · You feel a crackling under your skin when you touch it  · You have purple dots or bumps on your skin, or you see bleeding under your skin  · You have new swelling and pain in your legs  · The red, warm, swollen area gets larger  · You see red streaks coming from the infected area  Contact your healthcare provider if:   · You have a fever  · Your fever or pain does not go away or gets worse  · The area does not get smaller after 2 days of antibiotics  · Your skin is flaking or peeling off  · You have questions or concerns about your condition or care  Medicines:   · Antibiotics  help treat the bacterial infection  · NSAIDs , such as ibuprofen, help decrease swelling, pain, and fever  NSAIDs can cause stomach bleeding or kidney problems in certain people  If you take blood thinner medicine, always ask if NSAIDs are safe for you  Always read the medicine label and follow directions  Do not give these medicines to children under 10months of age without direction from your child's healthcare provider  · Acetaminophen  decreases pain and fever  It is available without a doctor's order  Ask how much to take and how often to take it  Follow directions   Read the labels of all other medicines you are using to see if they also contain acetaminophen, or ask your doctor or pharmacist  Acetaminophen can cause liver damage if not taken correctly  Do not use more than 4 grams (4,000 milligrams) total of acetaminophen in one day  · Take your medicine as directed  Contact your healthcare provider if you think your medicine is not helping or if you have side effects  Tell him or her if you are allergic to any medicine  Keep a list of the medicines, vitamins, and herbs you take  Include the amounts, and when and why you take them  Bring the list or the pill bottles to follow-up visits  Carry your medicine list with you in case of an emergency  Self-care:   · Elevate the area above the level of your heart  as often as you can  This will help decrease swelling and pain  Prop the area on pillows or blankets to keep it elevated comfortably  · Clean the area daily until the wound scabs over  Gently wash the area with soap and water  Pat dry  Use dressings as directed  · Place cool or warm, wet cloths on the area as directed  Use clean cloths and clean water  Leave it on the area until the cloth is room temperature  Pat the area dry with a clean, dry cloth  The cloths may help decrease pain  Prevent cellulitis:   · Do not scratch bug bites or areas of injury  You increase your risk for cellulitis by scratching these areas  · Do not share personal items, such as towels, clothing, and razors  · Clean exercise equipment  with germ-killing  before and after you use it  · Wash your hands often  Use soap and water  Wash your hands after you use the bathroom, change a child's diapers, or sneeze  Wash your hands before you prepare or eat food  Use lotion to prevent dry, cracked skin  · Wear pressure stockings as directed  You may be told to wear the stockings if you have peripheral edema  The stockings improve blood flow and decrease swelling  · Treat athlete's foot    This can help prevent the spread of a bacterial skin infection  Follow up with your healthcare provider within 3 days, or as directed: Your healthcare provider will check if your cellulitis is getting better  You may need different medicine  Write down your questions so you remember to ask them during your visits  © 2017 [address] Information is for End User's use only and may not be sold, redistributed or otherwise used for commercial purposes  All illustrations and images included in CareNotes® are the copyrighted property of [employer] , [employer]  or Rakesh Sánchez  The above information is an  only  It is not intended as medical advice for individual conditions or treatments  Talk to your doctor, nurse or pharmacist before following any medical regimen to see if it is safe and effective for you

## 2018-07-02 ENCOUNTER — HOSPITAL ENCOUNTER (INPATIENT)
Facility: HOSPITAL | Age: 58
LOS: 7 days | Discharge: HOME HEALTH | DRG: 854 | End: 2018-07-09
Attending: EMERGENCY MEDICINE | Admitting: INTERNAL MEDICINE
Payer: MEDICARE

## 2018-07-02 ENCOUNTER — APPOINTMENT (EMERGENCY)
Dept: RADIOLOGY | Facility: HOSPITAL | Age: 58
DRG: 854 | End: 2018-07-02
Payer: MEDICARE

## 2018-07-02 DIAGNOSIS — Z72.0 TOBACCO USE: ICD-10-CM

## 2018-07-02 DIAGNOSIS — L03.115 CELLULITIS OF RIGHT LOWER EXTREMITY: ICD-10-CM

## 2018-07-02 DIAGNOSIS — L03.115 CELLULITIS OF RIGHT FOOT: ICD-10-CM

## 2018-07-02 DIAGNOSIS — L08.9 DIABETIC INFECTION OF RIGHT FOOT (HCC): Primary | ICD-10-CM

## 2018-07-02 DIAGNOSIS — I10 ESSENTIAL HYPERTENSION: ICD-10-CM

## 2018-07-02 DIAGNOSIS — Z78.9 FAILURE OF OUTPATIENT TREATMENT: ICD-10-CM

## 2018-07-02 DIAGNOSIS — E11.628 DIABETIC INFECTION OF RIGHT FOOT (HCC): Primary | ICD-10-CM

## 2018-07-02 PROBLEM — E11.40 TYPE 2 DIABETES MELLITUS WITH DIABETIC NEUROPATHY, WITHOUT LONG-TERM CURRENT USE OF INSULIN (HCC): Status: ACTIVE | Noted: 2018-07-02

## 2018-07-02 PROBLEM — A41.9 SEPSIS (HCC): Status: ACTIVE | Noted: 2018-07-02

## 2018-07-02 LAB
ALBUMIN SERPL BCP-MCNC: 3.3 G/DL (ref 3.5–5)
ALP SERPL-CCNC: 145 U/L (ref 46–116)
ALT SERPL W P-5'-P-CCNC: 53 U/L (ref 12–78)
ANION GAP SERPL CALCULATED.3IONS-SCNC: 10 MMOL/L (ref 4–13)
APTT PPP: 33 SECONDS (ref 24–36)
AST SERPL W P-5'-P-CCNC: 46 U/L (ref 5–45)
BASOPHILS # BLD AUTO: 0.04 THOUSANDS/ΜL (ref 0–0.1)
BASOPHILS NFR BLD AUTO: 1 % (ref 0–1)
BILIRUB SERPL-MCNC: 0.5 MG/DL (ref 0.2–1)
BUN SERPL-MCNC: 12 MG/DL (ref 5–25)
CALCIUM SERPL-MCNC: 8.9 MG/DL (ref 8.3–10.1)
CHLORIDE SERPL-SCNC: 92 MMOL/L (ref 100–108)
CO2 SERPL-SCNC: 29 MMOL/L (ref 21–32)
CREAT SERPL-MCNC: 0.85 MG/DL (ref 0.6–1.3)
EOSINOPHIL # BLD AUTO: 0.13 THOUSAND/ΜL (ref 0–0.61)
EOSINOPHIL NFR BLD AUTO: 2 % (ref 0–6)
ERYTHROCYTE [DISTWIDTH] IN BLOOD BY AUTOMATED COUNT: 11.7 % (ref 11.6–15.1)
GFR SERPL CREATININE-BSD FRML MDRD: 76 ML/MIN/1.73SQ M
GLUCOSE SERPL-MCNC: 122 MG/DL (ref 65–140)
GLUCOSE SERPL-MCNC: 123 MG/DL (ref 65–140)
GLUCOSE SERPL-MCNC: 133 MG/DL (ref 65–140)
GLUCOSE SERPL-MCNC: 149 MG/DL (ref 65–140)
GLUCOSE SERPL-MCNC: 168 MG/DL (ref 65–140)
HCT VFR BLD AUTO: 40.1 % (ref 34.8–46.1)
HGB BLD-MCNC: 14.2 G/DL (ref 11.5–15.4)
IMM GRANULOCYTES # BLD AUTO: 0.03 THOUSAND/UL (ref 0–0.2)
IMM GRANULOCYTES NFR BLD AUTO: 0 % (ref 0–2)
INR PPP: 1.18 (ref 0.86–1.17)
LACTATE SERPL-SCNC: 1.4 MMOL/L (ref 0.5–2)
LYMPHOCYTES # BLD AUTO: 0.57 THOUSANDS/ΜL (ref 0.6–4.47)
LYMPHOCYTES NFR BLD AUTO: 7 % (ref 14–44)
MCH RBC QN AUTO: 33.7 PG (ref 26.8–34.3)
MCHC RBC AUTO-ENTMCNC: 35.4 G/DL (ref 31.4–37.4)
MCV RBC AUTO: 95 FL (ref 82–98)
MONOCYTES # BLD AUTO: 0.63 THOUSAND/ΜL (ref 0.17–1.22)
MONOCYTES NFR BLD AUTO: 7 % (ref 4–12)
NEUTROPHILS # BLD AUTO: 7.17 THOUSANDS/ΜL (ref 1.85–7.62)
NEUTS SEG NFR BLD AUTO: 83 % (ref 43–75)
NRBC BLD AUTO-RTO: 0 /100 WBCS
PLATELET # BLD AUTO: 246 THOUSANDS/UL (ref 149–390)
PMV BLD AUTO: 8.8 FL (ref 8.9–12.7)
POTASSIUM SERPL-SCNC: 4.3 MMOL/L (ref 3.5–5.3)
PROT SERPL-MCNC: 7.8 G/DL (ref 6.4–8.2)
PROTHROMBIN TIME: 14.3 SECONDS (ref 11.8–14.2)
RBC # BLD AUTO: 4.21 MILLION/UL (ref 3.81–5.12)
SODIUM SERPL-SCNC: 131 MMOL/L (ref 136–145)
WBC # BLD AUTO: 8.57 THOUSAND/UL (ref 4.31–10.16)

## 2018-07-02 PROCEDURE — 73630 X-RAY EXAM OF FOOT: CPT

## 2018-07-02 PROCEDURE — 99223 1ST HOSP IP/OBS HIGH 75: CPT | Performed by: INTERNAL MEDICINE

## 2018-07-02 PROCEDURE — 87205 SMEAR GRAM STAIN: CPT | Performed by: EMERGENCY MEDICINE

## 2018-07-02 PROCEDURE — 87186 SC STD MICRODIL/AGAR DIL: CPT | Performed by: EMERGENCY MEDICINE

## 2018-07-02 PROCEDURE — 83605 ASSAY OF LACTIC ACID: CPT | Performed by: EMERGENCY MEDICINE

## 2018-07-02 PROCEDURE — 85610 PROTHROMBIN TIME: CPT | Performed by: EMERGENCY MEDICINE

## 2018-07-02 PROCEDURE — 82948 REAGENT STRIP/BLOOD GLUCOSE: CPT

## 2018-07-02 PROCEDURE — 87186 SC STD MICRODIL/AGAR DIL: CPT | Performed by: PODIATRIST

## 2018-07-02 PROCEDURE — 87070 CULTURE OTHR SPECIMN AEROBIC: CPT | Performed by: EMERGENCY MEDICINE

## 2018-07-02 PROCEDURE — 85025 COMPLETE CBC W/AUTO DIFF WBC: CPT | Performed by: EMERGENCY MEDICINE

## 2018-07-02 PROCEDURE — 87205 SMEAR GRAM STAIN: CPT | Performed by: PODIATRIST

## 2018-07-02 PROCEDURE — 99284 EMERGENCY DEPT VISIT MOD MDM: CPT

## 2018-07-02 PROCEDURE — 87070 CULTURE OTHR SPECIMN AEROBIC: CPT | Performed by: PODIATRIST

## 2018-07-02 PROCEDURE — 36415 COLL VENOUS BLD VENIPUNCTURE: CPT | Performed by: EMERGENCY MEDICINE

## 2018-07-02 PROCEDURE — 96365 THER/PROPH/DIAG IV INF INIT: CPT

## 2018-07-02 PROCEDURE — 87147 CULTURE TYPE IMMUNOLOGIC: CPT | Performed by: PODIATRIST

## 2018-07-02 PROCEDURE — 80053 COMPREHEN METABOLIC PANEL: CPT | Performed by: EMERGENCY MEDICINE

## 2018-07-02 PROCEDURE — 85730 THROMBOPLASTIN TIME PARTIAL: CPT | Performed by: EMERGENCY MEDICINE

## 2018-07-02 PROCEDURE — 87147 CULTURE TYPE IMMUNOLOGIC: CPT | Performed by: EMERGENCY MEDICINE

## 2018-07-02 PROCEDURE — 87040 BLOOD CULTURE FOR BACTERIA: CPT | Performed by: EMERGENCY MEDICINE

## 2018-07-02 RX ORDER — ZIPRASIDONE HYDROCHLORIDE 20 MG/1
60 CAPSULE ORAL
Status: DISCONTINUED | OUTPATIENT
Start: 2018-07-03 | End: 2018-07-09 | Stop reason: HOSPADM

## 2018-07-02 RX ORDER — SERTRALINE HYDROCHLORIDE 100 MG/1
100 TABLET, FILM COATED ORAL DAILY
Status: DISCONTINUED | OUTPATIENT
Start: 2018-07-02 | End: 2018-07-09 | Stop reason: HOSPADM

## 2018-07-02 RX ORDER — ACETAMINOPHEN 325 MG/1
650 TABLET ORAL EVERY 6 HOURS PRN
Status: DISCONTINUED | OUTPATIENT
Start: 2018-07-02 | End: 2018-07-09 | Stop reason: HOSPADM

## 2018-07-02 RX ORDER — PRAVASTATIN SODIUM 20 MG
20 TABLET ORAL
Status: DISCONTINUED | OUTPATIENT
Start: 2018-07-02 | End: 2018-07-09 | Stop reason: HOSPADM

## 2018-07-02 RX ORDER — RISPERIDONE 1 MG/1
1 TABLET, FILM COATED ORAL
Status: DISCONTINUED | OUTPATIENT
Start: 2018-07-03 | End: 2018-07-09 | Stop reason: HOSPADM

## 2018-07-02 RX ORDER — ONDANSETRON 2 MG/ML
4 INJECTION INTRAMUSCULAR; INTRAVENOUS EVERY 6 HOURS PRN
Status: DISCONTINUED | OUTPATIENT
Start: 2018-07-02 | End: 2018-07-09 | Stop reason: HOSPADM

## 2018-07-02 RX ORDER — ZIPRASIDONE HYDROCHLORIDE 20 MG/1
60 CAPSULE ORAL
Status: DISCONTINUED | OUTPATIENT
Start: 2018-07-02 | End: 2018-07-02

## 2018-07-02 RX ORDER — ATENOLOL 50 MG/1
50 TABLET ORAL DAILY
Status: DISCONTINUED | OUTPATIENT
Start: 2018-07-02 | End: 2018-07-09 | Stop reason: HOSPADM

## 2018-07-02 RX ORDER — ACETAMINOPHEN 325 MG/1
975 TABLET ORAL ONCE
Status: COMPLETED | OUTPATIENT
Start: 2018-07-02 | End: 2018-07-02

## 2018-07-02 RX ORDER — HEPARIN SODIUM 5000 [USP'U]/ML
5000 INJECTION, SOLUTION INTRAVENOUS; SUBCUTANEOUS EVERY 8 HOURS SCHEDULED
Status: DISCONTINUED | OUTPATIENT
Start: 2018-07-02 | End: 2018-07-09 | Stop reason: HOSPADM

## 2018-07-02 RX ORDER — RISPERIDONE 1 MG/1
1 TABLET, FILM COATED ORAL
Status: DISCONTINUED | OUTPATIENT
Start: 2018-07-02 | End: 2018-07-02

## 2018-07-02 RX ORDER — CARBAMAZEPINE 200 MG/1
600 TABLET ORAL 3 TIMES DAILY
Status: DISCONTINUED | OUTPATIENT
Start: 2018-07-02 | End: 2018-07-02

## 2018-07-02 RX ORDER — CARBAMAZEPINE 200 MG/1
600 TABLET ORAL
Status: DISCONTINUED | OUTPATIENT
Start: 2018-07-02 | End: 2018-07-02

## 2018-07-02 RX ORDER — BUPROPION HYDROCHLORIDE 300 MG/1
300 TABLET ORAL DAILY
Status: DISCONTINUED | OUTPATIENT
Start: 2018-07-02 | End: 2018-07-09 | Stop reason: HOSPADM

## 2018-07-02 RX ORDER — CARBAMAZEPINE 200 MG/1
600 TABLET ORAL
Status: DISCONTINUED | OUTPATIENT
Start: 2018-07-03 | End: 2018-07-09 | Stop reason: HOSPADM

## 2018-07-02 RX ADMIN — VANCOMYCIN HYDROCHLORIDE 1500 MG: 1 INJECTION, POWDER, LYOPHILIZED, FOR SOLUTION INTRAVENOUS at 08:11

## 2018-07-02 RX ADMIN — ZIPRASIDONE HCL 60 MG: 20 CAPSULE ORAL at 21:31

## 2018-07-02 RX ADMIN — HEPARIN SODIUM 5000 UNITS: 5000 INJECTION, SOLUTION INTRAVENOUS; SUBCUTANEOUS at 21:32

## 2018-07-02 RX ADMIN — RISPERIDONE 1 MG: 1 TABLET ORAL at 21:31

## 2018-07-02 RX ADMIN — VANCOMYCIN HYDROCHLORIDE 1500 MG: 1 INJECTION, POWDER, LYOPHILIZED, FOR SOLUTION INTRAVENOUS at 22:40

## 2018-07-02 RX ADMIN — HEPARIN SODIUM 5000 UNITS: 5000 INJECTION, SOLUTION INTRAVENOUS; SUBCUTANEOUS at 13:55

## 2018-07-02 RX ADMIN — INSULIN LISPRO 1 UNITS: 100 INJECTION, SOLUTION INTRAVENOUS; SUBCUTANEOUS at 14:00

## 2018-07-02 RX ADMIN — SODIUM CHLORIDE 1000 ML: 0.9 INJECTION, SOLUTION INTRAVENOUS at 08:02

## 2018-07-02 RX ADMIN — CEFEPIME HYDROCHLORIDE 1000 MG: 1 INJECTION, SOLUTION INTRAVENOUS at 21:35

## 2018-07-02 RX ADMIN — CARBAMAZEPINE 600 MG: 200 TABLET ORAL at 16:51

## 2018-07-02 RX ADMIN — ACETAMINOPHEN 975 MG: 325 TABLET, FILM COATED ORAL at 08:03

## 2018-07-02 RX ADMIN — CEFEPIME HYDROCHLORIDE 2000 MG: 2 INJECTION, SOLUTION INTRAVENOUS at 09:20

## 2018-07-02 NOTE — PHYSICAL THERAPY NOTE
Physical Therapy Cancellation Note    Patient with R foot wound  Awaiting Podiatry consult for weight bearing orders  Will continue to follow  Adalgisa Forrest, PT

## 2018-07-02 NOTE — ASSESSMENT & PLAN NOTE
She meets criteria for sepsis with temperature of more than 101, heart of more than 90 and respiratory 20 she does not meet criteria for severe sepsis

## 2018-07-02 NOTE — SEPSIS NOTE
Sepsis Note   Tammy Eugene 62 y o  female MRN: [id number]  Unit/Bed#: [address] [place] [address] Encounter: [id number]            Initial Sepsis Screening     Row Name 07/02/18 1104 07/02/18 0727             Is the patient's history suggestive of a new or worsening infection?  (!)  Yes (Proceed)  -VL       Suspected source of infection   wound infection  -VL       Are two or more of the following signs & symptoms of infection both present and new to the patient?  (!)  Yes (Proceed)  -VL       Indicate SIRS criteria   Hyperthemia > 38 3C (100 9F); Tachycardia > 90 bpm  -VL       If the answer is yes to both questions, suspicion of sepsis is present  [de-identified]         If severe sepsis is present AND tissue hypoperfusion perists in the hour after fluid resuscitation or lactate > 4, the patient meets criteria for SEPTIC SHOCK           Are any of the following organ dysfunction criteria present within 6 hours of suspected infection and SIRS criteria that are NOT considered to be chronic conditions? (!)  Yes  -CB         Organ dysfunction           Date of presentation of severe sepsis           Time of presentation of severe sepsis           Tissue hypoperfusion persists in the hour after crystalloid fluid administration, evidenced, by either:           Was hypotension present within one hour of the conclusion of crystalloid fluid administration?  No  -CB         Date of presentation of septic shock           Time of presentation of septic shock             User Key  (r) = Recorded By, (t) = Taken By, (c) = Cosigned By    [address] Name Provider Kendra Mejia MD Physician    ALYSA Light DO Physician

## 2018-07-02 NOTE — ASSESSMENT & PLAN NOTE
Patient has significant cellulitis of the right dorsum of the foot and she failed outpatient therapy with Keflex with Bactrim  This is in the setting of diabetic neuropathy and Charcot foot on the right  Patient admitted to the [facility] floor where she is going to receive IV vancomycin cefepime and will stay for more than 2 midnights for treatment of her diabetic foot infection    I am asking ID and podiatrist see the patient as well

## 2018-07-02 NOTE — CONSULTS
Consult - Podiatry   Cornelia Zaragoza 62 y o  female MRN: [id number]  Unit/Bed#: [address] [place], [place] [id number] Encounter: [id number]    Assessment/Plan     Assessment/Plan:  Cellulitis right foot  Ulceration right foot SQ depth/DM 2 with ulcer   -continue IV antibiotics per Infectious Disease   -debridement skin & subcutaneous tissue (64995) - 1 0 sq cm excised sharp with a 10 blade    -deep wound culture obtained with debridement right foot ulceration   -packed with one-quarter-inch iodoform gauze (5 inches), covered with Maxorb Ag and gauze  -MRI ordered has been canceled due to patient's implanted nerve stimulator, CT has been ordered to evaluate foot  Diabetes 2 with neuropathy  Diabetes 2 with Charcot changes of the midfoot   -I do not believe the Charcot process is involved with the current admission  History of Present Illness     HPI:  Cornelia Zaragoza is a 62 y o  female who presents with red swollen right foot  Was seen emergency room one week ago and empirically started Keflex and Bactrim  This did seem to help and she kept having fever and chills  Swelling and redness of her right foot progressed which then precipitated 2nd ER visit and subsequent admission for cellulitis  Patient has a history Charcot degenerative arthropathy of the right foot and previous history osteomyelitis right 2nd toe  Patient's chart reviewed noting pertinent laboratory  Inpatient consult to Podiatry  Consult performed by: Maisha Ramirez  Consult ordered by: [facility]        Review of Systems   Constitutional: Negative  HENT: Negative  Eyes: Negative  Respiratory: Negative  Cardiovascular:  Negative    Gastrointestinal: Negative      Musculoskeletal:  Charcot right midfoot   Skin:  Ulcer right foot   Neurological:  Numbness both feet        Historical Information   Past Medical History:   Diagnosis Date    Anxiety     Asthmatic bronchitis with exacerbation     Depression     Diabetes mellitus ([facility] [address] )     Hyperlipidemia     Hypertension     Other headache syndrome      Past Surgical History:   Procedure Laterality Date    KNEE SURGERY      TONSILLECTOMY       Social History   History   Alcohol Use No     Comment: (history)     History   Drug Use No     History   Smoking Status    Current Every Day Smoker   Smokeless Tobacco    Never Used     Family History:   Family History   Problem Relation Age of Onset    Heart disease Father     Prostate cancer Father     Heart attack Maternal Grandmother     Colon cancer Maternal Grandfather     Breast cancer Paternal Grandmother     Heart attack Paternal Grandfather     Coronary artery disease Family     Diabetes Family     No Known Problems Mother        Meds/Allergies   Prescriptions Prior to Admission   Medication    atenolol-chlorthalidone (TENORETIC) 50-25 mg per tablet    buPROPion (WELLBUTRIN XL) 300 mg 24 hr tablet    carBAMazepine (EPITOL) 200 mg tablet    cephalexin (KEFLEX) 500 mg capsule    Omega-3 Fatty Acids (FISH OIL) 1,000 mg    risperiDONE (RisperDAL) 1 mg tablet    sertraline (ZOLOFT) 100 mg tablet    simvastatin (ZOCOR) 20 mg tablet    sulfamethoxazole-trimethoprim (BACTRIM DS) 800-160 mg per tablet    ziprasidone (GEODON) 60 mg capsule     No Known Allergies    Objective   First Vitals:   Blood Pressure: (!) 192/86 (07/02/18 0718)  Pulse: 96 (07/02/18 0718)  Temperature: (!) 102 3 °F (39 1 °C) (07/02/18 0718)  Temp Source: Oral (07/02/18 1035)  Respirations: 20 (07/02/18 0718)  Height: 5' 5" (165 1 cm) (07/02/18 0718)  Weight - Scale: 99 8 kg (220 lb) (07/02/18 0718)  SpO2: 97 % (07/02/18 0718)    Current Vitals:   Blood Pressure: 106/58 (07/02/18 1522)  Pulse: 75 (07/02/18 1522)  Temperature: 98 8 °F (37 1 °C) (07/02/18 1522)  Temp Source: Oral (07/02/18 1522)  Respirations: 20 (07/02/18 1522)  Height: 5' 5" (165 1 cm) (07/02/18 1035)  Weight - Scale: 99 9 kg (220 lb 3 8 oz) (07/02/18 1035)  SpO2: 93 % (07/02/18 1522)        BP 106/58   Pulse 75   Temp 98 8 °F (37 1 °C) (Oral)   Resp 20   Ht 5' 5" (1 651 m)   Wt 99 9 kg (220 lb 3 8 oz)   SpO2 93%   BMI 36 65 kg/m²     General Appearance:    Alert, cooperative, no distress   Head:    Normocephalic, without obvious abnormality, atraumatic   Eyes:    PERRL, conjunctiva/corneas clear, EOM's intact            Nose:   Moist mucous membranes, no drainage or sinus tenderness   Throat:   No tenderness, no exudates   Neck:   Supple, symmetrical, trachea midline, no JVD   Back:     Symmetric, no CVA tenderness   Lungs:     Respirations unlabored   Chest wall:    No tenderness or deformity   Heart:    Regular rate and rhythm noted on last vitals  Abdomen:     Soft, non-tender, bowel sounds active all four quadrants,     no masses, no organomegaly       Lower Ext/Ortho:   Midfoot Charcot deformity noted on the right which has been stable  No other gross deformities noted   Pulses:  Right:  Palpable, Left:  Palpable,    Capillary Filling:  Immediate, Edema:  +2 right foot   Skin: Texture, Tone and Turgor:  Normal limits, Digital Hair:  Present  Atrophic Changes:  Mild   Lesions:  Left hallux IPJ hyperkeratosis  Ulcers/Wounds:  Right foot submetatarsal #1 SQ ulcerative breakdown measuring 1 0 x 1 0 x 2 0 cm, probes straight down to capsule and then probes additionally 2 cm into the 1st interspace at 10 o'clock, a larger superficial blister/abscess breakdown overlapping the entire area measuring approximately 6 0 x 4 0 x 0 1 cm which tracks into the 1st interspace  Moderate edema calor are and cellulitis of the foot present  No pain or discomfort experience with debridement  Nail Pathology:  Mild dystrophic changes   Neurologic:  CNII-XII intact  Normal strength  Sensation and reflexes:  Diminished epicritic sensations with complete loss of protective sensation, hypoesthesia of both feet                  Lab Results:   CBC w/diff    Results from last 7 days  Lab Units 07/02/18  0758   WBC Thousand/uL 8 57   HEMOGLOBIN g/dL 14 2   HEMATOCRIT % 40 1   PLATELETS Thousands/uL 246   NEUTROS PCT % 83*   LYMPHS PCT % 7*   MONOS PCT % 7   EOS PCT % 2     BMP    Results from last 7 days  Lab Units 07/02/18  0758   SODIUM mmol/L 131*   POTASSIUM mmol/L 4 3   CHLORIDE mmol/L 92*   CO2 mmol/L 29   BUN mg/dL 12   CREATININE mg/dL 0 85   GLUCOSE RANDOM mg/dL 133   CALCIUM mg/dL 8 9     CMP    Results from last 7 days  Lab Units 07/02/18  0758   SODIUM mmol/L 131*   POTASSIUM mmol/L 4 3   CHLORIDE mmol/L 92*   CO2 mmol/L 29   BUN mg/dL 12   CREATININE mg/dL 0 85   CALCIUM mg/dL 8 9   TOTAL PROTEIN g/dL 7 8   BILIRUBIN TOTAL mg/dL 0 50   ALK PHOS U/L 145*   ALT U/L 53   AST U/L 46*   GLUCOSE RANDOM mg/dL 133     @Culture@  Lab Results   Component Value Date    URINECX 70,000-79,000 cfu/ml Mixed Contaminants X6 08/10/2016         Imaging: I have personally reviewed pertinent films in PACS      EKG, Pathology, and Other Studies: I have personally reviewed pertinent reports        Code Status: Level 1 - Full Code  Advance Directive and Living Will:      Power of :    POLST:      Khang Escudero DPM

## 2018-07-02 NOTE — ASSESSMENT & PLAN NOTE
Lab Results   Component Value Date    HGBA1C 5 7 (H) 09/04/2014       Recent Labs      07/02/18   0949   POCGLU  122       Blood Sugar Average: Last 72 hrs:  (P) 122     Patient claims that she is on diabetic diet only  Will check her A1c and will follow her blood sugars q i d

## 2018-07-02 NOTE — CONSULTS
Consultation - Infectious Disease   Tammy Eugene 62 y o  female MRN: [id number]  Unit/Bed#: [address] [place] [id number] Encounter: [id number]      Assessment/Plan     Assessment: 62y o  year old female with PMH of DM with peripheral neuropathy, asthma, HLD, HTN who presents with R foot erythema, and swelling which started one week ago  1  Sepsis due to RLE cellulitis - in the setting of R charcot foot and peripheral neuropathy  Xray R foot showed worsening soft tissue swelling in the midfoot and forefoot   No soft tissue gas to suggest an abscess   No focal bony resorption identified to confirm osteomyelitis  Blood cx are pending along with superficial wound cx    2  Fever - resolved    Plan:  1  Continue vancomycin and cefepime for now while waiting for blood cx   2  Obtain MRI R foot to r/o abscess and osteomyelitis  3  Maintain RLE elevation  History of Present Illness   Physician Requesting Consult: Ravi Valadez MD  Reason for Consult / Principal Problem: R diabetic foot infection  Hx and PE limited by: none  HPI: Tammy Eugene is a 62y o  year old female with PMH of DM with peripheral neuropathy, asthma, HLD, HTN who presents with R foot erythema, and swelling which started one week ago  She presented to ED initially and was given cephalexin and TMP-SMX but symptoms progressed  She denies any trauma or pain but has underlying peripheral neuropathy  She had fever, chills but denies any cough, dyspnea, abdominal pain, N/V/D, dysuria  On admission, she was febrile at 102 3 without leukocytosis  Xray R foot showed worsening soft tissue swelling in the midfoot and forefoot   No soft tissue gas to suggest an abscess   No focal bony resorption identified to confirm osteomyelitis  She was started on vancomycin and cefepime and blood cx are pending       Inpatient consult to Infectious Diseases  Consult performed by: Mina Wright ordered by: Debbie Xie          Review of Systems   Constitutional: Positive for chills and fever  Respiratory: Negative for cough and shortness of breath  Cardiovascular: Negative for chest pain  Gastrointestinal: Negative for abdominal pain, diarrhea, nausea and vomiting  Musculoskeletal: Negative for joint swelling  Skin: Positive for color change and wound  Historical Information   Past Medical History:   Diagnosis Date    Anxiety     Asthmatic bronchitis with exacerbation     Depression     Diabetes mellitus ([address] )     Hyperlipidemia     Hypertension     Other headache syndrome      Past Surgical History:   Procedure Laterality Date    KNEE SURGERY      TONSILLECTOMY       Social History   History   Alcohol Use No     Comment: (history)     History   Drug Use No     History   Smoking Status    Current Every Day Smoker   Smokeless Tobacco    Never Used     Family History: non-contributory    Meds/Allergies   all current active meds have been reviewed    No Known Allergies    Objective     No intake or output data in the 24 hours ending 07/02/18 1612    Invasive Devices:   Peripheral IV 07/02/18 Left Antecubital (Active)   Site Assessment Intact 7/2/2018 11:00 AM   Dressing Type Transparent 7/2/2018 11:00 AM   Line Status Capped 7/2/2018 11:00 AM   Dressing Status Intact 7/2/2018 11:00 AM       Physical Exam   Constitutional: She appears well-developed and well-nourished  Cardiovascular: Normal rate and regular rhythm  Pulmonary/Chest: Effort normal and breath sounds normal  She has no wheezes  Abdominal: Soft  Bowel sounds are normal  There is no tenderness  Musculoskeletal: She exhibits edema  R foot edema with erythema progressing up the leg  Dry open wound noted on plantar area  Skin: Skin is warm  There is erythema  Lab Results: I have personally reviewed pertinent labs  Imaging Studies: I have personally reviewed pertinent reports  EKG, Pathology, and Other Studies: I have personally reviewed pertinent reports        Counseling / Coordination of Care  Total floor / unit time spent today 45 minutes  Greater than 50% of total time was spent with the patient and / or family counseling and / or coordination of care   A description of the counseling / coordination of care:

## 2018-07-02 NOTE — ED PROVIDER NOTES
History  Chief Complaint   Patient presents with    Follow-Up Cellulitis     Patient states she was seen here Thursday for cellulitis on her R foot  Patient states that her foot is getting worse  Patient has a blister and an open area on the bottom of her foot  R foot swollena nd R and open area on the bottom of her foot     Here with worsening pain, swelling and drainage to the right foot  Seen here 6/29 and started on bactrim/keflex  Over weekend noted fever to 102 7, shaking chills, sweats  Has neuropathy and 'doesn't feel' her feet but c/o of pain going up the leg today which is new  Hx of DM, but diet controlled for a couple of years now  Usually has blood work done [facility] [employer] - last about 5m ago was normal   Sees podiatry monthly and states there was no wound or ulcer to the foot last month  Hasn't seen Dr Irvin Lopez in follow up yet  History provided by:  Patient   used: No    Leg Pain   Location:  Leg, ankle and foot  Injury: no    Leg location:  R lower leg  Foot location:  R foot  Pain details:     Quality:  Shooting    Radiates to:  Does not radiate    Severity:  Moderate    Onset quality:  Gradual    Timing:  Constant    Progression:  Worsening  Chronicity:  New  Foreign body present:  No foreign bodies  Prior injury to area:  No  Relieved by:  Nothing  Worsened by:  Nothing  Ineffective treatments:  None tried  Associated symptoms: decreased ROM, fatigue, fever and swelling    Associated symptoms: no back pain, no numbness, no stiffness and no tingling        Prior to Admission Medications   Prescriptions Last Dose Informant Patient Reported? Taking?    Omega-3 Fatty Acids (FISH OIL) 1,000 mg  Self Yes No   Sig: Take 1 capsule by mouth 2 (two) times a day   atenolol-chlorthalidone (TENORETIC) 50-25 mg per tablet   No No   Sig: Take 0 5 tablets by mouth daily   buPROPion (WELLBUTRIN XL) 300 mg 24 hr tablet  Self Yes No   Sig: Take 1 tablet by mouth daily   carBAMazepine (EPITOL) 200 mg tablet  Self Yes No   Sig: Take 3 tablets by mouth   cephalexin (KEFLEX) 500 mg capsule   No No   Sig: Take 1 capsule (500 mg total) by mouth 4 (four) times a day for 7 days   risperiDONE (RisperDAL) 1 mg tablet  Self Yes No   sertraline (ZOLOFT) 100 mg tablet  Self Yes No   Sig: Take 1 tablet by mouth daily   simvastatin (ZOCOR) 20 mg tablet   No No   Sig: Take 1 tablet (20 mg total) by mouth daily   sulfamethoxazole-trimethoprim (BACTRIM DS) 800-160 mg per tablet   No No   Sig: Take 1 tablet by mouth 2 (two) times a day for 10 days smx-tmp DS (BACTRIM) 800-160 mg tabs (1tab q12 D10)   ziprasidone (GEODON) 60 mg capsule  Self Yes No   Sig: Take by mouth      Facility-Administered Medications: None       Past Medical History:   Diagnosis Date    Anxiety     Asthmatic bronchitis with exacerbation     Depression     Diabetes mellitus (HCC)     Hyperlipidemia     Hypertension     Other headache syndrome        Past Surgical History:   Procedure Laterality Date    KNEE SURGERY      TONSILLECTOMY         Family History   Problem Relation Age of Onset    Heart disease Father     Prostate cancer Father     Heart attack Maternal Grandmother     Colon cancer Maternal Grandfather     Breast cancer Paternal Grandmother     Heart attack Paternal Grandfather     Coronary artery disease Family     Diabetes Family     No Known Problems Mother      I have reviewed and agree with the history as documented  Social History   Substance Use Topics    Smoking status: Current Every Day Smoker    Smokeless tobacco: Never Used    Alcohol use No      Comment: (history)        Review of Systems   Constitutional: Positive for fatigue and fever  Musculoskeletal: Negative for back pain and stiffness  All other systems reviewed and are negative  Physical Exam  Physical Exam   Constitutional: She appears well-developed and well-nourished     HENT:   Nose: Nose normal    Eyes: Conjunctivae are normal  Neck: Neck supple  Cardiovascular: Normal rate  Pulmonary/Chest: Effort normal    Abdominal: Soft  Musculoskeletal: She exhibits no deformity  Neurological: She is alert  Skin: Skin is warm  Psychiatric: She has a normal mood and affect  Nursing note and vitals reviewed                Vital Signs  ED Triage Vitals   Temperature Pulse Respirations Blood Pressure SpO2   07/02/18 0718 07/02/18 0718 07/02/18 0718 07/02/18 0718 07/02/18 0718   (!) 102 3 °F (39 1 °C) 96 20 (!) 192/86 97 %      Temp Source Heart Rate Source Patient Position - Orthostatic VS BP Location FiO2 (%)   07/02/18 1035 -- 07/02/18 1035 07/02/18 1035 --   Oral  Lying Right arm       Pain Score       07/02/18 0718       6           Vitals:    07/02/18 1000 07/02/18 1015 07/02/18 1035 07/02/18 1522   BP: 110/56 110/56 131/60 106/58   Pulse: 87 82 83 75   Patient Position - Orthostatic VS:   Lying        Visual Acuity  Visual Acuity      Most Recent Value   L Pupil Size (mm)  3   R Pupil Size (mm)  3          ED Medications  Medications   buPROPion (WELLBUTRIN XL) 24 hr tablet 300 mg (300 mg Oral Not Given 7/2/18 1212)   risperiDONE (RisperDAL) tablet 1 mg (not administered)   sertraline (ZOLOFT) tablet 100 mg (100 mg Oral Not Given 7/2/18 1212)   pravastatin (PRAVACHOL) tablet 20 mg (20 mg Oral Not Given 7/2/18 1651)   ziprasidone (GEODON) capsule 60 mg (not administered)   ondansetron (ZOFRAN) injection 4 mg (not administered)   nicotine (NICODERM CQ) 7 mg/24hr TD 24 hr patch 1 patch (1 patch Transdermal Not Given 7/2/18 1259)   heparin (porcine) subcutaneous injection 5,000 Units (5,000 Units Subcutaneous Given 7/2/18 1355)   acetaminophen (TYLENOL) tablet 650 mg (not administered)   vancomycin (VANCOCIN) 1,500 mg in sodium chloride 0 9 % 250 mL IVPB (not administered)   cefepime (MAXIPIME) IVPB (premix) 1,000 mg (not administered)   atenolol (TENORMIN) tablet 50 mg (50 mg Oral Not Given 7/2/18 1212)   insulin lispro (HumaLOG) 100 units/mL subcutaneous injection 1-5 Units (1 Units Subcutaneous Not Given 7/2/18 1644)   carBAMazepine (TEGretol) tablet 600 mg (600 mg Oral Given 7/2/18 1651)   sodium chloride 0 9 % bolus 1,000 mL (1,000 mL Intravenous New Bag 7/2/18 0802)   vancomycin (VANCOCIN) 1,500 mg in sodium chloride 0 9 % 250 mL IVPB (0 mg/kg × 99 8 kg Intravenous Stopped 7/2/18 0915)   cefepime (MAXIPIME) IVPB (premix) 2,000 mg (0 mg Intravenous Stopped 7/2/18 1001)   acetaminophen (TYLENOL) tablet 975 mg (975 mg Oral Given 7/2/18 0803)       Diagnostic Studies  Results Reviewed     Procedure Component Value Units Date/Time    Wound culture and Gram stain [[id number]] Collected:  07/02/18 0757    Lab Status:  Preliminary result Specimen:  Wound from Foot, Right Updated:  07/02/18 1546     Gram Stain Result No Polys or Bacteria seen    Fingerstick Glucose (POCT) [[id number]]  (Normal) Collected:  07/02/18 0949    Lab Status:  Final result Updated:  07/02/18 0950     POC Glucose 122 mg/dl     Blood culture #1 [[id number]] Collected:  07/02/18 0809    Lab Status: In process Specimen:  Blood from Arm, Right Updated:  07/02/18 0834    Lactic acid x2 [[id number]]  (Normal) Collected:  07/02/18 0758    Lab Status:  Final result Specimen:  Blood from Arm, Left Updated:  07/02/18 0830     LACTIC ACID 1 4 mmol/L     Narrative:         Result may be elevated if tourniquet was used during collection      Comprehensive metabolic panel [[id number]]  (Abnormal) Collected:  07/02/18 0758    Lab Status:  Final result Specimen:  Blood from Arm, Left Updated:  07/02/18 0826     Sodium 131 (L) mmol/L      Potassium 4 3 mmol/L      Chloride 92 (L) mmol/L      CO2 29 mmol/L      Anion Gap 10 mmol/L      BUN 12 mg/dL      Creatinine 0 85 mg/dL      Glucose 133 mg/dL      Calcium 8 9 mg/dL      AST 46 (H) U/L      ALT 53 U/L      Alkaline Phosphatase 145 (H) U/L      Total Protein 7 8 g/dL      Albumin 3 3 (L) g/dL      Total Bilirubin 0 50 mg/dL      eGFR 76 ml/min/1 73sq m     Narrative:         National Kidney Disease Education Program recommendations are as follows:  GFR calculation is accurate only with a steady state creatinine  Chronic Kidney disease less than 60 ml/min/1 73 sq  meters  Kidney failure less than 15 ml/min/1 73 sq  meters  Protime-INR [[id number]]  (Abnormal) Collected:  07/02/18 0758    Lab Status:  Final result Specimen:  Blood from Arm, Left Updated:  07/02/18 3620     Protime 14 3 (H) seconds      INR 1 18 (H)    APTT [[id number]]  (Normal) Collected:  07/02/18 0758    Lab Status:  Final result Specimen:  Blood from Arm, Left Updated:  07/02/18 0822     PTT 33 seconds     CBC and differential [[id number]]  (Abnormal) Collected:  07/02/18 0758    Lab Status:  Final result Specimen:  Blood from Arm, Left Updated:  07/02/18 0810     WBC 8 57 Thousand/uL      RBC 4 21 Million/uL      Hemoglobin 14 2 g/dL      Hematocrit 40 1 %      MCV 95 fL      MCH 33 7 pg      MCHC 35 4 g/dL      RDW 11 7 %      MPV 8 8 (L) fL      Platelets 460 Thousands/uL      nRBC 0 /100 WBCs      Neutrophils Relative 83 (H) %      Immat GRANS % 0 %      Lymphocytes Relative 7 (L) %      Monocytes Relative 7 %      Eosinophils Relative 2 %      Basophils Relative 1 %      Neutrophils Absolute 7 17 Thousands/µL      Immature Grans Absolute 0 03 Thousand/uL      Lymphocytes Absolute 0 57 (L) Thousands/µL      Monocytes Absolute 0 63 Thousand/µL      Eosinophils Absolute 0 13 Thousand/µL      Basophils Absolute 0 04 Thousands/µL     Blood culture #2 [[id number]] Collected:  07/02/18 0758    Lab Status: In process Specimen:  Blood from Arm, Left Updated:  07/02/18 0807                 XR foot 3+ views RIGHT   ED Interpretation by Miranda Schreiber DO (07/02 0813)   Soft tissue swelling, no fx, no appreciable cortical defects      Final Result by Tierney Zelaya DO (07/02 0137)   Worsening soft tissue swelling in the midfoot and forefoot  No soft tissue gas to suggest an abscess    No focal bony resorption identified to confirm osteomyelitis  If there is clinical concern for osteomyelitis: Recommend radioisotope labeled white    blood cell scan or MRI  Workstation performed: [id number]         MRI inpatient order    (Results Pending)              Procedures  Procedures       Phone Contacts  ED Phone Contact    ED Course                         Initial Sepsis Screening     [address] Name 07/02/18 1104 07/02/18 0742             Is the patient's history suggestive of a new or worsening infection?  (!)  Yes (Proceed)  -VL       Suspected source of infection   wound infection  -VL       Are two or more of the following signs & symptoms of infection both present and new to the patient?  (!)  Yes (Proceed)  -VL       Indicate SIRS criteria   Hyperthemia > 38 3C (100 9F); Tachycardia > 90 bpm  -VL       If the answer is yes to both questions, suspicion of sepsis is present  [de-identified]         If severe sepsis is present AND tissue hypoperfusion perists in the hour after fluid resuscitation or lactate > 4, the patient meets criteria for SEPTIC SHOCK           Are any of the following organ dysfunction criteria present within 6 hours of suspected infection and SIRS criteria that are NOT considered to be chronic conditions? (!)  Yes  -CB         Organ dysfunction           Date of presentation of severe sepsis           Time of presentation of severe sepsis           Tissue hypoperfusion persists in the hour after crystalloid fluid administration, evidenced, by either:           Was hypotension present within one hour of the conclusion of crystalloid fluid administration?  No  -CB         Date of presentation of septic shock           Time of presentation of septic shock             User Key  (r) = Recorded By, (t) = Taken By, (c) = Cosigned By    [address] Name Provider Jay Villagomez MD Physician    ALYSA Garcia, DO Physician                  MDM  Number of Diagnoses or Management Options  Diabetic infection of right foot [facility]): new and requires workup  Failure of outpatient treatment: new and requires workup     Amount and/or Complexity of Data Reviewed  Clinical lab tests: reviewed and ordered  Tests in the radiology section of CPT®: reviewed and ordered  Obtain history from someone other than the patient: yes  Independent visualization of images, tracings, or specimens: yes      CritCare Time    Disposition  Final diagnoses:   Diabetic infection of right foot ([facility] [address] )   Failure of outpatient treatment     Time reflects when diagnosis was documented in both MDM as applicable and the Disposition within this note     Time User Action Codes Description Comment    7/2/2018  8:52 AM Sherren Nahed L Add [E11 628,  L08 9] Diabetic infection of right foot ([facility] [address] )     7/2/2018  9:05 AM Sugey Chairez Add [Z78 9] Failure of outpatient treatment       ED Disposition     ED Disposition Condition Comment    Admit  Case was discussed with Dr Latonia Reyes and the patient's admission status was agreed to be Admission Status: inpatient status to the service of Dr Latonia Reyes           Follow-up Information    None         Current Discharge Medication List      CONTINUE these medications which have NOT CHANGED    Details   atenolol-chlorthalidone (TENORETIC) 50-25 mg per tablet Take 0 5 tablets by mouth daily  Qty: 90 tablet, Refills: 1    Associated Diagnoses: Essential hypertension      buPROPion (WELLBUTRIN XL) 300 mg 24 hr tablet Take 1 tablet by mouth daily      carBAMazepine (EPITOL) 200 mg tablet Take 3 tablets by mouth      cephalexin (KEFLEX) 500 mg capsule Take 1 capsule (500 mg total) by mouth 4 (four) times a day for 7 days  Qty: 28 capsule, Refills: 0    Associated Diagnoses: Cellulitis of right foot      Omega-3 Fatty Acids (FISH OIL) 1,000 mg Take 1 capsule by mouth 2 (two) times a day      risperiDONE (RisperDAL) 1 mg tablet       sertraline (ZOLOFT) 100 mg tablet Take 1 tablet by mouth daily      simvastatin (ZOCOR) 20 mg tablet Take 1 tablet (20 mg total) by mouth daily  Qty: 90 tablet, Refills: 1    Associated Diagnoses: Hyperlipidemia, unspecified hyperlipidemia type      sulfamethoxazole-trimethoprim (BACTRIM DS) 800-160 mg per tablet Take 1 tablet by mouth 2 (two) times a day for 10 days smx-tmp DS (BACTRIM) 800-160 mg tabs (1tab q12 D10)  Qty: 20 tablet, Refills: 0    Associated Diagnoses: Cellulitis of right foot      ziprasidone (GEODON) 60 mg capsule Take by mouth           No discharge procedures on file      ED Provider  Electronically Signed by           Kylah Merida DO  07/02/18 0691

## 2018-07-02 NOTE — H&P
H&P- Mehnaz Colindres 1960, 62 y o  female MRN: [id number]    Unit/Bed#: [address] [place] [address] Encounter: [id number]    Primary Care Provider: Beata Gil DO   Date and time admitted to hospital: 7/2/2018  7:17 AM        * Cellulitis of right lower extremity   Assessment & Plan    Patient has significant cellulitis of the right dorsum of the foot and she failed outpatient therapy with Keflex with Bactrim  This is in the setting of diabetic neuropathy and Charcot foot on the right  Patient admitted to the [facility] floor where she is going to receive IV vancomycin cefepime and will stay for more than 2 midnights for treatment of her diabetic foot infection  I am asking ID and podiatrist see the patient as well        Sepsis [facility])   Assessment & Plan    She meets criteria for sepsis with temperature of more than 101, heart of more than 90 and respiratory 20 she does not meet criteria for severe sepsis        Type 2 diabetes mellitus with diabetic neuropathy, without long-term current use of insulin [facility])   Assessment & Plan    Lab Results   Component Value Date    HGBA1C 5 7 (H) 09/04/2014       Recent Labs      07/02/18   0949   POCGLU  122       Blood Sugar Average: Last 72 hrs:  (P) 122     Patient claims that she is on diabetic diet only  Will check her A1c and will follow her blood sugars q i d  Bipolar I disorder, most recent episode mixed, severe without psychotic features ([facility] [address] )   Assessment & Plan    She is stable on her Geodon, Risperdal, tegretol, sertraline that I will continue        Essential hypertension   Assessment & Plan    Will continue atenolol but will stop hydrochlorothiazide        Hyponatremia   Assessment & Plan    Patient's hyponatremia is likely due to hydrochlorothiazide that I will stop    Will monitor sodium level            VTE Prophylaxis: ordered    Code Status: as above    Anticipated Length of Stay: as above    Justification for Hospital Stay: see assessment and plan        Chief Complaint:   Right foot swelling and fever    History of Present Illness:    Mayr Sauer is a 62 y o  female who presents with above chief compaint  Patient is a type 2 diabetic on diet only with lower extremity neuropathy and right Charcot foot who presented to the ER last week with a rash and redness in the right foot  She was prescribed Keflex and Bactrim that she was taking over the weekend without much results because she kept having fevers, chills and the swelling and redness in the right foot was progressively getting worse  This prompted her to come to the ER for evaluation  Otherwise she denies any history of DVT, coronary disease, CHF, arrhythmia, COPD, asthma  Has no chest pain, shortness of breath, nausea, abdominal pain, dysuria  Review of Systems:    Review of Systems   Constitutional: Positive for chills and fever  HENT: Negative for congestion  Eyes: Negative for visual disturbance  Respiratory: Negative for cough and shortness of breath  Cardiovascular: Negative for chest pain, palpitations and leg swelling  Gastrointestinal: Negative for abdominal pain, blood in stool and diarrhea  Endocrine: Negative for polydipsia and polyphagia  Genitourinary: Negative for difficulty urinating and dysuria  Musculoskeletal: Negative for myalgias and neck stiffness  Skin: Positive for color change, rash and wound  Neurological: Positive for numbness  Negative for weakness and headaches  Hematological: Negative for adenopathy  Psychiatric/Behavioral: Negative for dysphoric mood  All other systems reviewed and are negative        Past Medical and Surgical History:     Past Medical History:   Diagnosis Date    Anxiety     Asthmatic bronchitis with exacerbation     Depression     Diabetes mellitus ([facility] [address] )     Hyperlipidemia     Hypertension     Other headache syndrome        Past Surgical History:   Procedure Laterality Date    KNEE SURGERY      TONSILLECTOMY         Meds/Allergies:    Prior to Admission Medications   Prescriptions Last Dose Informant Patient Reported? Taking?    Omega-3 Fatty Acids (FISH OIL) 1,000 mg  Self Yes No   Sig: Take 1 capsule by mouth 2 (two) times a day   atenolol-chlorthalidone (TENORETIC) 50-25 mg per tablet   No No   Sig: Take 0 5 tablets by mouth daily   buPROPion (WELLBUTRIN XL) 300 mg 24 hr tablet  Self Yes No   Sig: Take 1 tablet by mouth daily   carBAMazepine (EPITOL) 200 mg tablet  Self Yes No   Sig: Take 3 tablets by mouth   cephalexin (KEFLEX) 500 mg capsule   No No   Sig: Take 1 capsule (500 mg total) by mouth 4 (four) times a day for 7 days   risperiDONE (RisperDAL) 1 mg tablet  Self Yes No   sertraline (ZOLOFT) 100 mg tablet  Self Yes No   Sig: Take 1 tablet by mouth daily   simvastatin (ZOCOR) 20 mg tablet   No No   Sig: Take 1 tablet (20 mg total) by mouth daily   sulfamethoxazole-trimethoprim (BACTRIM DS) 800-160 mg per tablet   No No   Sig: Take 1 tablet by mouth 2 (two) times a day for 10 days smx-tmp DS (BACTRIM) 800-160 mg tabs (1tab q12 D10)   ziprasidone (GEODON) 60 mg capsule  Self Yes No   Sig: Take by mouth      Facility-Administered Medications: None       Allergies: No Known Allergies    Social History:     History   Alcohol Use No     Comment: (history)     History   Smoking Status    Current Every Day Smoker   Smokeless Tobacco    Never Used     History   Drug Use No       Family History:    Family History   Problem Relation Age of Onset    Heart disease Father     Prostate cancer Father     Heart attack Maternal Grandmother     Colon cancer Maternal Grandfather     Breast cancer Paternal Grandmother     Heart attack Paternal Grandfather     Coronary artery disease Family     Diabetes Family     No Known Problems Mother        Physical Exam:     Vitals:   Blood Pressure: 131/60 (07/02/18 1035)  Pulse: 83 (07/02/18 1035)  Temperature: 98 5 °F (36 9 °C) (07/02/18 1035)  Temp Source: Oral (07/02/18 1035)  Respirations: 18 (07/02/18 1035)  Height: 5' 5" (165 1 cm) (07/02/18 1035)  Weight - Scale: 99 9 kg (220 lb 3 8 oz) (07/02/18 1035)  SpO2: 93 % (07/02/18 1035)    Physical Exam   Constitutional: She is oriented to person, place, and time  She appears well-developed  No distress  HENT:   Head: Normocephalic  Mouth/Throat: Oropharynx is clear and moist    Eyes: Conjunctivae are normal    Neck: Neck supple  Cardiovascular: Normal rate and regular rhythm  Pulmonary/Chest: Effort normal  No respiratory distress  She has no wheezes  She has no rales  Abdominal: Soft  Bowel sounds are normal  She exhibits no distension  There is no tenderness  Musculoskeletal: She exhibits deformity  Lymphadenopathy:     She has no cervical adenopathy  Neurological: She is alert and oriented to person, place, and time  No cranial nerve deficit  Skin: Skin is warm and dry  Rash noted  Patient has sinus of the right foot which is extensive associated with warmth, edema and right Charcot foot   Psychiatric: She has a normal mood and affect  Vitals reviewed            Additional Data:     Lab Results: I personally reviewed them      Results from last 7 days  Lab Units 07/02/18  0758   WBC Thousand/uL 8 57   HEMOGLOBIN g/dL 14 2   HEMATOCRIT % 40 1   PLATELETS Thousands/uL 246   NEUTROS PCT % 83*   LYMPHS PCT % 7*   MONOS PCT % 7   EOS PCT % 2       Results from last 7 days  Lab Units 07/02/18  0758   SODIUM mmol/L 131*   POTASSIUM mmol/L 4 3   CHLORIDE mmol/L 92*   CO2 mmol/L 29   BUN mg/dL 12   CREATININE mg/dL 0 85   CALCIUM mg/dL 8 9   TOTAL PROTEIN g/dL 7 8   BILIRUBIN TOTAL mg/dL 0 50   ALK PHOS U/L 145*   ALT U/L 53   AST U/L 46*   GLUCOSE RANDOM mg/dL 133       Results from last 7 days  Lab Units 07/02/18  0758   INR  1 18*       Imaging: I personally reviewed them    XR foot 3+ views RIGHT   ED Interpretation by Bernadette Garcia DO (07/02 0813)   Soft tissue swelling, no fx, no appreciable cortical defects      Final Result by Lexi King DO (07/02 9139)   Worsening soft tissue swelling in the midfoot and forefoot  No soft tissue gas to suggest an abscess  No focal bony resorption identified to confirm osteomyelitis  If there is clinical concern for osteomyelitis: Recommend radioisotope labeled white    blood cell scan or MRI  Workstation performed: [id number]             EKG : I personally reviewed      Christina Brooke MD    ** Please Note: This note has been constructed using a voice recognition system   **

## 2018-07-02 NOTE — SOCIAL WORK
Met with Pt  Pt presents AA&Ox3  Discussed role of   Pt lives with  in h, ramp to enter  Pt is independent with adls and ambulation  Pt and Pt's  drive and go grocery shopping  Pt goes to [facility] in [place]  Await PT/OT stoney  Will follow for discharge planning

## 2018-07-02 NOTE — PROGRESS NOTES
[place] called patient to report that she is not ever to have an MRI done because she has this implanted device    Called and informed Dr Nithya Fan

## 2018-07-03 ENCOUNTER — APPOINTMENT (INPATIENT)
Dept: CT IMAGING | Facility: HOSPITAL | Age: 58
DRG: 854 | End: 2018-07-03
Payer: MEDICARE

## 2018-07-03 LAB
ANION GAP SERPL CALCULATED.3IONS-SCNC: 8 MMOL/L (ref 4–13)
BUN SERPL-MCNC: 12 MG/DL (ref 5–25)
CALCIUM SERPL-MCNC: 8.4 MG/DL (ref 8.3–10.1)
CHLORIDE SERPL-SCNC: 101 MMOL/L (ref 100–108)
CO2 SERPL-SCNC: 27 MMOL/L (ref 21–32)
CREAT SERPL-MCNC: 0.96 MG/DL (ref 0.6–1.3)
ERYTHROCYTE [DISTWIDTH] IN BLOOD BY AUTOMATED COUNT: 11.9 % (ref 11.6–15.1)
ERYTHROCYTE [SEDIMENTATION RATE] IN BLOOD: 98 MM/HOUR (ref 0–15)
EST. AVERAGE GLUCOSE BLD GHB EST-MCNC: 143 MG/DL
GFR SERPL CREATININE-BSD FRML MDRD: 66 ML/MIN/1.73SQ M
GLUCOSE SERPL-MCNC: 102 MG/DL (ref 65–140)
GLUCOSE SERPL-MCNC: 109 MG/DL (ref 65–140)
GLUCOSE SERPL-MCNC: 109 MG/DL (ref 65–140)
GLUCOSE SERPL-MCNC: 112 MG/DL (ref 65–140)
HBA1C MFR BLD: 6.6 % (ref 4.2–6.3)
HCT VFR BLD AUTO: 37.1 % (ref 34.8–46.1)
HGB BLD-MCNC: 12.6 G/DL (ref 11.5–15.4)
MCH RBC QN AUTO: 33.2 PG (ref 26.8–34.3)
MCHC RBC AUTO-ENTMCNC: 34 G/DL (ref 31.4–37.4)
MCV RBC AUTO: 98 FL (ref 82–98)
PLATELET # BLD AUTO: 255 THOUSANDS/UL (ref 149–390)
PMV BLD AUTO: 9.1 FL (ref 8.9–12.7)
POTASSIUM SERPL-SCNC: 3.9 MMOL/L (ref 3.5–5.3)
RBC # BLD AUTO: 3.8 MILLION/UL (ref 3.81–5.12)
SODIUM SERPL-SCNC: 136 MMOL/L (ref 136–145)
VANCOMYCIN TROUGH SERPL-MCNC: 13.9 UG/ML (ref 10–20)
WBC # BLD AUTO: 4.69 THOUSAND/UL (ref 4.31–10.16)

## 2018-07-03 PROCEDURE — G8978 MOBILITY CURRENT STATUS: HCPCS

## 2018-07-03 PROCEDURE — G8980 MOBILITY D/C STATUS: HCPCS

## 2018-07-03 PROCEDURE — 99232 SBSQ HOSP IP/OBS MODERATE 35: CPT | Performed by: INTERNAL MEDICINE

## 2018-07-03 PROCEDURE — 83036 HEMOGLOBIN GLYCOSYLATED A1C: CPT | Performed by: INTERNAL MEDICINE

## 2018-07-03 PROCEDURE — 85027 COMPLETE CBC AUTOMATED: CPT | Performed by: INTERNAL MEDICINE

## 2018-07-03 PROCEDURE — 97162 PT EVAL MOD COMPLEX 30 MIN: CPT

## 2018-07-03 PROCEDURE — 73701 CT LOWER EXTREMITY W/DYE: CPT

## 2018-07-03 PROCEDURE — G8979 MOBILITY GOAL STATUS: HCPCS

## 2018-07-03 PROCEDURE — 85652 RBC SED RATE AUTOMATED: CPT | Performed by: PODIATRIST

## 2018-07-03 PROCEDURE — 80048 BASIC METABOLIC PNL TOTAL CA: CPT | Performed by: INTERNAL MEDICINE

## 2018-07-03 PROCEDURE — 80202 ASSAY OF VANCOMYCIN: CPT | Performed by: INTERNAL MEDICINE

## 2018-07-03 PROCEDURE — 82948 REAGENT STRIP/BLOOD GLUCOSE: CPT

## 2018-07-03 RX ADMIN — HEPARIN SODIUM 5000 UNITS: 5000 INJECTION, SOLUTION INTRAVENOUS; SUBCUTANEOUS at 05:00

## 2018-07-03 RX ADMIN — VANCOMYCIN HYDROCHLORIDE 1500 MG: 1 INJECTION, POWDER, LYOPHILIZED, FOR SOLUTION INTRAVENOUS at 21:01

## 2018-07-03 RX ADMIN — ZIPRASIDONE HCL 60 MG: 20 CAPSULE ORAL at 20:04

## 2018-07-03 RX ADMIN — HEPARIN SODIUM 5000 UNITS: 5000 INJECTION, SOLUTION INTRAVENOUS; SUBCUTANEOUS at 13:00

## 2018-07-03 RX ADMIN — CEFEPIME HYDROCHLORIDE 1000 MG: 1 INJECTION, SOLUTION INTRAVENOUS at 08:08

## 2018-07-03 RX ADMIN — SERTRALINE HYDROCHLORIDE 100 MG: 100 TABLET ORAL at 08:08

## 2018-07-03 RX ADMIN — ATENOLOL 50 MG: 50 TABLET ORAL at 08:08

## 2018-07-03 RX ADMIN — BUPROPION HYDROCHLORIDE 300 MG: 300 TABLET, FILM COATED, EXTENDED RELEASE ORAL at 08:08

## 2018-07-03 RX ADMIN — RISPERIDONE 1 MG: 1 TABLET ORAL at 20:04

## 2018-07-03 RX ADMIN — PRAVASTATIN SODIUM 20 MG: 20 TABLET ORAL at 17:41

## 2018-07-03 RX ADMIN — CARBAMAZEPINE 600 MG: 200 TABLET ORAL at 20:04

## 2018-07-03 RX ADMIN — CEFEPIME HYDROCHLORIDE 1000 MG: 1 INJECTION, SOLUTION INTRAVENOUS at 20:03

## 2018-07-03 RX ADMIN — VANCOMYCIN HYDROCHLORIDE 1500 MG: 1 INJECTION, POWDER, LYOPHILIZED, FOR SOLUTION INTRAVENOUS at 08:17

## 2018-07-03 RX ADMIN — IOHEXOL 100 ML: 350 INJECTION, SOLUTION INTRAVENOUS at 13:53

## 2018-07-03 RX ADMIN — HEPARIN SODIUM 5000 UNITS: 5000 INJECTION, SOLUTION INTRAVENOUS; SUBCUTANEOUS at 21:01

## 2018-07-03 NOTE — PROGRESS NOTES
Progress Note - Podiatry  Sylvie Sierra 62 y o  female MRN: [id number]  Unit/Bed#: [address] [place] [address] [id number] Encounter: [id number]    Assessment/Plan:  Cellulitis right foot   -significantly improved right foot with less erythema and edema compared to yesterday    -infectious disease notes appreciated              -continue IV antibiotics per Infectious Disease  Ulceration right foot SQ depth/DM 2 with ulcer              -wound packed with one-quarter-inch iodoform and covered with Maxorb Ag  -CT scan with contrast this afternoon to evaluate possible osteomyelitis of the 1st MPJ    -radiology concerned with possible abscess formation at the 1st metatarsal cuneiform joint, clinical presentation is not consistent with an abscess at this location, no calor or or palpable fluctuance noted in this region, in light of previous Charcot degenerative breakdown of the midfoot resulting in a abducted pronated forefoot, I feel the suspected abscess may represent a bursa/collection of fluid associated with the Charcot process  -will obtain 2nd opinion Dr Lazaro Robles before proceeding with trip to the operating  Diabetes 2 with neuropathy  Diabetes 2 with Charcot changes of the midfoot                  Subjective/Objective   Chief Complaint:   Chief Complaint   Patient presents with    Follow-Up Cellulitis     Patient states she was seen here Thursday for cellulitis on her R foot  Patient states that her foot is getting worse  Patient has a blister and an open area on the bottom of her foot  R foot swollena nd R and open area on the bottom of her foot       Subjective:  51-year-old white female type 2 diabetic resting comfortably in bed, relates no pain or discomfort from her foot  Relates noticing little less swelling in her leg  Denies any shortness of breath or new pain and discomfort  Blood pressure 117/55, pulse 63, temperature 97 9 °F (36 6 °C), temperature source Oral, resp   rate 18, height 5' 5" (1 651 m), weight 99 kg (218 lb 4 1 oz), SpO2 92 %  ,Body mass index is 36 32 kg/m²  Invasive Devices     Peripheral Intravenous Line            Peripheral IV 07/02/18 Left 1 day                Physical Exam:   General appearance: alert, cooperative and no distress  Neuro/Vascular: Right: DP & PT Palpable, Left: DP & PT Palpable,    Capillary Filling:  Immediate, Edema:  +2 right foot, and left foot  Extremity:       Right foot submetatarsal #1 SQ ulcerative breakdown measuring 1 0 x 1 0 x 2 0 cm, probes to capsule and then probes additionally 2 cm into the 1st interspace at 10 o'clock,  larger superficial breakdown of the peripheral skin is dry today without active drainage  Edema and calor are diminished overall            Labs and other studies:   CBC w/diff    Results from last 7 days  Lab Units 07/03/18  0500 07/02/18  0758   WBC Thousand/uL 4 69 8 57   HEMOGLOBIN g/dL 12 6 14 2   HEMATOCRIT % 37 1 40 1   PLATELETS Thousands/uL 255 246   NEUTROS PCT %  --  83*   LYMPHS PCT %  --  7*   MONOS PCT %  --  7   EOS PCT %  --  2     BMP    Results from last 7 days  Lab Units 07/03/18  0500   SODIUM mmol/L 136   POTASSIUM mmol/L 3 9   CHLORIDE mmol/L 101   CO2 mmol/L 27   BUN mg/dL 12   CREATININE mg/dL 0 96   GLUCOSE RANDOM mg/dL 112   CALCIUM mg/dL 8 4     CMP    Results from last 7 days  Lab Units 07/03/18  0500 07/02/18  0758   SODIUM mmol/L 136 131*   POTASSIUM mmol/L 3 9 4 3   CHLORIDE mmol/L 101 92*   CO2 mmol/L 27 29   BUN mg/dL 12 12   CREATININE mg/dL 0 96 0 85   CALCIUM mg/dL 8 4 8 9   TOTAL PROTEIN g/dL  --  7 8   BILIRUBIN TOTAL mg/dL  --  0 50   ALK PHOS U/L  --  145*   ALT U/L  --  53   AST U/L  --  46*   GLUCOSE RANDOM mg/dL 112 133       @Culture@  Lab Results   Component Value Date    URINECX 70,000-79,000 cfu/ml Mixed Contaminants X6 08/10/2016         Current Facility-Administered Medications:     acetaminophen (TYLENOL) tablet 650 mg, 650 mg, Oral, Q6H PRN, Yohana Gross MD    atenolol (TENORMIN) tablet 50 mg, 50 mg, Oral, Daily, Aashish Vick MD, 50 mg at 07/03/18 0808    buPROPion (WELLBUTRIN XL) 24 hr tablet 300 mg, 300 mg, Oral, Daily, Aashish Vick MD, 300 mg at 07/03/18 0808    carBAMazepine (TEGretol) tablet 600 mg, 600 mg, Oral, Daily With Dinner, Eveline Fly, DO    cefepime (MAXIPIME) IVPB (premix) 1,000 mg, 1,000 mg, Intravenous, Q12H, Aashish Vick MD, Last Rate: 100 mL/hr at 07/03/18 0808, 1,000 mg at 07/03/18 0808    heparin (porcine) subcutaneous injection 5,000 Units, 5,000 Units, Subcutaneous, Q8H [address], 5,000 Units at 07/03/18 0500 **AND** [CANCELED] Platelet count, , , Once, Aashish Vick MD    insulin lispro (HumaLOG) 100 units/mL subcutaneous injection 1-5 Units, 1-5 Units, Subcutaneous, TID AC, 1 Units at 07/02/18 1400 **AND** Fingerstick Glucose (POCT), , , TID AC, Aashish Vick MD    nicotine (NICODERM CQ) 7 mg/24hr TD 24 hr patch 1 patch, 1 patch, Transdermal, Daily, Aashish Vick MD    ondansetron (ZOFRAN) injection 4 mg, 4 mg, Intravenous, Q6H PRN, Aashish Vick MD    pravastatin (PRAVACHOL) tablet 20 mg, 20 mg, Oral, Daily With Dinner, Aashish Vick MD    risperiDONE (RisperDAL) tablet 1 mg, 1 mg, Oral, HS, Eveline Fly, DO    sertraline (ZOLOFT) tablet 100 mg, 100 mg, Oral, Daily, Aashish Vick MD, 100 mg at 07/03/18 0808    vancomycin (VANCOCIN) 1,500 mg in sodium chloride 0 9 % 250 mL IVPB, 15 mg/kg, Intravenous, Q12H, Aashish Vick MD, Last Rate: 166 7 mL/hr at 07/03/18 0817, 1,500 mg at 07/03/18 0817    ziprasidone (GEODON) capsule 60 mg, 60 mg, Oral, HS, Rachel Burton, DO    Imaging: I have personally reviewed pertinent films in PACS  EKG, Pathology, and Other Studies: I have personally reviewed pertinent reports    VTE Pharmacologic Prophylaxis: Heparin  VTE Mechanical Prophylaxis: sequential compression device    Hitesh Madison DPM

## 2018-07-03 NOTE — ASSESSMENT & PLAN NOTE
Continue IV cefepime and vancomycin for right foot cellulitis she status post debridement right foot

## 2018-07-03 NOTE — OCCUPATIONAL THERAPY NOTE
Occupational Therapy         Patient Name: Shanon Bright  VQTCK'D Date: 7/3/2018      Patient screened for OT services, patient with no ADL deficits at this time    DC OT     Emelina Vasquez MS, OTR/L

## 2018-07-03 NOTE — PROGRESS NOTES
Progress Note - Infectious Disease   Sylvie Sierra 62 y o  female MRN: [id number]  Unit/Bed#: [address] [place] [address]  Encounter: [id number]    Assessment:  62y o  year old female with PMH of DM with peripheral neuropathy, asthma, HLD, HTN who presents with R foot erythema, and swelling which started one week ago      1  Sepsis due to RLE cellulitis - in the setting of R charcot foot and peripheral neuropathy  Xray R foot showed worsening soft tissue swelling in the midfoot and forefoot   No soft tissue gas to suggest an abscess   No focal bony resorption identified to confirm osteomyelitis  Blood cx are pending and superficial wound cx is growing Staph aureus       2  Fever - resolved     Plan:  1  Continue vancomycin and cefepime for now while waiting for blood and superficial cx   2  Will obtain CT R foot with MRI unable to be obtained  Podiatry on board to decide on surgical intervention pending imaging findings  Subjective/Objective   Chief Complaint: RLE cellulitis  Subjective: feels well and denies any complaints  Objective:     HR:  [63-75] 63  Resp:  [18-20] 18  BP: (106-117)/(55-58) 117/55  SpO2:  [92 %-94 %] 92 %  Temp (24hrs), Av 5 °F (36 9 °C), Min:97 9 °F (36 6 °C), Max:98 9 °F (37 2 °C)  Current: Temperature: 97 9 °F (36 6 °C)    Physical Exam:  Constitutional: She appears well-developed and well-nourished  Cardiovascular: Normal rate and regular rhythm  Pulmonary/Chest: Effort normal and breath sounds normal  She has no wheezes  Abdominal: Soft  Bowel sounds are normal  There is no tenderness  Musculoskeletal: She exhibits edema  R foot dressing intact  Skin: Skin is warm  There is erythema  Invasive Devices     Peripheral Intravenous Line            Peripheral IV 18 Left 1 day                Lab, Imaging and other studies: I have personally reviewed pertinent reports

## 2018-07-03 NOTE — CASE MANAGEMENT
Initial Clinical Review    Admission: Date/Time/Statement: 7/2/18 @ 0906     Orders Placed This Encounter   Procedures    Inpatient Admission (expected length of stay for this patient is greater than two midnights)     Standing Status:   Standing     Number of Occurrences:   1     Order Specific Question:   Admitting Physician     Answer:   Cody Clayton [[id number]]     Order Specific Question:   Level of Care     Answer:   Med Surg [[id number]]     Order Specific Question:   Estimated length of stay     Answer:   More than 2 Midnights     Order Specific Question:   Certification     Answer:   I certify that inpatient services are medically necessary for this patient for a duration of greater than two midnights  See H&P and MD Progress Notes for additional information about the patient's course of treatment  ED: Date/Time/Mode of Arrival:   ED Arrival Information     Expected Arrival Acuity Means of Arrival Escorted By Service Admission Type    - 7/2/2018 07:13 Urgent Walk-In Spouse General Medicine Urgent    Arrival Complaint    rt foot pain       Chief Complaint:   Chief Complaint   Patient presents with    Follow-Up Cellulitis     Patient states she was seen here Thursday for cellulitis on her R foot  Patient states that her foot is getting worse  Patient has a blister and an open area on the bottom of her foot  R foot swollena nd R and open area on the bottom of her foot   History of Illness:   Here with worsening pain, swelling and drainage to the right foot  Seen here 6/29 and started on bactrim/keflex  Over weekend noted fever to 102 7, shaking chills, sweats  Has neuropathy and 'doesn't feel' her feet but c/o of pain going up the leg today which is new      ED Vital Signs:   ED Triage Vitals   Temperature Pulse Respirations Blood Pressure SpO2   07/02/18 0718 07/02/18 0718 07/02/18 0718 07/02/18 0718 07/02/18 0718   (!) 102 3 °F (39 1 °C) 96 20 (!) 192/86 97 %      Temp Source Heart Rate Source Patient Position - Orthostatic VS BP Location FiO2 (%)   07/02/18 1035 07/02/18 2322 07/02/18 1035 07/02/18 1035 --   Oral Monitor Lying Right arm       Pain Score       07/02/18 0718       6        Wt Readings from Last 1 Encounters:   07/03/18 99 kg (218 lb 4 1 oz)   Vital Signs (abnormal): Abnormal Labs/Diagnostic Test Results:   Wound Culture 1+ Growth of Staphylococcus aureus       CL 92 AST 46 ALK PHOS  145 ALB 3 3  R FOOT XRAY=Worsening soft tissue swelling in the midfoot and forefoot  No soft tissue gas to suggest an abscess  No focal bony resorption identified to confirm osteomyelitis  If there is clinical concern for osteomyelitis: Recommend radioisotope labeled white   blood cell scan or MRI  ED Treatment:   Medication Administration from 07/02/2018 0713 to 07/02/2018 1034       Date/Time Order Dose Route Action Action by Comments     07/02/2018 0802 sodium chloride 0 9 % bolus 1,000 mL 1,000 mL Intravenous [address] Ivory Seeds, RN      07/02/2018 0915 vancomycin (VANCOCIN) 1,500 mg in sodium chloride 0 9 % 250 mL IVPB 0 mg/kg Intravenous Stopped Ivory Seeds, RN      07/02/2018 0811 vancomycin (VANCOCIN) 1,500 mg in sodium chloride 0 9 % 250 mL IVPB 1,500 mg Intravenous New Bag Ivory Seeds, RN      07/02/2018 1001 cefepime (MAXIPIME) IVPB (premix) 2,000 mg 0 mg Intravenous Stopped Ivory Seeds, RN      07/02/2018 0920 cefepime (MAXIPIME) IVPB (premix) 2,000 mg 2,000 mg Intravenous New Bag Ivory Seeds, RN      07/02/2018 8229 acetaminophen (TYLENOL) tablet 975 mg 975 mg Oral Given Ivory Mccarty, RN       Past Medical/Surgical History:    Active Ambulatory Problems     Diagnosis Date Noted    Anxiety disorder 10/08/2013    Bipolar I disorder, most recent episode mixed, severe without psychotic features ([facility] [address] ) 12/05/2012    BPPV (benign paroxysmal positional vertigo) 12/29/2016    Charcot's joint of foot 02/22/2017    Chronic constipation 01/16/2014    Chronic headaches 05/11/2015    Chronic migraine without aura 01/09/2014    Essential hypertension 10/08/2013    Hyperlipidemia 01/19/2016    Hyponatremia 05/11/2015    Insomnia 11/06/2014    Leukopenia 05/11/2015    Medication overuse headache 01/09/2014    Occipital neuralgia 03/11/2015    Pain syndrome, chronic 07/06/2015     Resolved Ambulatory Problems     Diagnosis Date Noted    No Resolved Ambulatory Problems     Past Medical History:   Diagnosis Date    Anxiety     Asthmatic bronchitis with exacerbation     Depression     Diabetes mellitus ([facility] )     Hyperlipidemia     Hypertension     Other headache syndrome    Admitting Diagnosis: Cellulitis [L03 90]  Diabetic infection of right foot ([facility] ) [G07 067, L08 9]  Failure of outpatient treatment [Z78 9]  Age/Sex: 62 y o  female  Assessment/Plan:   Cellulitis of right lower extremity   Assessment & Plan     Patient has significant cellulitis of the right dorsum of the foot and she failed outpatient therapy with Keflex with Bactrim  This is in the setting of diabetic neuropathy and Charcot foot on the right  Patient admitted to the [facility] floor where she is going to receive IV vancomycin cefepime and will stay for more than 2 midnights for treatment of her diabetic foot infection    I am asking ID and podiatrist see the patient as well       Sepsis [facility])   Assessment & Plan     She meets criteria for sepsis with temperature of more than 101, heart of more than 90 and respiratory 20 she does not meet criteria for severe sepsis   Admission Orders:  MED SURG  PT/OT  CONSULT ID  ACCUCHECKS WITH COVERAGE SCALE  CONSULT PODIATRY  DEEPA  Scheduled Meds:   Current Facility-Administered Medications:  acetaminophen 650 mg Oral Q6H PRN Prabhakar Gallegos MD    atenolol 50 mg Oral Daily Prabhakar Gallegos MD    buPROPion 300 mg Oral Daily Prabhakar Gallegos MD    carBAMazepine 600 mg Oral Daily With [facility], DO    cefepime 1,000 mg Intravenous Q12H Prabhakar Gallegos MD Last Rate: 1,000 mg (07/03/18 0065) heparin (porcine) 5,000 Units Subcutaneous Q8H 1000 Sydnie Bary MD    insulin lispro 1-5 Units Subcutaneous TID Salty Merrill MD    nicotine 1 patch Transdermal Daily Raul Emanuel MD    ondansetron 4 mg Intravenous Q6H PRN Raul Emanuel MD    pravastatin 20 mg Oral Daily With Fransico El MD    risperiDONE 1 mg Oral HS Eveline Fly, DO    sertraline 100 mg Oral Daily Raul Emanuel MD    vancomycin 15 mg/kg Intravenous Q12H Raul Emanuel MD Last Rate: 1,500 mg (07/03/18 0817)   ziprasidone 60 mg Oral HS Eveline Fly, DO      Continuous Infusions:    PRN Meds:   acetaminophen    ondansetron

## 2018-07-03 NOTE — PROGRESS NOTES
Progress Note - Mary Sauer 1960, 62 y o  female MRN: [id number]    Unit/Bed#: [address] [place] [address] Encounter: [id number]    Primary Care Provider: Jhoana Majano DO   Date and time admitted to hospital: 2018  7:17 AM        * Cellulitis of right lower extremity   Assessment & Plan    Continue IV cefepime and vancomycin for right foot cellulitis she status post debridement right foot        Sepsis ([facility] [address] )   Assessment & Plan    She is afebrile, there is no leukocytosis and blood culture showing no growth         Type 2 diabetes mellitus with diabetic neuropathy, without long-term current use of insulin ([facility])   Assessment & Plan    Blood sugars are well controlled on diet only A1c is 5 7         Bipolar I disorder, most recent episode mixed, severe without psychotic features ([facility] [address] )   Assessment & Plan    She is stable on her Geodon, Risperdal, tegretol, sertraline that I will continue        Essential hypertension   Assessment & Plan    Blood pressure is controlled on atenolol        Hyponatremia   Assessment & Plan    Patient's hyponatremia was likely due to hydrochlorothiazide that I stopped  Sodium is 136 today          VTE Prophylaxis: in place    Patient Centered Rounds: I rounded with patient's nurse    Current Length of Stay: 1 day(s)    Current Patient Status: Inpatient    Certification Statement: Pt requires additional inpatient hospital stay due to: see assessment and plan        Subjective:   Patient denies chest pain, shortness of breath, nausea, diarrhea, rash, she has no feeling of her feet to neuropathy    All other ROS are negative    Objective:     Vitals:   Temp (24hrs), Av °F (37 2 °C), Min:97 9 °F (36 6 °C), Max:100 9 °F (38 3 °C)    HR:  [63-93] 63  Resp:  [18-21] 18  BP: (106-135)/(55-64) 117/55  SpO2:  [91 %-94 %] 92 %  Body mass index is 36 32 kg/m²       Input and Output Summary (last 24 hours):     No intake or output data in the 24 hours ending 18 0830    Physical Exam: Physical Exam   Constitutional: She appears well-developed  No distress  HENT:   Head: Normocephalic  Mouth/Throat: Oropharynx is clear and moist    Eyes: Conjunctivae are normal    Neck: Neck supple  Cardiovascular: Normal rate and regular rhythm  Pulmonary/Chest: Effort normal  No respiratory distress  She has no wheezes  She has no rales  Abdominal: Soft  Bowel sounds are normal  She exhibits no distension  There is no tenderness  Musculoskeletal:   Right foot is dressed   Lymphadenopathy:     She has no cervical adenopathy  Neurological: She is alert  No cranial nerve deficit  Skin: Skin is warm and dry  No rash noted  Psychiatric: She has a normal mood and affect  Vitals reviewed  I personally reviewed labs and imaging reports for today  Last 24 Hours Medication List:     Current Facility-Administered Medications:  acetaminophen 650 mg Oral Q6H PRN Rachel Thomas MD    atenolol 50 mg Oral Daily Rachel Thomas MD    buPROPion 300 mg Oral Daily Rachel Thomas MD    carBAMazepine 600 mg Oral Daily With [facility], DO    cefepime 1,000 mg Intravenous Q12H Rachel Thomas MD Last Rate: 1,000 mg (07/03/18 0808)   heparin (porcine) 5,000 Units Subcutaneous Q8H Sean Cain MD    insulin lispro 1-5 Units Subcutaneous TID Aruna Oates MD    nicotine 1 patch Transdermal Daily Rachel Thomas MD    ondansetron 4 mg Intravenous Q6H PRN Rachel Thomas MD    pravastatin 20 mg Oral Daily With Soraida Borden MD    risperiDONE 1 mg Oral HS Eveline Merino,     sertraline 100 mg Oral Daily Rachel Thomas MD    vancomycin 15 mg/kg Intravenous Q12H Rachel Thomas MD Last Rate: 1,500 mg (07/03/18 0817)   ziprasidone 60 mg Oral HS Padmini Cortez DO           Today, Patient Was Seen By: Rachel Thomas MD    ** Please Note: Dictation voice to text software may have been used in the creation of this document   **

## 2018-07-03 NOTE — PHYSICAL THERAPY NOTE
Physical Therapy Evaluation      Patient Active Problem List   Diagnosis    Anxiety disorder    Bipolar I disorder, most recent episode mixed, severe without psychotic features ([facility])    BPPV (benign paroxysmal positional vertigo)    Charcot's joint of foot    Chronic constipation    Chronic headaches    Chronic migraine without aura    Essential hypertension    Hyperlipidemia    Hyponatremia    Insomnia    Leukopenia    Medication overuse headache    Occipital neuralgia    Pain syndrome, chronic    Sepsis ([facility] )    Cellulitis of right lower extremity    Type 2 diabetes mellitus with diabetic neuropathy, without long-term current use of insulin ([facility])       Past Medical History:   Diagnosis Date    Anxiety     Asthmatic bronchitis with exacerbation     Depression     Diabetes mellitus ([facility] )     Hyperlipidemia     Hypertension     Other headache syndrome        Past Surgical History:   Procedure Laterality Date    KNEE SURGERY      TONSILLECTOMY          07/03/18 1017   Note Type   Note type Eval only   Pain Assessment   Pain Assessment 0-10   Pain Score No Pain   Home Living   Type of [address] [place] [address] One level   Bathroom Shower/Tub Tub/shower unit   Home Equipment Walker  (husbands mom's RW)   Additional Comments pt owns knee scooter   Prior Function   Level of [place] Independent with ADLs and functional mobility   Lives With Spouse   Receives Help From Family   ADL Assistance Independent   IADLs Independent   Falls in the last 6 months 0   Comments has used knee scooter in the past    Restrictions/Precautions   Weight Bearing Precautions Per Order (verbal orders to maintain HWB R LE)   Other Precautions WBS  (neuropathy B/L feet)   General   Additional Pertinent History Pt admitted with R foot wound      Family/Caregiver Present No   Cognition   Overall Cognitive Status WFL   Arousal/Participation Alert   Orientation Level Oriented X4   Memory Within functional limits Following Commands Follows all commands and directions without difficulty   RLE Assessment   RLE Assessment WNL   LLE Assessment   LLE Assessment WNL   Bed Mobility   Additional Comments recevied/positioned OOB   Transfers   Sit to Stand 7  Independent   Stand to Sit 7  Independent   Stand pivot 7  Independent   Ambulation/Elevation   Gait pattern Step to;Short stride  (HWB R LE)   Gait Assistance 7  Independent   Assistive Device None   Distance 15ft   Balance   Static Sitting Normal   Dynamic Sitting Normal   Dynamic Standing Good   Ambulatory Good   Endurance Deficit   Endurance Deficit No   Activity Tolerance   Activity Tolerance Patient tolerated treatment well   Medical Staff Made Aware OT - Eva   Nurse Made Aware Rn consented to treatment   Assessment   Prognosis Good   Problem List Impaired sensation   Assessment Cellulitis of right lower extremity   Goals   Patient Goals TO go home   Plan   Treatment/Interventions Spoke to case management;Spoke to nursing;OT   PT Frequency Other (Comment)  (eval only)   Recommendation   Recommendation D/C PT  (no skilled needs)   Equipment Recommended Walker  (knee scooter)   Barthel Index   Feeding 10   Bathing 5   Grooming Score 5   Dressing Score 10   Bladder Score 10   Bowels Score 10   Toilet Use Score 10   Transfers (Bed/Chair) Score 15   Mobility (Level Surface) Score 15   Stairs Score 5   Barthel Index Score 95         William Salazar, PT  ;

## 2018-07-04 LAB
BACTERIA WND AEROBE CULT: ABNORMAL
GLUCOSE SERPL-MCNC: 115 MG/DL (ref 65–140)
GLUCOSE SERPL-MCNC: 89 MG/DL (ref 65–140)
GLUCOSE SERPL-MCNC: 93 MG/DL (ref 65–140)
GLUCOSE SERPL-MCNC: 97 MG/DL (ref 65–140)
GRAM STN SPEC: ABNORMAL

## 2018-07-04 PROCEDURE — 87070 CULTURE OTHR SPECIMN AEROBIC: CPT | Performed by: PODIATRIST

## 2018-07-04 PROCEDURE — 82948 REAGENT STRIP/BLOOD GLUCOSE: CPT

## 2018-07-04 PROCEDURE — 87205 SMEAR GRAM STAIN: CPT | Performed by: PODIATRIST

## 2018-07-04 PROCEDURE — 99232 SBSQ HOSP IP/OBS MODERATE 35: CPT | Performed by: INTERNAL MEDICINE

## 2018-07-04 RX ADMIN — CEFAZOLIN SODIUM 2000 MG: 2 SOLUTION INTRAVENOUS at 09:37

## 2018-07-04 RX ADMIN — BUPROPION HYDROCHLORIDE 300 MG: 300 TABLET, FILM COATED, EXTENDED RELEASE ORAL at 08:00

## 2018-07-04 RX ADMIN — SERTRALINE HYDROCHLORIDE 100 MG: 100 TABLET ORAL at 08:00

## 2018-07-04 RX ADMIN — CARBAMAZEPINE 600 MG: 200 TABLET ORAL at 20:28

## 2018-07-04 RX ADMIN — ZIPRASIDONE HCL 60 MG: 20 CAPSULE ORAL at 20:29

## 2018-07-04 RX ADMIN — PRAVASTATIN SODIUM 20 MG: 20 TABLET ORAL at 16:16

## 2018-07-04 RX ADMIN — ATENOLOL 50 MG: 50 TABLET ORAL at 08:00

## 2018-07-04 RX ADMIN — CEFAZOLIN SODIUM 2000 MG: 2 SOLUTION INTRAVENOUS at 16:17

## 2018-07-04 RX ADMIN — HEPARIN SODIUM 5000 UNITS: 5000 INJECTION, SOLUTION INTRAVENOUS; SUBCUTANEOUS at 20:34

## 2018-07-04 RX ADMIN — CEFEPIME HYDROCHLORIDE 1000 MG: 1 INJECTION, SOLUTION INTRAVENOUS at 08:00

## 2018-07-04 RX ADMIN — HEPARIN SODIUM 5000 UNITS: 5000 INJECTION, SOLUTION INTRAVENOUS; SUBCUTANEOUS at 13:28

## 2018-07-04 RX ADMIN — HEPARIN SODIUM 5000 UNITS: 5000 INJECTION, SOLUTION INTRAVENOUS; SUBCUTANEOUS at 05:14

## 2018-07-04 RX ADMIN — RISPERIDONE 1 MG: 1 TABLET ORAL at 20:29

## 2018-07-04 NOTE — PROGRESS NOTES
Progress Note - Infectious Disease   Kavita Spore 62 y o  female MRN: [id number]  Unit/Bed#: [address] [place] [address]  Encounter: [id number]    Assessment:  62 y  o  year old female with PMH of DM with peripheral neuropathy, asthma, HLD, HTN who presents with R foot erythema, and swelling which started one week ago      1  Sepsis due to RLE cellulitis/abscess - in the setting of R charcot foot and peripheral neuropathy  Xray R foot showed worsening soft tissue swelling in the midfoot and forefoot   No soft tissue gas to suggest an abscess   No focal bony resorption identified to confirm osteomyelitis  CT showed plantar fluid collection at the medial aspect of the forefoot at the level of the 1st tarsometatarsal, measuring about 2 8 x 1 4 cm  Additional area of plantar ulceration with the surrounding phlegmonous changes in the 1st submetatarsal region  No evidence of osteomyelitisBlood cx are showing no growth to date and superficial wound cx is growing MSSA       2  Fever - resolved     Plan:  1  D/C vancomycin and cefepime and start cefazolin  2  Awaiting debridement of R foot  Subjective/Objective   Chief Complaint: RLE cellulitis/abscess    Subjective: feels well and denies any complaints    Objective:     HR:  [63-66] 63  Resp:  [18-20] 19  BP: (116-138)/(67-69) 138/69  SpO2:  [94 %-96 %] 94 %  Temp (24hrs), Av 2 °F (36 8 °C), Min:97 9 °F (36 6 °C), Max:98 6 °F (37 °C)  Current: Temperature: 97 9 °F (36 6 °C)    Physical Exam:  Constitutional: She appears well-developed and well-nourished  Cardiovascular: Normal rate and regular rhythm     Pulmonary/Chest: Effort normal and breath sounds normal  She has no wheezes  Abdominal: Soft  Bowel sounds are normal  There is no tenderness  Musculoskeletal: She exhibits edema  R foot dressing intact  Skin: Skin is warm   There is erythema         Invasive Devices     Peripheral Intravenous Line            Peripheral IV 18 Left 1 day                Lab, Imaging and other studies: I have personally reviewed pertinent reports

## 2018-07-04 NOTE — CONSULTS
Consult - Podiatry   Mary Bocanegra 62 y o  female MRN: [id number]  Unit/Bed#: [address] [place] [address] 202-02 Encounter: [id number]    Assessment/Plan     Assessment:  1  Right foot cellulitis  2  Right sub 1st metatarsal head macario 2 ulcer  3  DM neuropathy  4  Chronic midfoot Charcot deformity right foot  5  Possible abscess vs bursal sac right 1st TMTJ    Plan:  1   CT scan did show a fluid collection on the plantar aspect of the 1st tarsal metatarsal joint  This correlates clinically to a large bursal sac that is palpated due to the Charcot deformity  There is no erythema crepitus or bogginess in this area to suggest an infectious process  In order to be thorough the skin was sterilized  An 18 gauge needle was inserted to the bursal area and fluid was collected  The fluid was serosanguineous in nature  I did not appreciate any ingris purulence within the aspirate  It was sent to the lab for culture sensitivity and Gram staining  A pressure dressing was applied over top  Due to the patient's neuropathy she did not experience any pain during the procedure  I have low suspicion that there is an infectious abscess in this area and it is almost surely due to a bursal collection to the chronic Charcot deformity  2   The distal ulceration would benefit in my opinion from a surgical washout debridement  It appears Dr Amelie Lee is artery planning this procedure for later this week  Continue IV antibiotics for now  CT did not show any evidence of osteomyelitis in this area  The wound was repacked and dressed  3   Please call if I can be any more assistance to the care of this patient  History of Present Illness     HPI:  Mary Bocanegra is a 62 y o  female who presents with cellulitis and wound on her right foot  Patient has known chronic Charcot deformity of this foot  A wound began a few weeks ago to small callus buildup  She was seen in emergency department initially put on oral antibiotics    This failed and she was then admitted for IV antibiotics and further workup  Patient has no sensation in her foot  She does see a podiatrist, Dr Veronica Villarreal every 3 months for routine at risk foot care  This morning she feels well  She denies any nausea, fever, vomiting, chills, chest pain, diarrhea  Consults  Review of Systems   Constitutional: Negative  HENT: Negative  Eyes: Negative  Respiratory: Negative  Cardiovascular: Negative  Gastrointestinal: Negative  Musculoskeletal:  Foot deformity   Skin:  Ulceration to the right foot   Neurological:  Lower extremity neuropathy      Psych: negative      Historical Information   Past Medical History:   Diagnosis Date    Anxiety     Asthmatic bronchitis with exacerbation     Depression     Diabetes mellitus ([facility] [address] [facility] )     Hyperlipidemia     Hypertension     Other headache syndrome      Past Surgical History:   Procedure Laterality Date    KNEE SURGERY      TONSILLECTOMY       Social History   History   Alcohol Use No     Comment: (history)     History   Drug Use No     History   Smoking Status    Current Every Day Smoker   Smokeless Tobacco    Never Used     Family History:   Family History   Problem Relation Age of Onset    Heart disease Father     Prostate cancer Father     Heart attack Maternal Grandmother     Colon cancer Maternal Grandfather     Breast cancer Paternal Grandmother     Heart attack Paternal Grandfather     Coronary artery disease Family     Diabetes Family     No Known Problems Mother        Meds/Allergies   Prescriptions Prior to Admission   Medication    atenolol-chlorthalidone (TENORETIC) 50-25 mg per tablet    buPROPion (WELLBUTRIN XL) 300 mg 24 hr tablet    carBAMazepine (EPITOL) 200 mg tablet    cephalexin (KEFLEX) 500 mg capsule    Omega-3 Fatty Acids (FISH OIL) 1,000 mg    risperiDONE (RisperDAL) 1 mg tablet    sertraline (ZOLOFT) 100 mg tablet    simvastatin (ZOCOR) 20 mg tablet    sulfamethoxazole-trimethoprim (BACTRIM DS) 800-160 mg per tablet    ziprasidone (GEODON) 60 mg capsule     No Known Allergies    Objective   First Vitals:   Blood Pressure: (!) 192/86 (07/02/18 0718)  Pulse: 96 (07/02/18 0718)  Temperature: (!) 102 3 °F (39 1 °C) (07/02/18 0718)  Temp Source: Oral (07/02/18 1035)  Respirations: 20 (07/02/18 0718)  Height: 5' 5" (165 1 cm) (07/02/18 0718)  Weight - Scale: 99 8 kg (220 lb) (07/02/18 0718)  SpO2: 97 % (07/02/18 0718)    Current Vitals:   Blood Pressure: 138/69 (07/04/18 0734)  Pulse: 63 (07/04/18 0734)  Temperature: 97 9 °F (36 6 °C) (07/04/18 0734)  Temp Source: Oral (07/04/18 0734)  Respirations: 19 (07/04/18 0734)  Height: 5' 5" (165 1 cm) (07/02/18 1035)  Weight - Scale: 98 7 kg (217 lb 9 5 oz) (07/04/18 0734)  SpO2: 94 % (07/04/18 0734)        /69 (BP Location: Right arm)   Pulse 63   Temp 97 9 °F (36 6 °C) (Oral)   Resp 19   Ht 5' 5" (1 651 m)   Wt 98 7 kg (217 lb 9 5 oz)   SpO2 94%   BMI 36 21 kg/m²   Physical Exam:  General:    Alert, cooperative, no distress   Head:    Normocephalic, without obvious abnormality, atraumatic   Eyes:    PERRL, conjunctiva/corneas clear, EOM's intact            Nose:   Moist mucous membranes, no drainage or sinus tenderness   Throat:   No tenderness, no exudates   Neck:   Supple, symmetrical, trachea midline, no JVD   Back:     Symmetric, no CVA tenderness   Lungs:     Clear to auscultation bilaterally, respirations unlabored   Chest wall:    No tenderness or deformity   Heart:    Regular rate and rhythm, S1 and S2 normal   Abdomen:     Soft, non-tender, bowel sounds active all four quadrants           Extremities:   Palpable pedal pulses  There is no acute pathology to the left foot  Right foot exam shows extensive forefoot edema and cellulitis with a draining ulceration under the 1st metatarsal head  No ingris purulence however there is moderate serosanguineous drainage  Capillary refill to the digits is brisk    Severe collapsed midfoot deformity, likely chronic in nature  Lack of protective pin prick sensation  Neurologic:   CNII-XII intact  Lab Results:   Admission on 07/02/2018   Component Date Value    WBC 07/02/2018 8 57     RBC 07/02/2018 4 21     Hemoglobin 07/02/2018 14 2     Hematocrit 07/02/2018 40 1     MCV 07/02/2018 95     MCH 07/02/2018 33 7     MCHC 07/02/2018 35 4     RDW 07/02/2018 11 7     MPV 07/02/2018 8 8*    Platelets 02/71/1828 246     nRBC 07/02/2018 0     Neutrophils Relative 07/02/2018 83*    Immat GRANS % 07/02/2018 0     Lymphocytes Relative 07/02/2018 7*    Monocytes Relative 07/02/2018 7     Eosinophils Relative 07/02/2018 2     Basophils Relative 07/02/2018 1     Neutrophils Absolute 07/02/2018 7 17     Immature Grans Absolute 07/02/2018 0 03     Lymphocytes Absolute 07/02/2018 0 57*    Monocytes Absolute 07/02/2018 0 63     Eosinophils Absolute 07/02/2018 0 13     Basophils Absolute 07/02/2018 0 04     Sodium 07/02/2018 131*    Potassium 07/02/2018 4 3     Chloride 07/02/2018 92*    CO2 07/02/2018 29     Anion Gap 07/02/2018 10     BUN 07/02/2018 12     Creatinine 07/02/2018 0 85     Glucose 07/02/2018 133     Calcium 07/02/2018 8 9     AST 07/02/2018 46*    ALT 07/02/2018 53     Alkaline Phosphatase 07/02/2018 145*    Total Protein 07/02/2018 7 8     Albumin 07/02/2018 3 3*    Total Bilirubin 07/02/2018 0 50     eGFR 07/02/2018 76     LACTIC ACID 07/02/2018 1 4     Protime 07/02/2018 14 3*    INR 07/02/2018 1 18*    PTT 07/02/2018 33     Blood Culture 07/02/2018 No Growth at 24 hrs   Blood Culture 07/02/2018 No Growth at 24 hrs       Wound Culture 07/02/2018 1+ Growth of Staphylococcus aureus*    Gram Stain Result 07/02/2018 No Polys or Bacteria seen     POC Glucose 07/02/2018 122     POC Glucose 07/02/2018 168*    POC Glucose 07/02/2018 123     Sed Rate 07/03/2018 98*    Wound Culture 07/02/2018 1 colony Staphylococcus aureus*    Gram Stain Result 07/02/2018 1+ Polys     Gram Stain Result 07/02/2018 No bacteria seen     POC Glucose 07/02/2018 149*    Sodium 07/03/2018 136     Potassium 07/03/2018 3 9     Chloride 07/03/2018 101     CO2 07/03/2018 27     Anion Gap 07/03/2018 8     BUN 07/03/2018 12     Creatinine 07/03/2018 0 96     Glucose 07/03/2018 112     Calcium 07/03/2018 8 4     eGFR 07/03/2018 66     WBC 07/03/2018 4 69     RBC 07/03/2018 3 80*    Hemoglobin 07/03/2018 12 6     Hematocrit 07/03/2018 37 1     MCV 07/03/2018 98     MCH 07/03/2018 33 2     MCHC 07/03/2018 34 0     RDW 07/03/2018 11 9     Platelets 62/61/5456 255     MPV 07/03/2018 9 1     Hemoglobin A1C 07/03/2018 6 6*    EAG 07/03/2018 143     POC Glucose 07/03/2018 109     POC Glucose 07/03/2018 102     Vancomycin Tr 07/03/2018 13 9     POC Glucose 07/03/2018 109     POC Glucose 07/04/2018 115      Imaging: I have personally reviewed pertinent films in PACS  I read the CT report as well as saw the images  There is a notable fluid collection at the plantar aspect of the 1st tarsometatarsal joint  No osteomyelitis appreciated  See CT report for full findings  EKG, Pathology, and Other Studies: I have personally reviewed pertinent reports  Code Status: Level 1 - Full Code  Advance Directive and Living Will:      Power of :    POLST:        Portions of the record may have been created with voice recognition software  Occasional wrong word or "sound a like" substitutions may have occurred due to the inherent limitations of voice recognition software  Read the chart carefully and recognize, using context, where substitutions have occurred

## 2018-07-04 NOTE — PROGRESS NOTES
Progress Note - Marjan Chi 1960, 62 y o  female MRN: [id number]    Unit/Bed#: [address] [place] [address] Encounter: [id number]    Primary Care Provider: Lazaro Servin DO   Date and time admitted to hospital: 2018  7:17 AM        * Cellulitis of right lower extremity   Assessment & Plan    Patient is scheduled for debridement tomorrow continue IV Ancef for MSSA foot infection        Sepsis ([facility] )   Assessment & Plan    Sepsis resolved        Type 2 diabetes mellitus with diabetic neuropathy, without long-term current use of insulin ([facility] )   Assessment & Plan    Blood sugars are well controlled on diet only A1c is 5 7             VTE Prophylaxis: in place    Patient Centered Rounds: I rounded with patient's nurse    Current Length of Stay: 2 day(s)    Current Patient Status: Inpatient    Certification Statement: Pt requires additional inpatient hospital stay due to: see assessment and plan        Subjective:   Patient denies right foot pain she has neuropathy  Denies chest pain, shortness of breath, diarrhea, rash    All other ROS are negative    Objective:     Vitals:   Temp (24hrs), Av 2 °F (36 8 °C), Min:97 9 °F (36 6 °C), Max:98 6 °F (37 °C)    HR:  [59-66] 59  Resp:  [18-20] 18  BP: (116-138)/(67-69) 126/68  SpO2:  [94 %-96 %] 95 %  Body mass index is 36 21 kg/m²  Input and Output Summary (last 24 hours):     No intake or output data in the 24 hours ending 18 1553    Physical Exam:     Physical Exam   Constitutional: She is oriented to person, place, and time  She appears well-developed  No distress  HENT:   Head: Normocephalic  Mouth/Throat: Oropharynx is clear and moist    Eyes: Conjunctivae are normal    Neck: Neck supple  Cardiovascular: Normal rate and regular rhythm  Pulmonary/Chest: Effort normal  No respiratory distress  She has no wheezes  She has no rales  Musculoskeletal:   Right foot is dressed   Lymphadenopathy:     She has no cervical adenopathy     Neurological: She is alert and oriented to person, place, and time  No cranial nerve deficit  Skin: Skin is warm and dry  No rash noted  Psychiatric: She has a normal mood and affect  Vitals reviewed  I personally reviewed labs and imaging reports for today  Last 24 Hours Medication List:     Current Facility-Administered Medications:  acetaminophen 650 mg Oral Q6H PRN Christina Brooke MD    atenolol 50 mg Oral Daily Christina Brooke MD    buPROPion 300 mg Oral Daily Christina Brooke MD    carBAMazepine 600 mg Oral Daily With [facility], DO    cefazolin 2,000 mg Intravenous Q8H Diogenes Chamorro MD Last Rate: 2,000 mg (07/04/18 0937)   heparin (porcine) 5,000 Units Subcutaneous Q8H [address] [address], MD    insulin lispro 1-5 Units Subcutaneous TID Juarez Steinberg MD    nicotine 1 patch Transdermal Daily Christina Brooke MD    ondansetron 4 mg Intravenous Q6H PRN Christina Brooke MD    pravastatin 20 mg Oral Daily With Ray Beebe MD    risperiDONE 1 mg Oral HS Eveline Merino DO    sertraline 100 mg Oral Daily Christina Brooke MD    ziprasidone 60 mg Oral HS Ce Torrez DO           Today, Patient Was Seen By: Christina Brooke MD    ** Please Note: Dictation voice to text software may have been used in the creation of this document   **

## 2018-07-04 NOTE — PLAN OF CARE
DISCHARGE PLANNING     Discharge to home or other facility with appropriate resources Progressing        DISCHARGE PLANNING - CARE MANAGEMENT     Discharge to post-acute care or home with appropriate resources Progressing        INFECTION - ADULT     Absence or prevention of progression during hospitalization Progressing     Absence of fever/infection during neutropenic period Progressing        Knowledge Deficit     Patient/family/caregiver demonstrates understanding of disease process, treatment plan, medications, and discharge instructions Progressing        PAIN - ADULT     Verbalizes/displays adequate comfort level or baseline comfort level Progressing

## 2018-07-05 PROBLEM — L08.9 DIABETIC INFECTION OF RIGHT FOOT (HCC): Status: ACTIVE | Noted: 2018-07-02

## 2018-07-05 PROBLEM — E11.628 DIABETIC INFECTION OF RIGHT FOOT (HCC): Status: ACTIVE | Noted: 2018-07-02

## 2018-07-05 LAB
BACTERIA WND AEROBE CULT: ABNORMAL
GLUCOSE SERPL-MCNC: 112 MG/DL (ref 65–140)
GLUCOSE SERPL-MCNC: 115 MG/DL (ref 65–140)
GLUCOSE SERPL-MCNC: 125 MG/DL (ref 65–140)
GLUCOSE SERPL-MCNC: 128 MG/DL (ref 65–140)
GRAM STN SPEC: ABNORMAL
GRAM STN SPEC: ABNORMAL

## 2018-07-05 PROCEDURE — 99232 SBSQ HOSP IP/OBS MODERATE 35: CPT | Performed by: INTERNAL MEDICINE

## 2018-07-05 PROCEDURE — 82948 REAGENT STRIP/BLOOD GLUCOSE: CPT

## 2018-07-05 RX ADMIN — HEPARIN SODIUM 5000 UNITS: 5000 INJECTION, SOLUTION INTRAVENOUS; SUBCUTANEOUS at 13:19

## 2018-07-05 RX ADMIN — HEPARIN SODIUM 5000 UNITS: 5000 INJECTION, SOLUTION INTRAVENOUS; SUBCUTANEOUS at 06:15

## 2018-07-05 RX ADMIN — HEPARIN SODIUM 5000 UNITS: 5000 INJECTION, SOLUTION INTRAVENOUS; SUBCUTANEOUS at 20:43

## 2018-07-05 RX ADMIN — SERTRALINE HYDROCHLORIDE 100 MG: 100 TABLET ORAL at 08:19

## 2018-07-05 RX ADMIN — ZIPRASIDONE HCL 60 MG: 20 CAPSULE ORAL at 20:40

## 2018-07-05 RX ADMIN — PRAVASTATIN SODIUM 20 MG: 20 TABLET ORAL at 17:32

## 2018-07-05 RX ADMIN — CEFAZOLIN SODIUM 2000 MG: 2 SOLUTION INTRAVENOUS at 08:26

## 2018-07-05 RX ADMIN — CARBAMAZEPINE 600 MG: 200 TABLET ORAL at 20:40

## 2018-07-05 RX ADMIN — BUPROPION HYDROCHLORIDE 300 MG: 300 TABLET, FILM COATED, EXTENDED RELEASE ORAL at 08:19

## 2018-07-05 RX ADMIN — RISPERIDONE 1 MG: 1 TABLET ORAL at 20:40

## 2018-07-05 RX ADMIN — ATENOLOL 50 MG: 50 TABLET ORAL at 08:19

## 2018-07-05 RX ADMIN — CEFAZOLIN SODIUM 2000 MG: 2 SOLUTION INTRAVENOUS at 17:33

## 2018-07-05 RX ADMIN — CEFAZOLIN SODIUM 2000 MG: 2 SOLUTION INTRAVENOUS at 00:46

## 2018-07-05 NOTE — ASSESSMENT & PLAN NOTE
Cellulitis is much improved on IV Ancef    Will discuss with Podiatry whether they want further debridement today are not

## 2018-07-05 NOTE — PROGRESS NOTES
Progress Note - Infectious Disease   Ana Machado 62 y o  female MRN: [id number]  Unit/Bed#: [address] [place] [address]  Encounter: [id number]    Assessment:  62 y  o  year old female with PMH of DM with peripheral neuropathy, asthma, HLD, HTN who presents with R foot erythema, and swelling which started one week ago      1  Sepsis due to RLE cellulitis/abscess - in the setting of R charcot foot and peripheral neuropathy  Xray R foot showed worsening soft tissue swelling in the midfoot and forefoot   No soft tissue gas to suggest an abscess   No focal bony resorption identified to confirm osteomyelitis  CT showed plantar fluid collection at the medial aspect of the forefoot at the level of the 1st tarsometatarsal, measuring about 2 8 x 1 4 cm  Additional area of plantar ulceration with the surrounding phlegmonous changes in the 1st submetatarsal region  No evidence of osteomyelitisBlood cx are showing no growth to date and superficial wound cx is growing MSSA       2  Fever - resolved     Plan:  1  Continue cefazolin while waiting for debridement of R foot  Will follow OR cx  Subjective/Objective   Chief Complaint:  RLE cellulitis/abscess    Subjective: feels well and denies any complaints  Objective:     HR:  [56-66] 56  Resp:  [18] 18  BP: (124-137)/(68-75) 137/75  SpO2:  [94 %-96 %] 94 %  Temp (24hrs), Av 1 °F (36 7 °C), Min:98 °F (36 7 °C), Max:98 3 °F (36 8 °C)  Current: Temperature: 98 3 °F (36 8 °C)    Physical Exam:   Constitutional: She appears well-developed and well-nourished  Cardiovascular: Normal rate and regular rhythm     Pulmonary/Chest: Effort normal and breath sounds normal  She has no wheezes  Abdominal: Soft  Bowel sounds are normal  There is no tenderness  Musculoskeletal: She exhibits edema  R foot dressing intact  Skin: Skin is warm   There is erythema            Invasive Devices     Peripheral Intravenous Line            Peripheral IV 18 Left 3 days                Lab, Imaging and other studies: I have personally reviewed pertinent reports

## 2018-07-05 NOTE — PROGRESS NOTES
Progress Note - Podiatry  Kavita Ramirez 62 y o  female MRN: [id number]  Unit/Bed#: [address] [place] [address] 202-02 Encounter: [id number]    Assessment/Plan:  Cellulitis right foot              -continue IV antibiotics per Infectious Disease  Ulceration right foot SQ depth/DM 2 with ulcer              -to OR Friday 1:30 p m  for I & D/ulcer debridement right foot   -NPO after 12 midnight, consent reviewed in detail and signed  -will hold heparin after tonights last dose    -2nd opinion confirms what I suspected that the abscess radiology noted on CT was nothing more than a bursa associated with the Charcot arthropathy  Diabetes 2 with neuropathy  Diabetes 2 with Charcot changes of the midfoot                  Subjective/Objective   Chief Complaint:   Chief Complaint   Patient presents with    Follow-Up Cellulitis     Patient states she was seen here Thursday for cellulitis on her R foot  Patient states that her foot is getting worse  Patient has a blister and an open area on the bottom of her foot  R foot swollena nd R and open area on the bottom of her foot       Subjective:  Relates no pain or discomfort from her foot  Relates she had met Dr Chi Sanchez before once when seeting Dr Ning Littlejohn at his office  Denies any new pain or discomfort or shortness of breath  Relates noticing little less swelling in her leg  Denies any shortness of breath or new pain and discomfort  Blood pressure 137/75, pulse 56, temperature 98 3 °F (36 8 °C), temperature source Oral, resp  rate 18, height 5' 5" (1 651 m), weight 98 3 kg (216 lb 11 4 oz), SpO2 94 %  ,Body mass index is 36 06 kg/m²      Invasive Devices     Peripheral Intravenous Line            Peripheral IV 07/02/18 Left 2 days                Physical Exam:   General appearance: alert, cooperative and no distress  Neuro/Vascular: Right: DP & PT Palpable, Left: DP & PT Palpable,    Capillary Filling:  Immediate, Edema:  +2 right foot, and left foot  Extremity:  Right foot submetatarsal #1 SQ ulcerative breakdown measuring 1 0 x 1 0 x 2 0 cm remains unchanged, probes to capsule and then probes additionally 2 cm into the 1st interspace at 10 o'clock,  larger superficial breakdown of the peripheral skin is dry with no active necrosis noted  Edema and calor continue to diminish            Labs and other studies:   CBC w/diff    Results from last 7 days  Lab Units 07/03/18  0500 07/02/18  0758   WBC Thousand/uL 4 69 8 57   HEMOGLOBIN g/dL 12 6 14 2   HEMATOCRIT % 37 1 40 1   PLATELETS Thousands/uL 255 246   NEUTROS PCT %  --  83*   LYMPHS PCT %  --  7*   MONOS PCT %  --  7   EOS PCT %  --  2     BMP    Results from last 7 days  Lab Units 07/03/18  0500   SODIUM mmol/L 136   POTASSIUM mmol/L 3 9   CHLORIDE mmol/L 101   CO2 mmol/L 27   BUN mg/dL 12   CREATININE mg/dL 0 96   GLUCOSE RANDOM mg/dL 112   CALCIUM mg/dL 8 4     CMP    Results from last 7 days  Lab Units 07/03/18  0500 07/02/18  0758   SODIUM mmol/L 136 131*   POTASSIUM mmol/L 3 9 4 3   CHLORIDE mmol/L 101 92*   CO2 mmol/L 27 29   BUN mg/dL 12 12   CREATININE mg/dL 0 96 0 85   CALCIUM mg/dL 8 4 8 9   TOTAL PROTEIN g/dL  --  7 8   BILIRUBIN TOTAL mg/dL  --  0 50   ALK PHOS U/L  --  145*   ALT U/L  --  53   AST U/L  --  46*   GLUCOSE RANDOM mg/dL 112 133       @Culture@  Lab Results   Component Value Date    BLOODCX No Growth at 48 hrs  07/02/2018    BLOODCX No Growth at 48 hrs  07/02/2018    URINECX 70,000-79,000 cfu/ml Mixed Contaminants X6 08/10/2016         Current Facility-Administered Medications:     acetaminophen (TYLENOL) tablet 650 mg, 650 mg, Oral, Q6H PRN, Neto Rodriguez MD    atenolol (TENORMIN) tablet 50 mg, 50 mg, Oral, Daily, Neto Rodriguez MD, 50 mg at 07/05/18 0819    buPROPion (WELLBUTRIN XL) 24 hr tablet 300 mg, 300 mg, Oral, Daily, Neto Rodriguez MD, 300 mg at 07/05/18 9674    carBAMazepine (TEGretol) tablet 600 mg, 600 mg, Oral, Daily With Symone Beebe Fly, DO, 600 mg at 07/04/18 2028    ceFAZolin (ANCEF) IVPB (premix) 2,000 mg, 2,000 mg, Intravenous, Q8H, Ava Londono MD, Last Rate: 100 mL/hr at 07/05/18 0826, 2,000 mg at 07/05/18 0826    heparin (porcine) subcutaneous injection 5,000 Units, 5,000 Units, Subcutaneous, Q8H [address], 5,000 Units at 07/05/18 0615 **AND** [CANCELED] Platelet count, , , Once, Calista Degroot MD    insulin lispro (HumaLOG) 100 units/mL subcutaneous injection 1-5 Units, 1-5 Units, Subcutaneous, TID AC, 1 Units at 07/02/18 1400 **AND** Fingerstick Glucose (POCT), , , TID AC, Calista Degroot MD    nicotine (NICODERM CQ) 7 mg/24hr TD 24 hr patch 1 patch, 1 patch, Transdermal, Daily, Calista Degroot MD    ondansetron (ZOFRAN) injection 4 mg, 4 mg, Intravenous, Q6H PRN, Calista Degroot MD    pravastatin (PRAVACHOL) tablet 20 mg, 20 mg, Oral, Daily With Collin Henriquez MD, 20 mg at 07/04/18 1616    risperiDONE (RisperDAL) tablet 1 mg, 1 mg, Oral, HS, Eveline Fly, DO, 1 mg at 07/04/18 2029    sertraline (ZOLOFT) tablet 100 mg, 100 mg, Oral, Daily, Calista Degroot MD, 100 mg at 07/05/18 4881    ziprasidone (GEODON) capsule 60 mg, 60 mg, Oral, HS, Eveline Fly, DO, 60 mg at 07/04/18 2029    Imaging: I have personally reviewed pertinent films in PACS  EKG, Pathology, and Other Studies: I have personally reviewed pertinent reports    VTE Pharmacologic Prophylaxis: Heparin  VTE Mechanical Prophylaxis: sequential compression device    Kristi Duarte DPM

## 2018-07-05 NOTE — PROGRESS NOTES
Progress Note - Magdi  1960, 62 y o  female MRN: [id number]    Unit/Bed#: [address] [place] [address] Encounter: [id number]    Primary Care Provider: Mary Shankar DO   Date and time admitted to hospital: 2018  7:17 AM        * Cellulitis of right lower extremity   Assessment & Plan    Cellulitis is much improved on IV Ancef  Will discuss with Podiatry whether they want further debridement today are not        Type 2 diabetes mellitus with diabetic neuropathy, without long-term current use of insulin ([facility] [address] )   Assessment & Plan    Blood sugars are well controlled on diet only, A1c is 5 7         Essential hypertension   Assessment & Plan    Blood pressure is controlled on atenolol        Hyponatremia   Assessment & Plan    Hyponatremia was present admission due to hydrochlorothiazide  It resolved          VTE Prophylaxis: in place    Patient Centered Rounds: I rounded with patient's nurse    Current Length of Stay: 3 day(s)    Current Patient Status: Inpatient    Certification Statement: Pt requires additional inpatient hospital stay due to: see assessment and plan        Subjective:   Patient denies chest pain, shortness of breath, cough, abdominal pain, diarrhea, hives    All other ROS are negative    Objective:     Vitals:   Temp (24hrs), Av 1 °F (36 7 °C), Min:98 °F (36 7 °C), Max:98 3 °F (36 8 °C)    HR:  [56-66] 56  Resp:  [18] 18  BP: (124-137)/(68-75) 137/75  SpO2:  [94 %-96 %] 94 %  Body mass index is 36 06 kg/m²  Input and Output Summary (last 24 hours):     No intake or output data in the 24 hours ending 18 0814    Physical Exam:     Physical Exam   Constitutional: She appears well-developed  No distress  HENT:   Head: Normocephalic  Mouth/Throat: Oropharynx is clear and moist    Eyes: Conjunctivae are normal    Neck: Neck supple  Cardiovascular: Normal rate and regular rhythm  Pulmonary/Chest: Effort normal  No respiratory distress  She has no wheezes  She has no rales  Abdominal: Soft  Bowel sounds are normal  She exhibits no distension  There is no tenderness  Musculoskeletal:   Right foot is dressed   Lymphadenopathy:     She has no cervical adenopathy  Neurological: She is alert  No cranial nerve deficit  Skin: Skin is warm and dry  No rash noted  Psychiatric: She has a normal mood and affect  Vitals reviewed  I personally reviewed labs and imaging reports for today  Last 24 Hours Medication List:     Current Facility-Administered Medications:  acetaminophen 650 mg Oral Q6H PRN Arpan Salazar MD    atenolol 50 mg Oral Daily Arpan Salazar MD    buPROPion 300 mg Oral Daily Arpan Salazar MD    carBAMazepine 600 mg Oral Daily With [facility], DO    cefazolin 2,000 mg Intravenous Q8H Jennifer Thibodeaux MD Last Rate: 2,000 mg (07/05/18 0046)   heparin (porcine) 5,000 Units Subcutaneous Q8H [facility], MD    insulin lispro 1-5 Units Subcutaneous TID Malik Chambers MD    nicotine 1 patch Transdermal Daily Arpan Salazar MD    ondansetron 4 mg Intravenous Q6H PRN Arpan Salazar MD    pravastatin 20 mg Oral Daily With Xiomara David MD    risperiDONE 1 mg Oral HS Eveline Merino, DO    sertraline 100 mg Oral Daily Arpan Salazar MD    ziprasidone 60 mg Oral HS Moody Altamirano DO           Today, Patient Was Seen By: Arpan Salazar MD    ** Please Note: Dictation voice to text software may have been used in the creation of this document   **

## 2018-07-06 ENCOUNTER — ANESTHESIA EVENT (INPATIENT)
Dept: PERIOP | Facility: HOSPITAL | Age: 58
DRG: 854 | End: 2018-07-06
Payer: MEDICARE

## 2018-07-06 ENCOUNTER — ANESTHESIA (INPATIENT)
Dept: PERIOP | Facility: HOSPITAL | Age: 58
DRG: 854 | End: 2018-07-06
Payer: MEDICARE

## 2018-07-06 LAB
GLUCOSE SERPL-MCNC: 109 MG/DL (ref 65–140)
GLUCOSE SERPL-MCNC: 209 MG/DL (ref 65–140)
GLUCOSE SERPL-MCNC: 83 MG/DL (ref 65–140)
GLUCOSE SERPL-MCNC: 92 MG/DL (ref 65–140)
GLUCOSE SERPL-MCNC: 96 MG/DL (ref 65–140)

## 2018-07-06 PROCEDURE — 82948 REAGENT STRIP/BLOOD GLUCOSE: CPT

## 2018-07-06 PROCEDURE — 87147 CULTURE TYPE IMMUNOLOGIC: CPT | Performed by: PODIATRIST

## 2018-07-06 PROCEDURE — 99232 SBSQ HOSP IP/OBS MODERATE 35: CPT | Performed by: INTERNAL MEDICINE

## 2018-07-06 PROCEDURE — 0J9Q0ZZ DRAINAGE OF RIGHT FOOT SUBCUTANEOUS TISSUE AND FASCIA, OPEN APPROACH: ICD-10-PCS | Performed by: PODIATRIST

## 2018-07-06 PROCEDURE — 87205 SMEAR GRAM STAIN: CPT | Performed by: PODIATRIST

## 2018-07-06 PROCEDURE — 87075 CULTR BACTERIA EXCEPT BLOOD: CPT | Performed by: PODIATRIST

## 2018-07-06 PROCEDURE — 87070 CULTURE OTHR SPECIMN AEROBIC: CPT | Performed by: PODIATRIST

## 2018-07-06 RX ORDER — EPHEDRINE SULFATE 50 MG/ML
INJECTION, SOLUTION INTRAVENOUS AS NEEDED
Status: DISCONTINUED | OUTPATIENT
Start: 2018-07-06 | End: 2018-07-06 | Stop reason: SURG

## 2018-07-06 RX ORDER — MIDAZOLAM HYDROCHLORIDE 1 MG/ML
INJECTION INTRAMUSCULAR; INTRAVENOUS AS NEEDED
Status: DISCONTINUED | OUTPATIENT
Start: 2018-07-06 | End: 2018-07-06 | Stop reason: SURG

## 2018-07-06 RX ORDER — GLYCOPYRROLATE 0.2 MG/ML
INJECTION INTRAMUSCULAR; INTRAVENOUS AS NEEDED
Status: DISCONTINUED | OUTPATIENT
Start: 2018-07-06 | End: 2018-07-06 | Stop reason: SURG

## 2018-07-06 RX ORDER — PANTOPRAZOLE SODIUM 40 MG/1
40 TABLET, DELAYED RELEASE ORAL
Status: DISCONTINUED | OUTPATIENT
Start: 2018-07-06 | End: 2018-07-09 | Stop reason: HOSPADM

## 2018-07-06 RX ORDER — LIDOCAINE HYDROCHLORIDE 10 MG/ML
INJECTION, SOLUTION EPIDURAL; INFILTRATION; INTRACAUDAL; PERINEURAL AS NEEDED
Status: DISCONTINUED | OUTPATIENT
Start: 2018-07-06 | End: 2018-07-06 | Stop reason: HOSPADM

## 2018-07-06 RX ORDER — METOCLOPRAMIDE HYDROCHLORIDE 5 MG/ML
10 INJECTION INTRAMUSCULAR; INTRAVENOUS ONCE AS NEEDED
Status: DISCONTINUED | OUTPATIENT
Start: 2018-07-06 | End: 2018-07-06 | Stop reason: HOSPADM

## 2018-07-06 RX ORDER — SODIUM CHLORIDE, SODIUM LACTATE, POTASSIUM CHLORIDE, CALCIUM CHLORIDE 600; 310; 30; 20 MG/100ML; MG/100ML; MG/100ML; MG/100ML
125 INJECTION, SOLUTION INTRAVENOUS CONTINUOUS
Status: DISPENSED | OUTPATIENT
Start: 2018-07-06 | End: 2018-07-06

## 2018-07-06 RX ORDER — FENTANYL CITRATE/PF 50 MCG/ML
50 SYRINGE (ML) INJECTION
Status: DISCONTINUED | OUTPATIENT
Start: 2018-07-06 | End: 2018-07-06 | Stop reason: HOSPADM

## 2018-07-06 RX ORDER — PROPOFOL 10 MG/ML
INJECTION, EMULSION INTRAVENOUS AS NEEDED
Status: DISCONTINUED | OUTPATIENT
Start: 2018-07-06 | End: 2018-07-06 | Stop reason: SURG

## 2018-07-06 RX ORDER — SODIUM CHLORIDE, SODIUM LACTATE, POTASSIUM CHLORIDE, CALCIUM CHLORIDE 600; 310; 30; 20 MG/100ML; MG/100ML; MG/100ML; MG/100ML
INJECTION, SOLUTION INTRAVENOUS CONTINUOUS PRN
Status: DISCONTINUED | OUTPATIENT
Start: 2018-07-06 | End: 2018-07-06 | Stop reason: SURG

## 2018-07-06 RX ORDER — BUPIVACAINE HYDROCHLORIDE 5 MG/ML
INJECTION, SOLUTION EPIDURAL; INTRACAUDAL AS NEEDED
Status: DISCONTINUED | OUTPATIENT
Start: 2018-07-06 | End: 2018-07-06 | Stop reason: HOSPADM

## 2018-07-06 RX ORDER — LIDOCAINE HYDROCHLORIDE 10 MG/ML
INJECTION, SOLUTION INFILTRATION; PERINEURAL AS NEEDED
Status: DISCONTINUED | OUTPATIENT
Start: 2018-07-06 | End: 2018-07-06 | Stop reason: SURG

## 2018-07-06 RX ORDER — FENTANYL CITRATE 50 UG/ML
INJECTION, SOLUTION INTRAMUSCULAR; INTRAVENOUS AS NEEDED
Status: DISCONTINUED | OUTPATIENT
Start: 2018-07-06 | End: 2018-07-06 | Stop reason: SURG

## 2018-07-06 RX ORDER — MAGNESIUM HYDROXIDE/ALUMINUM HYDROXICE/SIMETHICONE 120; 1200; 1200 MG/30ML; MG/30ML; MG/30ML
30 SUSPENSION ORAL EVERY 4 HOURS PRN
Status: DISCONTINUED | OUTPATIENT
Start: 2018-07-06 | End: 2018-07-09 | Stop reason: HOSPADM

## 2018-07-06 RX ADMIN — BUPROPION HYDROCHLORIDE 300 MG: 300 TABLET, FILM COATED, EXTENDED RELEASE ORAL at 08:21

## 2018-07-06 RX ADMIN — SERTRALINE HYDROCHLORIDE 100 MG: 100 TABLET ORAL at 08:21

## 2018-07-06 RX ADMIN — HEPARIN SODIUM 5000 UNITS: 5000 INJECTION, SOLUTION INTRAVENOUS; SUBCUTANEOUS at 22:09

## 2018-07-06 RX ADMIN — FENTANYL CITRATE 25 MCG: 50 INJECTION, SOLUTION INTRAMUSCULAR; INTRAVENOUS at 15:09

## 2018-07-06 RX ADMIN — EPHEDRINE SULFATE 7.5 MG: 50 INJECTION, SOLUTION INTRAMUSCULAR; INTRAVENOUS; SUBCUTANEOUS at 15:17

## 2018-07-06 RX ADMIN — PANTOPRAZOLE SODIUM 40 MG: 40 TABLET, DELAYED RELEASE ORAL at 08:24

## 2018-07-06 RX ADMIN — CEFAZOLIN SODIUM 2000 MG: 2 SOLUTION INTRAVENOUS at 08:21

## 2018-07-06 RX ADMIN — ZIPRASIDONE HCL 60 MG: 20 CAPSULE ORAL at 20:13

## 2018-07-06 RX ADMIN — MIDAZOLAM HYDROCHLORIDE 2 MG: 1 INJECTION, SOLUTION INTRAMUSCULAR; INTRAVENOUS at 14:37

## 2018-07-06 RX ADMIN — GLYCOPYRROLATE 0.1 MG: 0.2 INJECTION, SOLUTION INTRAMUSCULAR; INTRAVENOUS at 14:55

## 2018-07-06 RX ADMIN — ATENOLOL 50 MG: 50 TABLET ORAL at 08:21

## 2018-07-06 RX ADMIN — CARBAMAZEPINE 600 MG: 200 TABLET ORAL at 20:13

## 2018-07-06 RX ADMIN — LIDOCAINE HYDROCHLORIDE 15 MG: 10 INJECTION, SOLUTION INFILTRATION; PERINEURAL at 14:55

## 2018-07-06 RX ADMIN — GLYCOPYRROLATE 0.15 MG: 0.2 INJECTION, SOLUTION INTRAMUSCULAR; INTRAVENOUS at 15:00

## 2018-07-06 RX ADMIN — EPHEDRINE SULFATE 7.5 MG: 50 INJECTION, SOLUTION INTRAMUSCULAR; INTRAVENOUS; SUBCUTANEOUS at 15:13

## 2018-07-06 RX ADMIN — RISPERIDONE 1 MG: 1 TABLET ORAL at 20:13

## 2018-07-06 RX ADMIN — FENTANYL CITRATE 50 MCG: 50 INJECTION, SOLUTION INTRAMUSCULAR; INTRAVENOUS at 14:48

## 2018-07-06 RX ADMIN — ONDANSETRON 4 MG: 2 INJECTION INTRAMUSCULAR; INTRAVENOUS at 15:32

## 2018-07-06 RX ADMIN — PROPOFOL 170 MG: 10 INJECTION, EMULSION INTRAVENOUS at 14:56

## 2018-07-06 RX ADMIN — FENTANYL CITRATE 25 MCG: 50 INJECTION, SOLUTION INTRAMUSCULAR; INTRAVENOUS at 15:32

## 2018-07-06 RX ADMIN — SODIUM CHLORIDE, SODIUM LACTATE, POTASSIUM CHLORIDE, AND CALCIUM CHLORIDE: .6; .31; .03; .02 INJECTION, SOLUTION INTRAVENOUS at 14:35

## 2018-07-06 RX ADMIN — CEFAZOLIN SODIUM 2000 MG: 2 SOLUTION INTRAVENOUS at 01:50

## 2018-07-06 RX ADMIN — CEFAZOLIN SODIUM 2000 MG: 2 SOLUTION INTRAVENOUS at 18:24

## 2018-07-06 NOTE — CASE MANAGEMENT
Continued Stay Review    Date:7/6/2018    Vital Signs: /79 (BP Location: Right arm)   Pulse 62   Temp 98 °F (36 7 °C) (Oral)   Resp 18   Ht 5' 5" (1 651 m)   Wt 98 3 kg (216 lb 11 4 oz)   SpO2 96%   BMI 36 06 kg/m²     Medications:   Scheduled Meds:   Current Facility-Administered Medications:  [MAR Hold] acetaminophen 650 mg Oral Q6H PRN Lauren Garrido MD    [MAR Hold] aluminum-magnesium hydroxide-simethicone 30 mL Oral Q4H PRN Eveline Fly, DO    [MAR Hold] atenolol 50 mg Oral Daily Lauren Garrido MD    [facility] Hold] bacitracin 100,000 units and neomycin-polymyxin B () 1 mL in sodium chloride 0 9% 1000 mL irrigation bag  Irrigation Once Jenna Cotto DPM    [facility] Hold] buPROPion 300 mg Oral Daily Lauren Garrido MD    [MAR Hold] carBAMazepine 600 mg Oral Daily With [facility], DO    [facility] cefazolin 2,000 mg Intravenous Q8H Mike Hansen MD Last Rate: 2,000 mg (07/06/18 0821)   [MAR Hold] heparin (porcine) 5,000 Units Subcutaneous Q8H [address] Lauren Garrido MD    [MAR Hold] insulin lispro 1-5 Units Subcutaneous TID AC Lauren Garrido MD    [MAR Hold] nicotine 1 patch Transdermal Daily Lauren Garrido MD    [MAR Hold] ondansetron 4 mg Intravenous Q6H PRN Lauren Garrido MD    [MAR Hold] pantoprazole 40 mg Oral Early Morning Eveline Fly, DO    [MAR Hold] pravastatin 20 mg Oral Daily With Anne Marie Burleson MD    [facility] Hold] risperiDONE 1 mg Oral HS Eveline Fly, DO    [MAR Hold] sertraline 100 mg Oral Daily Lauren Garrido MD    [MAR Hold] ziprasidone 60 mg Oral HS Eveline Fly, DO      Facility-Administered Medications Ordered in Other Encounters:  fentanyl citrate (PF)  Intravenous PRN Latoya Lehman, CRNA   glycopyrrolate   PRN Latoya Mews, CRNA   lidocaine   PRN Latoya Lehman, CRNA   midazolam  Intravenous PRN Latoya Lehman, CRNA   propofol  Intravenous PRN Latoya Lehman, CRNA     Continuous Infusions:    PRN Meds:   [MAR Hold] acetaminophenx1    [MAR Hold] aluminum-magnesium hydroxide-simethicone1    [MAR Hold] ondansetron    Abnormal Labs/Diagnostic Results:    Age/Sex: 62 y o  female     Assessment/Plan: id-Sepsis due to RLE cellulitis/abscess - in the setting of R charcot foot and peripheral neuropathy  Xray R foot showed worsening soft tissue swelling in the midfoot and forefoot   No soft tissue gas to suggest an abscess   No focal bony resorption identified to confirm osteomyelitis  CT showed plantar fluid collection at the medial aspect of the forefoot at the level of the 1st tarsometatarsal, measuring about 2 8 x 1 4 cm  Additional area of plantar ulceration with the surrounding phlegmonous changes in the 1st submetatarsal region  No evidence of osteomyelitisBlood cx are showing no growth to date and superficial wound cx is growing MSSA       2  Fever - resolved     Plan:  1  Continue cefazolin while waiting for debridement of R foot planned for today   Will follow OR cx      Pt requires additional inpatient hospital stay due to: see assessment and plan    Discharge Plan: lives with -- plans to go home

## 2018-07-06 NOTE — ANESTHESIA PREPROCEDURE EVALUATION
Review of Systems/Medical History  Patient summary reviewed  Chart reviewed      Cardiovascular  Hyperlipidemia, Hypertension ,    Pulmonary  Asthma , well controlled/ stable ,        GI/Hepatic  Negative GI/hepatic ROS          Negative  ROS        Endo/Other  Diabetes poorly controlled type 2 Diet controlled,      GYN  Negative gynecology ROS          Hematology  Negative hematology ROS      Musculoskeletal  Osteoarthritis,   Arthritis     Neurology    Headaches, Diabetic neuropathy,    Psychology   Anxiety, Depression , depressed and being treated for depression,              Physical Exam    Airway    Mallampati score: II  TM Distance: >3 FB  Neck ROM: limited     Dental   No notable dental hx     Cardiovascular  Cardiovascular exam normal    Pulmonary  Pulmonary exam normal     Other Findings        Anesthesia Plan  ASA Score- 3     Anesthesia Type- general with ASA Monitors  Additional Monitors:   Airway Plan: LMA  Plan Factors-    Induction- intravenous  Postoperative Plan- Plan for postoperative opioid use  Informed Consent- Anesthetic plan and risks discussed with patient  I personally reviewed this patient with the CRNA  Discussed and agreed on the Anesthesia Plan with the CRNA  Piedad Chavez

## 2018-07-06 NOTE — PROGRESS NOTES
Progress Note - Shanon rBight 1960, 62 y o  female MRN: [id number]    Unit/Bed#: [address] [place] [address] Encounter: [id number]    Primary Care Provider: Jacky Earl DO   Date and time admitted to hospital: 7/2/2018  7:17 AM        Diabetic infection of right foot [facility])   Assessment & Plan    Lab Results   Component Value Date    HGBA1C 6 6 (H) 07/03/2018       Recent Labs      07/05/18   1050  07/05/18   1615  07/05/18   2119  07/06/18   0610   POCGLU  112  125  128  109       Blood Sugar Average: Last 72 hrs:  (P) 736 [id number]     Overall glucose control is stable on dietary control        Sepsis (HCC)   Assessment & Plan    Sepsis resolved        * Cellulitis of right lower extremity   Assessment & Plan    Day 5 of  IV Ancef  OR for debridement is planned for today approximately 130        Type 2 diabetes mellitus with diabetic neuropathy, without long-term current use of insulin (HCC)   Assessment & Plan    Decreased sensation distally and no complaints of pain this morning        Hyponatremia   Assessment & Plan    Improved off hydrochlorothiazide -136 on most recent readings        Essential hypertension   Assessment & Plan    Blood pressure is controlled on atenolol-stable 136/79 today        Bipolar I disorder, most recent episode mixed, severe without psychotic features ([facility] [address] )   Assessment & Plan    She is stable on her Geodon, Risperdal, tegretol, sertraline , no signs of decompensation        Hyperlipidemia   Assessment & Plan    Continue on statins            VTE Prophylaxis: in place    Patient Centered Rounds: I rounded with patient's nurse    Current Length of Stay: 4 day(s)    Current Patient Status: Inpatient    Certification Statement: Pt requires additional inpatient hospital stay due to: see assessment and plan        Subjective:   Patient without complaints of pain in her feet or ankles  Denies any shortness of breath, lightheadedness or dizziness    Patient does have some intermittent heartburn    All other ROS are negative    Objective:     Vitals:   Temp (24hrs), Av 1 °F (36 7 °C), Min:97 8 °F (36 6 °C), Max:98 3 °F (36 8 °C)    HR:  [56-69] 62  Resp:  [18] 18  BP: (121-157)/(63-79) 136/79  SpO2:  [94 %-98 %] 96 %  Body mass index is 36 06 kg/m²  Input and Output Summary (last 24 hours):     No intake or output data in the 24 hours ending 18 0743    Physical Exam:     Physical Exam   Constitutional: She is oriented to person, place, and time  She appears well-developed and well-nourished  No distress  HENT:   Head: Normocephalic and atraumatic  Mouth/Throat: No oropharyngeal exudate  Eyes: Conjunctivae are normal  Pupils are equal, round, and reactive to light  Right eye exhibits no discharge  Left eye exhibits no discharge  Neck: No JVD present  No tracheal deviation present  No thyromegaly present  Cardiovascular: Normal rate and regular rhythm  Exam reveals no friction rub  No murmur heard  Pulmonary/Chest: Effort normal and breath sounds normal  No respiratory distress  She has no wheezes  Abdominal: Soft  Bowel sounds are normal  She exhibits no distension  There is no tenderness  Musculoskeletal: She exhibits no edema  Dressing in place on right foot with some minimal tenderness over the forefoot on palpation   Neurological: She is alert and oriented to person, place, and time  No cranial nerve deficit  Psychiatric: She has a normal mood and affect  Her behavior is normal  Judgment and thought content normal    Nursing note and vitals reviewed  I personally reviewed labs and imaging reports for today        Last 24 Hours Medication List:     Current Facility-Administered Medications:  acetaminophen 650 mg Oral Q6H PRN Tanmay Osorio MD    atenolol 50 mg Oral Daily Tanmay Osorio MD    bacitracin 100,000 units and neomycin-polymyxin B () 1 mL in sodium chloride 0 9% 1000 mL irrigation bag  Irrigation Once Ritta Query, DPM    buPROPion 300 mg Oral Daily Tanmay Osorio MD    carBAMazepine 600 mg Oral Daily With [facility], DO    cefazolin 2,000 mg Intravenous Q8H Ion Leon MD Last Rate: 2,000 mg (07/06/18 0150)   heparin (porcine) 5,000 Units Subcutaneous Q8H 1000 Sydnie Bray MD    insulin lispro 1-5 Units Subcutaneous TID Naya Velasquez MD    nicotine 1 patch Transdermal Daily Tanmay Osorio MD    ondansetron 4 mg Intravenous Q6H PRN Tanmay Osorio MD    pravastatin 20 mg Oral Daily With Benny Babcock MD    risperiDONE 1 mg Oral HS Eveline Merino DO    sertraline 100 mg Oral Daily Tanmay sOorio MD    ziprasidone 60 mg Oral HS Elvis Whyte DO           Today, Patient Was Seen By: Elvis Whyte DO    ** Please Note: Dictation voice to text software may have been used in the creation of this document   **

## 2018-07-06 NOTE — ASSESSMENT & PLAN NOTE
Lab Results   Component Value Date    HGBA1C 6 6 (H) 07/03/2018       Recent Labs      07/05/18   1050  07/05/18   1615  07/05/18   2119  07/06/18   0610   POCGLU  112  125  128  109       Blood Sugar Average: Last 72 hrs:  (P) 819 [id number]     Overall glucose control is stable on dietary control

## 2018-07-06 NOTE — OP NOTE
OPERATIVE REPORT  PATIENT NAME: Shan Turner    :  1960  MRN: [id number]  Pt Location:  OR ROOM 01    SURGERY DATE: 2018    Surgeon(s) and Role:     * Kristi Duarte DPM - Primary     * Chandra Quinones DPM - Assisting    Preop Diagnosis:  [facility] [O74 481, L08 9]    Post-Op Diagnosis Codes:     * Diabetic infection of right foot [facility] [E11 628, L08 9]    Procedure(s) (LRB):  INCISION AND DRAINAGE (I&D) EXTREMITY (Right)    Specimen(s):  ID Type Source Tests Collected by Time Destination   A : Diabetic Ulcer Wound Swabs Wound Foot, Right ANAEROBIC CULTURE AND GRAM STAIN, WOUND CULTURE Kristi Duarte DPM 2018 1526        Estimated Blood Loss:   10 mL    Drains:  None  Hemostasis:  Pneumatic ankle tourniquet for 13 minutes at 250 mmHg    Materials:  2 feet of 0 5 inch iodoform packing    Anesthesia Type:   Choice with local in a 1 to 1 mix of 1% lidocaine plain and 0 5% marcaine plain  Operative Indications:  Diabetic infection of right foot ([facility] ) [A07 425, L08 9]      Operative Findings:  Wound measures 4 x 1 5 x 4 cm after removal of all nonviable and necrotic tissue  The wound extends into the 1st interspace  There is no remaining evidence of purulence, sinus tract, abscess  Complications:   None    Procedure and Technique:  Under mild sedation, the patient was brought into the operating room and placed on the operating room table in the supine position  A pneumatic ankle tourniquet was then placed around the patient's right ankle with ample webril padding  A time out was performed to confirm the correct patient, procedure and site with all parties in agreement  Following IV sedation, local anesthetic was obtained about the patient's right foot was performed consisting of 10 ml of 1% Lidocaine and 0 5% Bupivacaine in a 1:1 mixture  The foot was then scrubbed, prepped and draped in the usual aseptic manner   An esmarch bandage was utilized to exsangunate the patients foot and the pneumatic ankle tourniquet was then inflated  The esmarch bandage was removed and the foot was placed on the operating room table  Attention was then directed to the right foot  Using a sterile 15 blade an incision was made proximally and distally to the existing wound extending from the plantar 1st MPJ to the 1st interspace  Existing wound edges were excised using a sterile 15 blade and passed to the back table  Using a tissue Nipper all nonviable and necrotic tissue was removed from the wound measuring 4 x 1 5 x 4 cm down to subcutaneous tissue and extending into the 1st interspace removing less than 8 centimeters squared of necrotic fibrotic tissue  Wound was copiously irrigated using pulse lavage normal sterile saline  It was noted that there was no evidence of purulence, sinus tract, abscess  It was also noted that there was adequate blood flow after tourniquet was deflated after 13 minutes  Wound was then packed with 0 5 inch iodoform packing and dressed with sterile 4x4s, ABDs, Kerlix, Ace  The patient tolerated the procedure and anesthesia well and was transported to the PACU with vital signs stable       Patient Disposition:  hemodynamically stable    SIGNATURE: Osvaldo Dumont DPM  DATE: July 6, 2018  TIME: 3:57 PM

## 2018-07-06 NOTE — PROGRESS NOTES
Progress Note - Infectious Disease   Chuckie Abbasi 62 y o  female MRN: [id number]  Unit/Bed#: [address] [place] [address]  Encounter: [id number]    Assessment:  62 y  o  year old female with PMH of DM with peripheral neuropathy, asthma, HLD, HTN who presents with R foot erythema, and swelling which started one week ago      1  Sepsis due to RLE cellulitis/abscess - in the setting of R charcot foot and peripheral neuropathy  Xray R foot showed worsening soft tissue swelling in the midfoot and forefoot   No soft tissue gas to suggest an abscess   No focal bony resorption identified to confirm osteomyelitis  CT showed plantar fluid collection at the medial aspect of the forefoot at the level of the 1st tarsometatarsal, measuring about 2 8 x 1 4 cm  Additional area of plantar ulceration with the surrounding phlegmonous changes in the 1st submetatarsal region  No evidence of osteomyelitisBlood cx are showing no growth to date and superficial wound cx is growing MSSA       2  Fever - resolved     Plan:  1  Continue cefazolin while waiting for debridement of R foot planned for today  Will follow OR cx  Subjective/Objective   Chief Complaint: RLE cellulitis/abscess    Subjective: feels well and denies any complaints  Objective:     HR:  [62-69] 62  Resp:  [18] 18  BP: (121-157)/(63-79) 136/79  SpO2:  [96 %-98 %] 96 %  Temp (24hrs), Av °F (36 7 °C), Min:97 8 °F (36 6 °C), Max:98 1 °F (36 7 °C)  Current: Temperature: 98 °F (36 7 °C)    Physical Exam:  Constitutional: She appears well-developed and well-nourished  Cardiovascular: Normal rate and regular rhythm     Pulmonary/Chest: Effort normal and breath sounds normal  She has no wheezes  Abdominal: Soft  Bowel sounds are normal  There is no tenderness  Musculoskeletal: She exhibits edema  R foot dressing intact  Skin: Skin is warm  There is erythema       Invasive Devices     Peripheral Intravenous Line            Peripheral IV 18 Right Wrist less than 1 day Lab, Imaging and other studies: I have personally reviewed pertinent reports

## 2018-07-07 LAB
ALBUMIN SERPL BCP-MCNC: 2.7 G/DL (ref 3.5–5)
ALP SERPL-CCNC: 99 U/L (ref 46–116)
ALT SERPL W P-5'-P-CCNC: 30 U/L (ref 12–78)
ANION GAP SERPL CALCULATED.3IONS-SCNC: 6 MMOL/L (ref 4–13)
AST SERPL W P-5'-P-CCNC: 27 U/L (ref 5–45)
BACTERIA BLD CULT: NORMAL
BACTERIA BLD CULT: NORMAL
BACTERIA WND AEROBE CULT: NO GROWTH
BILIRUB SERPL-MCNC: 0.2 MG/DL (ref 0.2–1)
BUN SERPL-MCNC: 13 MG/DL (ref 5–25)
CALCIUM SERPL-MCNC: 8.6 MG/DL (ref 8.3–10.1)
CHLORIDE SERPL-SCNC: 102 MMOL/L (ref 100–108)
CO2 SERPL-SCNC: 29 MMOL/L (ref 21–32)
CREAT SERPL-MCNC: 0.83 MG/DL (ref 0.6–1.3)
ERYTHROCYTE [DISTWIDTH] IN BLOOD BY AUTOMATED COUNT: 11.9 % (ref 11.6–15.1)
GFR SERPL CREATININE-BSD FRML MDRD: 79 ML/MIN/1.73SQ M
GLUCOSE SERPL-MCNC: 100 MG/DL (ref 65–140)
GLUCOSE SERPL-MCNC: 115 MG/DL (ref 65–140)
GLUCOSE SERPL-MCNC: 88 MG/DL (ref 65–140)
GLUCOSE SERPL-MCNC: 93 MG/DL (ref 65–140)
GLUCOSE SERPL-MCNC: 99 MG/DL (ref 65–140)
GRAM STN SPEC: NORMAL
GRAM STN SPEC: NORMAL
HCT VFR BLD AUTO: 36.7 % (ref 34.8–46.1)
HGB BLD-MCNC: 12.2 G/DL (ref 11.5–15.4)
MCH RBC QN AUTO: 32.5 PG (ref 26.8–34.3)
MCHC RBC AUTO-ENTMCNC: 33.2 G/DL (ref 31.4–37.4)
MCV RBC AUTO: 98 FL (ref 82–98)
PLATELET # BLD AUTO: 333 THOUSANDS/UL (ref 149–390)
PMV BLD AUTO: 8.5 FL (ref 8.9–12.7)
POTASSIUM SERPL-SCNC: 4.6 MMOL/L (ref 3.5–5.3)
PROT SERPL-MCNC: 6.2 G/DL (ref 6.4–8.2)
RBC # BLD AUTO: 3.75 MILLION/UL (ref 3.81–5.12)
SODIUM SERPL-SCNC: 137 MMOL/L (ref 136–145)
WBC # BLD AUTO: 4.59 THOUSAND/UL (ref 4.31–10.16)

## 2018-07-07 PROCEDURE — 82948 REAGENT STRIP/BLOOD GLUCOSE: CPT

## 2018-07-07 PROCEDURE — 85027 COMPLETE CBC AUTOMATED: CPT | Performed by: INTERNAL MEDICINE

## 2018-07-07 PROCEDURE — 80053 COMPREHEN METABOLIC PANEL: CPT | Performed by: INTERNAL MEDICINE

## 2018-07-07 PROCEDURE — 99232 SBSQ HOSP IP/OBS MODERATE 35: CPT | Performed by: INTERNAL MEDICINE

## 2018-07-07 RX ORDER — CEPHALEXIN 250 MG/1
500 CAPSULE ORAL EVERY 6 HOURS SCHEDULED
Status: DISCONTINUED | OUTPATIENT
Start: 2018-07-07 | End: 2018-07-09 | Stop reason: HOSPADM

## 2018-07-07 RX ORDER — CEPHALEXIN 500 MG/1
500 CAPSULE ORAL 4 TIMES DAILY
Qty: 48 CAPSULE | Refills: 0 | Status: CANCELLED | OUTPATIENT
Start: 2018-07-07 | End: 2018-07-19

## 2018-07-07 RX ADMIN — HEPARIN SODIUM 5000 UNITS: 5000 INJECTION, SOLUTION INTRAVENOUS; SUBCUTANEOUS at 23:02

## 2018-07-07 RX ADMIN — ATENOLOL 50 MG: 50 TABLET ORAL at 08:39

## 2018-07-07 RX ADMIN — SERTRALINE HYDROCHLORIDE 100 MG: 100 TABLET ORAL at 08:39

## 2018-07-07 RX ADMIN — RISPERIDONE 1 MG: 1 TABLET ORAL at 20:28

## 2018-07-07 RX ADMIN — PANTOPRAZOLE SODIUM 40 MG: 40 TABLET, DELAYED RELEASE ORAL at 05:15

## 2018-07-07 RX ADMIN — BUPROPION HYDROCHLORIDE 300 MG: 300 TABLET, FILM COATED, EXTENDED RELEASE ORAL at 08:39

## 2018-07-07 RX ADMIN — ZIPRASIDONE HCL 60 MG: 20 CAPSULE ORAL at 20:28

## 2018-07-07 RX ADMIN — CEFAZOLIN SODIUM 2000 MG: 2 SOLUTION INTRAVENOUS at 01:32

## 2018-07-07 RX ADMIN — HEPARIN SODIUM 5000 UNITS: 5000 INJECTION, SOLUTION INTRAVENOUS; SUBCUTANEOUS at 05:15

## 2018-07-07 RX ADMIN — PRAVASTATIN SODIUM 20 MG: 20 TABLET ORAL at 17:10

## 2018-07-07 RX ADMIN — CEPHALEXIN 500 MG: 250 CAPSULE ORAL at 23:02

## 2018-07-07 RX ADMIN — HEPARIN SODIUM 5000 UNITS: 5000 INJECTION, SOLUTION INTRAVENOUS; SUBCUTANEOUS at 14:22

## 2018-07-07 RX ADMIN — CARBAMAZEPINE 600 MG: 200 TABLET ORAL at 20:27

## 2018-07-07 RX ADMIN — CEFAZOLIN SODIUM 2000 MG: 2 SOLUTION INTRAVENOUS at 08:39

## 2018-07-07 RX ADMIN — CEPHALEXIN 500 MG: 250 CAPSULE ORAL at 17:10

## 2018-07-07 RX ADMIN — CEPHALEXIN 500 MG: 250 CAPSULE ORAL at 12:20

## 2018-07-07 NOTE — ASSESSMENT & PLAN NOTE
Sepsis was present on admission due to right foot infection and resolved with IV fluids and antibiotics

## 2018-07-07 NOTE — PROGRESS NOTES
Progress Note - Podiatry  Tammy Eugene 62 y o  female MRN: [id number]  Unit/Bed#: [address] [place] [address] 201-01 Encounter: [id number]    Assessment/Plan:  Cellulitis right foot   -satisfactorily resolved  -continue IV antibiotics per Infectious Disease   -should be good for discharge this Monday, VNA will be required for wound care at home  Ulceration right foot SQ depth/DM 2 with ulcer              -POD #1 S/P I&D   -wound irrigated with 200 cc normal saline solution and packed with 12 inches of 1 inch iodoform    -margins healthy with no active necrosis noted  -vac dressing will be ordered too bedside for application tomorrow  Diabetes 2 with neuropathy  Diabetes 2 with Charcot changes of the midfoot                  Subjective/Objective   Chief Complaint:   Chief Complaint   Patient presents with    Follow-Up Cellulitis     Patient states she was seen here Thursday for cellulitis on her R foot  Patient states that her foot is getting worse  Patient has a blister and an open area on the bottom of her foot  R foot swollena nd R and open area on the bottom of her foot       Subjective:  Relates no pain or discomfort from her foot  Denies any new pain or discomfort or shortness of breath  Relates noticing little less swelling in her foot this morning  Denies any shortness of breath or new pain and discomfort  Blood pressure 119/69, pulse (!) 54, temperature 98 3 °F (36 8 °C), temperature source Oral, resp  rate 17, height 5' 5" (1 651 m), weight 98 3 kg (216 lb 11 4 oz), SpO2 96 %  ,Body mass index is 36 06 kg/m²      Invasive Devices     Peripheral Intravenous Line            Peripheral IV 07/06/18 Right Wrist 1 day                Physical Exam:   General appearance: alert, cooperative and no distress  Neuro/Vascular: Right: DP & PT Palpable, Left: DP & PT Palpable,    Capillary Filling:  Immediate, Edema:  +1 right foot, and +0 left foot  Extremity:  Surgical wound right foot submetatarsal #1 measuring 4 0 x 1 5 x 3 5 cm  Wound margins are healthy, pink, bleeding good capillary fill  No active necrosis noted, cellulitis has resolved, Edema and calor almost completely resolved  Labs and other studies:   CBC w/diff    Results from last 7 days  Lab Units 07/07/18  0437  07/02/18  0758   WBC Thousand/uL 4 59  < > 8 57   HEMOGLOBIN g/dL 12 2  < > 14 2   HEMATOCRIT % 36 7  < > 40 1   PLATELETS Thousands/uL 333  < > 246   NEUTROS PCT %  --   --  83*   LYMPHS PCT %  --   --  7*   MONOS PCT %  --   --  7   EOS PCT %  --   --  2   < > = values in this interval not displayed  BMP    Results from last 7 days  Lab Units 07/07/18  0437   SODIUM mmol/L 137   POTASSIUM mmol/L 4 6   CHLORIDE mmol/L 102   CO2 mmol/L 29   BUN mg/dL 13   CREATININE mg/dL 0 83   GLUCOSE RANDOM mg/dL 93   CALCIUM mg/dL 8 6     CMP    Results from last 7 days  Lab Units 07/07/18  0437   SODIUM mmol/L 137   POTASSIUM mmol/L 4 6   CHLORIDE mmol/L 102   CO2 mmol/L 29   BUN mg/dL 13   CREATININE mg/dL 0 83   CALCIUM mg/dL 8 6   TOTAL PROTEIN g/dL 6 2*   BILIRUBIN TOTAL mg/dL 0 20   ALK PHOS U/L 99   ALT U/L 30   AST U/L 27   GLUCOSE RANDOM mg/dL 93       @Culture@  Lab Results   Component Value Date    BLOODCX No Growth After 5 Days  07/02/2018    BLOODCX No Growth After 5 Days   07/02/2018    URINECX 70,000-79,000 cfu/ml Mixed Contaminants X6 08/10/2016         Current Facility-Administered Medications:     acetaminophen (TYLENOL) tablet 650 mg, 650 mg, Oral, Q6H PRN, Ravi Valadez MD    aluminum-magnesium hydroxide-simethicone (MYLANTA) 200-200-20 mg/5 mL oral suspension 30 mL, 30 mL, Oral, Q4H PRN, Eveline Merino DO    atenolol (TENORMIN) tablet 50 mg, 50 mg, Oral, Daily, Ravi Valadez MD, 50 mg at 07/07/18 0839    bacitracin 100,000 Units, neomycin-polymyxin B (NEOSPORIN-) 1 mL in sodium chloride 0 9 % 1,000 mL irrigation bag, , Irrigation, Once, Mark Luna DPM    buPROPion (WELLBUTRIN XL) 24 hr tablet 300 mg, 300 mg, Oral, Daily, Bernie Abarca MD, 300 mg at 07/07/18 2054    carBAMazepine (TEGretol) tablet 600 mg, 600 mg, Oral, Daily With Cecelia Durantte Fly, DO, 600 mg at 07/06/18 2013    cephalexin (KEFLEX) capsule 500 mg, 500 mg, Oral, Q6H [address], Bernie Abarca MD, 500 mg at 07/07/18 1220    heparin (porcine) subcutaneous injection 5,000 Units, 5,000 Units, Subcutaneous, Q8H [address], 5,000 Units at 07/07/18 0515 **AND** [CANCELED] Platelet count, , , Once, Bernie Abarca MD    insulin lispro (HumaLOG) 100 units/mL subcutaneous injection 1-5 Units, 1-5 Units, Subcutaneous, TID AC, 1 Units at 07/02/18 1400 **AND** Fingerstick Glucose (POCT), , , TID AC, Bernie Abarca MD    nicotine (NICODERM CQ) 7 mg/24hr TD 24 hr patch 1 patch, 1 patch, Transdermal, Daily, Bernie Abarca MD    [facility]ansetron [facility]) injection 4 mg, 4 mg, Intravenous, Q6H PRN, Bernie Abarca MD, 4 mg at 07/06/18 1532    pantoprazole (PROTONIX) EC tablet 40 mg, 40 mg, Oral, Early Morning, Eveline Fly, DO, 40 mg at 07/07/18 0515    pravastatin (PRAVACHOL) tablet 20 mg, 20 mg, Oral, Daily With Hazel Chamorro MD, 20 mg at 07/05/18 1732    risperiDONE (RisperDAL) tablet 1 mg, 1 mg, Oral, HS, Eveline Fly, DO, 1 mg at 07/06/18 2013    sertraline (ZOLOFT) tablet 100 mg, 100 mg, Oral, Daily, Bernie Abarca MD, 100 mg at 07/07/18 1739    ziprasidone (GEODON) capsule 60 mg, 60 mg, Oral, HS, Eveline Fly, DO, 60 mg at 07/06/18 2013    Imaging: I have personally reviewed pertinent films in PACS  EKG, Pathology, and Other Studies: I have personally reviewed pertinent reports    VTE Pharmacologic Prophylaxis: Heparin  VTE Mechanical Prophylaxis: sequential compression device    Ricardo Izaguirre DPM

## 2018-07-07 NOTE — PROGRESS NOTES
Progress Note - Tammy Eugene 1960, 62 y o  female MRN: [id number]    Unit/Bed#: [address] [place] [address] Encounter: [id number]    Primary Care Provider: Hailey العراقي DO   Date and time admitted to hospital: 2018  7:17 AM        * Cellulitis of right lower extremity   Assessment & Plan    Patient was admitted with right foot cellulitis and abscess  Underwent surgery by Podiatry  Postoperative course was uneventful  Patient was treated with IV Ancef after cultures came back positive for MSSA  Patient will follow up with Podiatry and will be taking p o  Keflex as an outpatient        Sepsis [facility])   Assessment & Plan    Sepsis was present on admission due to right foot infection and resolved with IV fluids and antibiotics        Type 2 diabetes mellitus with diabetic neuropathy, without long-term current use of insulin ([facility])   Assessment & Plan    Blood sugars are well controlled with diet only that she will continue in the outpatient setting        Bipolar I disorder, most recent episode mixed, severe without psychotic features [facility])   Assessment & Plan    She is stable on her Geodon, Risperdal, tegretol, sertraline         Essential hypertension   Assessment & Plan    Blood pressure is controlled on atenolol  Hydrochlorothiazide was discontinued due to hyponatremia  Today patient's lungs are clear to auscultation heart is regular rhythm         Hyponatremia   Assessment & Plan    Resolved off hydrochlorothiazide            VTE Prophylaxis: in place    Patient Centered Rounds: I rounded with patient's nurse    Current Length of Stay: 5 day(s)    Current Patient Status: Inpatient    Certification Statement: Pt requires additional inpatient hospital stay due to: see assessment and plan        Subjective:   Patient had no fever, denies right foot pain    Denies chest pain, shortness of breath, diarrhea or rash    All other ROS are negative    Objective:     Vitals:   Temp (24hrs), Av 1 °F (36 7 °C), Min:97 7 °F (36 5 °C), Max:98 9 °F (37 2 °C)    HR:  [54-66] 54  Resp:  [14-20] 17  BP: ()/(50-90) 119/69  SpO2:  [94 %-99 %] 96 %  Body mass index is 36 06 kg/m²  Input and Output Summary (last 24 hours): Intake/Output Summary (Last 24 hours) at 07/07/18 0957  Last data filed at 07/06/18 1537   Gross per 24 hour   Intake              850 ml   Output               10 ml   Net              840 ml       Physical Exam:     Physical Exam   Constitutional: She is oriented to person, place, and time  She appears well-developed  No distress  HENT:   Head: Normocephalic  Mouth/Throat: Oropharynx is clear and moist    Eyes: Conjunctivae are normal    Neck: Neck supple  Cardiovascular: Normal rate and regular rhythm  Pulmonary/Chest: Effort normal  No respiratory distress  She has no wheezes  She has no rales  Abdominal: Soft  Bowel sounds are normal  She exhibits no distension  There is no tenderness  Musculoskeletal: She exhibits no tenderness  Right foot is dressed   Lymphadenopathy:     She has no cervical adenopathy  Neurological: She is alert and oriented to person, place, and time  No cranial nerve deficit  Skin: Skin is warm and dry  No rash noted  Psychiatric: She has a normal mood and affect  Vitals reviewed  I personally reviewed labs and imaging reports for today        Last 24 Hours Medication List:     Current Facility-Administered Medications:  acetaminophen 650 mg Oral Q6H PRN Raul Emanuel MD   aluminum-magnesium hydroxide-simethicone 30 mL Oral Q4H PRN Eveline Fly, DO   atenolol 50 mg Oral Daily Raul Emanuel MD   bacitracin 100,000 units and neomycin-polymyxin B () 1 mL in sodium chloride 0 9% 1000 mL irrigation bag  Irrigation Once [place] Lipoma, DPM   buPROPion 300 mg Oral Daily Raul Emanuel MD   carBAMazepine 600 mg Oral Daily With Marco Villa, DO   cephalexin 500 mg Oral Q6H Luca Khan MD   heparin (porcine) 5,000 Units Subcutaneous [facility] Medina Knutson MD   insulin lispro 1-5 Units Subcutaneous TID Lulú Babcock MD   nicotine 1 patch Transdermal Daily Medina Knutson MD   ondansetron 4 mg Intravenous Q6H PRN Medina Knutson MD   pantoprazole 40 mg Oral Early Morning Eveline Fly, DO   pravastatin 20 mg Oral Daily With Carter Babcock MD   risperiDONE 1 mg Oral HS Eveline Fly, DO   sertraline 100 mg Oral Daily Medina Knutson MD   ziprasidone 60 mg Oral HS [place], DO          Today, Patient Was Seen By: Medina Knutson MD    ** Please Note: Dictation voice to text software may have been used in the creation of this document   **

## 2018-07-07 NOTE — ASSESSMENT & PLAN NOTE
Patient was admitted with right foot cellulitis and abscess  Underwent surgery by Podiatry  Postoperative course was uneventful  Patient was treated with IV Ancef after cultures came back positive for MSSA  Patient will follow up with Podiatry and will be taking p o   Keflex as an outpatient

## 2018-07-08 PROBLEM — A41.9 SEPSIS (HCC): Status: RESOLVED | Noted: 2018-07-02 | Resolved: 2018-07-08

## 2018-07-08 LAB
GLUCOSE SERPL-MCNC: 100 MG/DL (ref 65–140)
GLUCOSE SERPL-MCNC: 105 MG/DL (ref 65–140)
GLUCOSE SERPL-MCNC: 112 MG/DL (ref 65–140)
GLUCOSE SERPL-MCNC: 158 MG/DL (ref 65–140)

## 2018-07-08 PROCEDURE — 82948 REAGENT STRIP/BLOOD GLUCOSE: CPT

## 2018-07-08 PROCEDURE — 99232 SBSQ HOSP IP/OBS MODERATE 35: CPT | Performed by: INTERNAL MEDICINE

## 2018-07-08 RX ADMIN — CEPHALEXIN 500 MG: 250 CAPSULE ORAL at 23:09

## 2018-07-08 RX ADMIN — RISPERIDONE 1 MG: 1 TABLET ORAL at 20:29

## 2018-07-08 RX ADMIN — HEPARIN SODIUM 5000 UNITS: 5000 INJECTION, SOLUTION INTRAVENOUS; SUBCUTANEOUS at 05:03

## 2018-07-08 RX ADMIN — PRAVASTATIN SODIUM 20 MG: 20 TABLET ORAL at 17:08

## 2018-07-08 RX ADMIN — SERTRALINE HYDROCHLORIDE 100 MG: 100 TABLET ORAL at 08:30

## 2018-07-08 RX ADMIN — PANTOPRAZOLE SODIUM 40 MG: 40 TABLET, DELAYED RELEASE ORAL at 05:04

## 2018-07-08 RX ADMIN — BUPROPION HYDROCHLORIDE 300 MG: 300 TABLET, FILM COATED, EXTENDED RELEASE ORAL at 08:30

## 2018-07-08 RX ADMIN — HEPARIN SODIUM 5000 UNITS: 5000 INJECTION, SOLUTION INTRAVENOUS; SUBCUTANEOUS at 23:10

## 2018-07-08 RX ADMIN — HEPARIN SODIUM 5000 UNITS: 5000 INJECTION, SOLUTION INTRAVENOUS; SUBCUTANEOUS at 14:35

## 2018-07-08 RX ADMIN — CARBAMAZEPINE 600 MG: 200 TABLET ORAL at 20:29

## 2018-07-08 RX ADMIN — ATENOLOL 50 MG: 50 TABLET ORAL at 08:30

## 2018-07-08 RX ADMIN — CEPHALEXIN 500 MG: 250 CAPSULE ORAL at 17:08

## 2018-07-08 RX ADMIN — CEPHALEXIN 500 MG: 250 CAPSULE ORAL at 05:04

## 2018-07-08 RX ADMIN — ZIPRASIDONE HCL 60 MG: 20 CAPSULE ORAL at 20:29

## 2018-07-08 RX ADMIN — CEPHALEXIN 500 MG: 250 CAPSULE ORAL at 11:49

## 2018-07-08 NOTE — PROGRESS NOTES
Progress Note - Podiatry  Tammy Eugene 62 y o  female MRN: [id number]  Unit/Bed#: [address] [place] 201-01 Encounter: [id number]    Assessment/Plan:  Cellulitis right foot   -satisfactorily resolved  -continue IV antibiotics per Infectious Disease  Ulceration right foot SQ depth/DM 2 with ulcer              -POD #2 S/P I&D   -wound irrigated with 200 cc normal saline solution and packed with 12 inches of 1 inch iodoform    -margins healthy with no active necrosis noted  -ok for discharge this Monday with VNA to irrigate and pack the wound daily until home VAC is delivered  -VAC dressing applied and was determined to be defective and would not hold seal      -No other Hospital Wound VAC's were available today  Diabetes 2 with neuropathy  Diabetes 2 with Charcot changes of the midfoot                  Subjective/Objective   Chief Complaint:   Chief Complaint   Patient presents with    Follow-Up Cellulitis     Patient states she was seen here Thursday for cellulitis on her R foot  Patient states that her foot is getting worse  Patient has a blister and an open area on the bottom of her foot  R foot swollena nd R and open area on the bottom of her foot       Subjective:  Relates no pain or discomfort from her foot  Denies any shortness of breath or new pain and discomfort  Anxious to go home soon as she can  Blood pressure 147/67, pulse (!) 54, temperature 97 8 °F (36 6 °C), temperature source Oral, resp  rate 18, height 5' 5" (1 651 m), weight 98 3 kg (216 lb 11 4 oz), SpO2 95 %  ,Body mass index is 36 06 kg/m²      Invasive Devices     Peripheral Intravenous Line            Peripheral IV 07/06/18 Right Wrist 2 days                Physical Exam:   General appearance: alert, cooperative and no distress  Neuro/Vascular: Right: DP & PT Palpable, Left: DP & PT Palpable,    Capillary Filling:  Immediate, Edema:  +1 right foot, and +0 left foot  Extremity:  Surgical wound right foot submetatarsal #1 measuring 4 0 x 1 5 x 3 5 cm  Wound margins are healthy, red granular base and margins which have improved over the past 24 hours, minimal bleeding with good capillary fill  No active necrosis noted, cellulitis has resolved, minimal edema and no calor noted  Labs and other studies:   CBC w/diff    Results from last 7 days  Lab Units 07/07/18  0437  07/02/18  0758   WBC Thousand/uL 4 59  < > 8 57   HEMOGLOBIN g/dL 12 2  < > 14 2   HEMATOCRIT % 36 7  < > 40 1   PLATELETS Thousands/uL 333  < > 246   NEUTROS PCT %  --   --  83*   LYMPHS PCT %  --   --  7*   MONOS PCT %  --   --  7   EOS PCT %  --   --  2   < > = values in this interval not displayed  BMP    Results from last 7 days  Lab Units 07/07/18  0437   SODIUM mmol/L 137   POTASSIUM mmol/L 4 6   CHLORIDE mmol/L 102   CO2 mmol/L 29   BUN mg/dL 13   CREATININE mg/dL 0 83   GLUCOSE RANDOM mg/dL 93   CALCIUM mg/dL 8 6     CMP    Results from last 7 days  Lab Units 07/07/18  0437   SODIUM mmol/L 137   POTASSIUM mmol/L 4 6   CHLORIDE mmol/L 102   CO2 mmol/L 29   BUN mg/dL 13   CREATININE mg/dL 0 83   CALCIUM mg/dL 8 6   TOTAL PROTEIN g/dL 6 2*   BILIRUBIN TOTAL mg/dL 0 20   ALK PHOS U/L 99   ALT U/L 30   AST U/L 27   GLUCOSE RANDOM mg/dL 93       @Culture@  Lab Results   Component Value Date    BLOODCX No Growth After 5 Days  07/02/2018    BLOODCX No Growth After 5 Days   07/02/2018    URINECX 70,000-79,000 cfu/ml Mixed Contaminants X6 08/10/2016         Current Facility-Administered Medications:     acetaminophen (TYLENOL) tablet 650 mg, 650 mg, Oral, Q6H PRN, Nikos Agarwal MD    aluminum-magnesium hydroxide-simethicone (MYLANTA) 200-200-20 mg/5 mL oral suspension 30 mL, 30 mL, Oral, Q4H PRN, Eveline Merino,     atenolol (TENORMIN) tablet 50 mg, 50 mg, Oral, Daily, Nikos Agarwal MD, 50 mg at 07/08/18 0830    bacitracin 100,000 Units, neomycin-polymyxin B (NEOSPORIN-) 1 mL in sodium chloride 0 9 % 1,000 mL irrigation bag, , Irrigation, Once, Jose Luis Sethi DPM   buPROPion (WELLBUTRIN XL) 24 hr tablet 300 mg, 300 mg, Oral, Daily, Fe Alfredo MD, 300 mg at 07/08/18 0830    carBAMazepine (TEGretol) tablet 600 mg, 600 mg, Oral, Daily With Elicia Ramos DO, 600 mg at 07/07/18 2027    cephalexin (KEFLEX) capsule 500 mg, 500 mg, Oral, Q6H [facility], Fe Alfredo MD, 500 mg at 07/08/18 1149    heparin (porcine) subcutaneous injection 5,000 Units, 5,000 Units, Subcutaneous, Q8H [facility], 5,000 Units at 07/08/18 1435 **AND** [CANCELED] Platelet count, , , Once, Fe Alfredo MD    insulin lispro (HumaLOG) 100 units/mL subcutaneous injection 1-5 Units, 1-5 Units, Subcutaneous, TID AC, 1 Units at 07/02/18 1400 **AND** Fingerstick Glucose (POCT), , , TID AC, Fe Alfredo MD    nicotine (NICODERM CQ) 7 mg/24hr TD 24 hr patch 1 patch, 1 patch, Transdermal, Daily, Fe Alfredo MD, Stopped at 07/08/18 0900    ondansetron (ZOFRAN) injection 4 mg, 4 mg, Intravenous, Q6H PRN, Fe Alfredo MD, 4 mg at 07/06/18 1532    pantoprazole (PROTONIX) EC tablet 40 mg, 40 mg, Oral, Early Morning, Eveline Merino DO, 40 mg at 07/08/18 0504    pravastatin (PRAVACHOL) tablet 20 mg, 20 mg, Oral, Daily With Nannette Mayberry MD, 20 mg at 07/07/18 1710    risperiDONE (RisperDAL) tablet 1 mg, 1 mg, Oral, HS, Eveline Merino DO, 1 mg at 07/07/18 2028    sertraline (ZOLOFT) tablet 100 mg, 100 mg, Oral, Daily, Fe Alfredo MD, 100 mg at 07/08/18 0830    ziprasidone (GEODON) capsule 60 mg, 60 mg, Oral, HS, Eveline Merino DO, 60 mg at 07/07/18 2028    Imaging: I have personally reviewed pertinent films in PACS  EKG, Pathology, and Other Studies: I have personally reviewed pertinent reports    VTE Pharmacologic Prophylaxis: Heparin  VTE Mechanical Prophylaxis: sequential compression device    Naheed Robert DPM

## 2018-07-08 NOTE — DISCHARGE INSTR - AVS FIRST PAGE
Wound care/VNA instructions:  Right foot, irrigate the wound with 200 cc normal saline solution, gently packed with 1/2 inch AMD packing, cover with Adaptic and ABDs with Kavon/Kerlix  This should be done until portable VAC dressing has been approved through wound care  Patient should remain non weight-bearing on right foot

## 2018-07-08 NOTE — PROGRESS NOTES
Progress Note - Kavita Ramirez 62 y o  female MRN: [id number]    Unit/Bed#: [address] [place] [address] 201-01 Encounter: [id number]      Assessment:  Principal Problem:    Cellulitis of right lower extremity  Active Problems:    Diabetic infection of right foot ([facility] [address] )    Essential hypertension    Hyponatremia    Type 2 diabetes mellitus with diabetic neuropathy, without long-term current use of insulin ([facility])    Bipolar I disorder, most recent episode mixed, severe without psychotic features ([facility] [address] )    Charcot's joint of foot    Hyperlipidemia  Resolved Problems:    Sepsis ([facility] )        Plan:  · Right lower extremity cellulitis with diabetic foot abscess-postop day 2  Drainage of abscess-as per podiatry/Dr Judi Robb will plan for discharge tomorrow July 9, 2018-day 7  Of IV antibiotics to be continued on oral cephalexin as an outpatient  · Diabetes mellitus type 2-well control-fasting blood sugar 105  · Charcot foot-recommendations as per podiatry  · Hypertension-mild systolic elevations today will follow    Subjective:   Patient without pain in her foot as she does have significant neuropathy  No complaints of shortness of breath or lightheadedness  ROS  Comprehensive system review negative other than noted above    Objective:     Vitals: Blood pressure 147/67, pulse (!) 54, temperature 97 8 °F (36 6 °C), temperature source Oral, resp  rate 18, height 5' 5" (1 651 m), weight 98 3 kg (216 lb 11 4 oz), SpO2 95 %  ,Body mass index is 36 06 kg/m²    Current Facility-Administered Medications   Medication Dose Route Frequency Provider Last Rate Last Dose    acetaminophen (TYLENOL) tablet 650 mg  650 mg Oral Q6H PRN Alka Granger MD        aluminum-magnesium hydroxide-simethicone (MYLANTA) 200-200-20 mg/5 mL oral suspension 30 mL  30 mL Oral Q4H PRN Eveline Merino, DO        atenolol (TENORMIN) tablet 50 mg  50 mg Oral Daily Alka Granger MD   50 mg at 07/08/18 0830    bacitracin 100,000 Units, neomycin-polymyxin B (NEOSPORIN-) 1 mL in sodium chloride 0 9 % 1,000 mL irrigation bag   Irrigation Once Mini Luo, DPM        buPROPion (WELLBUTRIN XL) 24 hr tablet 300 mg  300 mg Oral Daily Jonathon Moore MD   300 mg at 18 0830    carBAMazepine (TEGretol) tablet 600 mg  600 mg Oral Daily With Marco Tan Sons, DO   600 mg at 18    cephalexin (KEFLEX) capsule 500 mg  500 mg Oral Q6H Bernadette Vazquez MD   500 mg at 18 0504    heparin (porcine) subcutaneous injection 5,000 Units  5,000 Units Subcutaneous [facility] Jonathon Moore MD   5,000 Units at 18 0503    insulin lispro (HumaLOG) 100 units/mL subcutaneous injection 1-5 Units  1-5 Units Subcutaneous TID AC Jonathon Moore MD   1 Units at 18 1400    nicotine (NICODERM CQ) 7 mg/24hr TD 24 hr patch 1 patch  1 patch Transdermal Daily Jonathon Moore MD   Stopped at 18 0900    ondansetron (ZOFRAN) injection 4 mg  4 mg Intravenous Q6H PRN Jonathon Moore MD   4 mg at 18 1532    pantoprazole (PROTONIX) EC tablet 40 mg  40 mg Oral Early Morning Eveline Fly, DO   40 mg at 18 0504    pravastatin (PRAVACHOL) tablet 20 mg  20 mg Oral Daily With Maeve Castelan MD   20 mg at 18 1710    risperiDONE (RisperDAL) tablet 1 mg  1 mg Oral HS Eveline Fly, DO   1 mg at 18    sertraline (ZOLOFT) tablet 100 mg  100 mg Oral Daily Jonathon Moore MD   100 mg at 18 0830    ziprasidone (GEODON) capsule 60 mg  60 mg Oral HS Eveline Fly, DO   60 mg at 18     Prescriptions Prior to Admission   Medication    atenolol-chlorthalidone (TENORETIC) 50-25 mg per tablet    buPROPion (WELLBUTRIN XL) 300 mg 24 hr tablet    carBAMazepine (EPITOL) 200 mg tablet    [] cephalexin (KEFLEX) 500 mg capsule    Omega-3 Fatty Acids (FISH OIL) 1,000 mg    risperiDONE (RisperDAL) 1 mg tablet    sertraline (ZOLOFT) 100 mg tablet    simvastatin (ZOCOR) 20 mg tablet    sulfamethoxazole-trimethoprim (BACTRIM DS) 800-160 mg per tablet    ziprasidone (GEODON) 60 mg capsule       No intake or output data in the 24 hours ending 07/08/18 1025    Physical Exam:  General appearance: alert and cooperative  Neck: no adenopathy, no JVD, supple, symmetrical, trachea midline and thyroid not enlarged, symmetric, no tenderness/mass/nodules  Lungs: clear to auscultation bilaterally  Heart: regular rate and rhythm, S1, S2 normal, no murmur, click, rub or gallop  Abdomen: soft, non-tender; bowel sounds normal; no masses,  no organomegaly  Extremities:  Dressings in place on  right foot  No calf tenderness  Skin: Skin color, texture, turgor normal  No rashes or lesions  Neurologic:  Decreased sensation distally in both feet and lower legs       Lab, Imaging and other studies: I have personally reviewed pertinent reports  Results from last 7 days  Lab Units 07/07/18  0437 07/03/18  0500 07/02/18  0758   WBC Thousand/uL 4 59 4 69 8 57   HEMOGLOBIN g/dL 12 2 12 6 14 2   HEMATOCRIT % 36 7 37 1 40 1   PLATELETS Thousands/uL 333 255 246   NEUTROS PCT %  --   --  83*   LYMPHS PCT %  --   --  7*   MONOS PCT %  --   --  7   EOS PCT %  --   --  2       Results from last 7 days  Lab Units 07/07/18  0437 07/03/18  0500 07/02/18  0758   SODIUM mmol/L 137 136 131*   POTASSIUM mmol/L 4 6 3 9 4 3   CHLORIDE mmol/L 102 101 92*   CO2 mmol/L 29 27 29   BUN mg/dL 13 12 12   CREATININE mg/dL 0 83 0 96 0 85   CALCIUM mg/dL 8 6 8 4 8 9   TOTAL PROTEIN g/dL 6 2*  --  7 8   BILIRUBIN TOTAL mg/dL 0 20  --  0 50   ALK PHOS U/L 99  --  145*   ALT U/L 30  --  53   AST U/L 27  --  46*   GLUCOSE RANDOM mg/dL 93 112 133     No results found for: TROPONINI, CKMB, CKTOTAL    Results from last 7 days  Lab Units 07/02/18  0758   INR  1 18*     Lab Results   Component Value Date    BLOODCX No Growth After 5 Days  07/02/2018    BLOODCX No Growth After 5 Days   07/02/2018    URINECX 70,000-79,000 cfu/ml Mixed Contaminants X6 08/10/2016    WOUNDCULT No growth 07/06/2018    WOUNDCULT No growth 07/04/2018    WOUNDCULT 1 colony Staphylococcus aureus (A) 07/02/2018       Imaging:  No results found for this or any previous visit  Results for orders placed in visit on 08/05/15   XR chest pa & lateral    Narrative CHEST - DUAL ENERGY   INDICATION-  Preoperative chest x-ray  COMPARISON-  3/20/2012   VIEWS-  PA (including soft tissue/bone algorithms) and lateral   projections    4 images   FINDINGS-        Cardiomediastinal silhouette appears unremarkable  The lungs are clear  No pneumothorax or pleural effusion  Visualized osseous structures appear within normal limits for the   patient's age  IMPRESSION-   No active pulmonary disease  Transcribed on- [id number]           Raynell Kanner, RAD MD        Reading Radiologist- SHERITA Jj MD        Electronically Signed- SHERITA Jj MD        Released Date Time- 08/05/15 6910      ------------------------------------------------------------------------------   Ciro CHAVEZ        PATIENT CENTERED ROUNDS: I have performed rounds with the nursing staff            Rachel Burton,

## 2018-07-09 VITALS
HEIGHT: 65 IN | DIASTOLIC BLOOD PRESSURE: 56 MMHG | RESPIRATION RATE: 18 BRPM | WEIGHT: 217.37 LBS | SYSTOLIC BLOOD PRESSURE: 119 MMHG | OXYGEN SATURATION: 96 % | HEART RATE: 62 BPM | BODY MASS INDEX: 36.22 KG/M2 | TEMPERATURE: 98.3 F

## 2018-07-09 PROBLEM — L08.9 DIABETIC INFECTION OF RIGHT FOOT (HCC): Status: RESOLVED | Noted: 2018-07-02 | Resolved: 2018-07-09

## 2018-07-09 PROBLEM — E11.628 DIABETIC INFECTION OF RIGHT FOOT (HCC): Status: RESOLVED | Noted: 2018-07-02 | Resolved: 2018-07-09

## 2018-07-09 PROBLEM — L03.115 CELLULITIS OF RIGHT LOWER EXTREMITY: Status: RESOLVED | Noted: 2018-07-02 | Resolved: 2018-07-09

## 2018-07-09 LAB
BACTERIA WND AEROBE CULT: ABNORMAL
GLUCOSE SERPL-MCNC: 85 MG/DL (ref 65–140)
GLUCOSE SERPL-MCNC: 98 MG/DL (ref 65–140)
GRAM STN SPEC: ABNORMAL
GRAM STN SPEC: ABNORMAL

## 2018-07-09 PROCEDURE — 99239 HOSP IP/OBS DSCHRG MGMT >30: CPT | Performed by: INTERNAL MEDICINE

## 2018-07-09 PROCEDURE — 82948 REAGENT STRIP/BLOOD GLUCOSE: CPT

## 2018-07-09 RX ORDER — ATENOLOL 50 MG/1
50 TABLET ORAL DAILY
Qty: 30 TABLET | Refills: 0 | Status: SHIPPED | OUTPATIENT
Start: 2018-07-09 | End: 2018-08-01 | Stop reason: SDUPTHER

## 2018-07-09 RX ORDER — CEPHALEXIN 500 MG/1
500 CAPSULE ORAL EVERY 6 HOURS SCHEDULED
Qty: 44 CAPSULE | Refills: 0 | Status: SHIPPED | OUTPATIENT
Start: 2018-07-09 | End: 2018-07-20

## 2018-07-09 RX ADMIN — BUPROPION HYDROCHLORIDE 300 MG: 300 TABLET, FILM COATED, EXTENDED RELEASE ORAL at 08:31

## 2018-07-09 RX ADMIN — CEPHALEXIN 500 MG: 250 CAPSULE ORAL at 05:16

## 2018-07-09 RX ADMIN — HEPARIN SODIUM 5000 UNITS: 5000 INJECTION, SOLUTION INTRAVENOUS; SUBCUTANEOUS at 05:16

## 2018-07-09 RX ADMIN — CEPHALEXIN 500 MG: 250 CAPSULE ORAL at 11:49

## 2018-07-09 RX ADMIN — SERTRALINE HYDROCHLORIDE 100 MG: 100 TABLET ORAL at 08:31

## 2018-07-09 RX ADMIN — ATENOLOL 50 MG: 50 TABLET ORAL at 08:31

## 2018-07-09 RX ADMIN — PANTOPRAZOLE SODIUM 40 MG: 40 TABLET, DELAYED RELEASE ORAL at 05:16

## 2018-07-09 NOTE — DISCHARGE SUMMARY
Discharge Summary - Tammy Eugene 62 y o  female MRN: [id number]  Unit/Bed#: [address] [place] [address] Encounter: [id number]    Admission Date:    7/2/2018   Discharge Date:   07/09/18   Admitting Diagnosis:   Cellulitis [L03 90]  Diabetic infection of right foot ([address] ) [E71 853, L08 9]  Failure of outpatient treatment [Z78 9]  Admitting Provider:   Ravi Valadez MD  Discharge Provider:   Jane Durant MD     Primary Care Physician at Discharge:   Hailey العراقي, [id number]    HPI:   60-year-old female who presented to the emergency department with right foot swelling and fever  For a detailed HPI please refer to the admission note  Procedures Performed:   No orders of the defined types were placed in this encounter  Hospital Course:   Patient was admitted with sepsis secondary to right lower extremity cellulitis  Patient had failed outpatient Keflex and Bactrim therapy  Patient was started on cefepime and vancomycin  Patient was seen by ID, who recommended MRI of right foot  Patient could not have MRI done due to implanted nerve stimulator and underwent CT of foot which showed plantar fluid collection at the level of 1st tarsometatarsal   Patient's culture grew MSSA and antibiotics were switched to Ancef  Patient went to OR for I and D/ulcer debridement of right foot by Podiatry  Patient underwent the procedure on July 6, 2018  Patient OR cultures remained negative to date  Patient was switched to Keflex  Podiatry follow-up was done and dressing changes were done daily  As per Podiatry patient is okay for discharge with VNA services at home to irrigate and pack the wound daily until home back is delivered  Patient was cleared by Infectious Disease for discharge to complete 2 week course of Keflex from date of OR  Patient was seen by Physical therapy and  and will be discharged home with home care services  Patient is hemodynamically stable for discharge    Follow-up with Dr Nette Singh at Wound Care  Daily dressing changes as per Podiatry recommendations  VAC orders and further management as per Podiatry  Follow-up with PCP as outpatient  Return to ER with any worsening erythema, swelling, fever, chills or intractable pain or any other alarming symptoms  Consulting Providers   Podiatry and Infectious Disease    Complications:   None  Labs:   Lab Results   Component Value Date    WBC 4 59 07/07/2018    WBC 4 50 08/05/2015    RBC 3 75 (L) 07/07/2018    RBC 4 22 08/05/2015    HGB 12 2 07/07/2018    HGB 14 1 08/05/2015    HCT 36 7 07/07/2018    HCT 39 9 08/05/2015    MCV 98 07/07/2018    MCV 95 08/05/2015    MCH 32 5 07/07/2018    MCH 33 4 08/05/2015    RDW 11 9 07/07/2018    RDW 12 5 08/05/2015     07/07/2018     08/05/2015     Lab Results   Component Value Date    CREATININE 0 83 07/07/2018    CREATININE 0 99 08/25/2015    BUN 13 07/07/2018    BUN 12 08/25/2015     07/07/2018     (L) 08/25/2015    K 4 6 07/07/2018    K 4 1 08/25/2015     07/07/2018    CL 93 (L) 08/25/2015    CO2 29 07/07/2018    CO2 30 9 08/25/2015    GLUCOSE 93 07/07/2018    GLUCOSE 105 08/25/2015    PROT 6 2 (L) 07/07/2018    PROT 7 5 05/08/2015    ALKPHOS 99 07/07/2018    ALKPHOS 91 05/08/2015    ALT 30 07/07/2018    ALT 23 05/08/2015    AST 27 07/07/2018    AST 22 05/08/2015       Treatments:  Cefepime, vancomycin-to Ancef-to Keflex, Tegretol, atenolol, nicotine, Protonix, Pravachol, Risperdal, Zoloft, Geodon      Discharge Diagnosis:   Principal Problem:    Cellulitis of right lower extremity  Active Problems:    Bipolar I disorder, most recent episode mixed, severe without psychotic features ([facility] )    Charcot's joint of foot    Essential hypertension    Hyperlipidemia    Hyponatremia    Type 2 diabetes mellitus with diabetic neuropathy, without long-term current use of insulin (HCC)    Diabetic infection of right foot ([facility] )  Resolved Problems:    Sepsis ([facility] )      Condition at Discharge: Good     Code Status: Level 1 - Full Code  Advance Directive and Living Will: <no information>  Power of :    POLST:      Discharge instructions/Information to patient and family:   See after visit summary for information provided to patient and family  Provisions for Follow-Up Care:  See after visit summary for information related to follow-up care and any pertinent home health orders  Disposition:   Home with home care services  Planned Readmission:   No    Discharge Statement   I spent 35 minutes discharging the patient  This time was spent on the day of discharge  I had direct contact with the patient on the day of discharge  Greater than 50% of the total time was spent examining patient, answering all patient questions, arranging and discussing plan of care with patient as well as directly providing post-discharge instructions  Additional time then spent on discharge activities  Discharge Medications:  See after visit summary for reconciled discharge medications provided to patient and family        "This note has been constructed using a voice recognition system"    Diya Morton MD  7/9/2018,10:19 AM

## 2018-07-09 NOTE — DISCHARGE INSTRUCTIONS
Follow-up with Dr Valentino Filter at [address] [facility]  Daily dressing changes as per Podiatry recommendations  VAC orders and further management as per Podiatry  Follow-up with PCP as outpatient  Return to ER with any worsening erythema, swelling, fever, chills or intractable pain or any other alarming symptoms

## 2018-07-09 NOTE — SOCIAL WORK
Continue to follow  Spoke with Pt re: VNA for wound care and packing  Freedom of Choice given  Pt agreeable for [facility]  Referral sent to [facility] via [facility] for SN for wound care  Spoke with Dr Yohana Allison, will be putting wound vac on Pt when she follows up at wound care center this week  Dr Yohana Allison informed  that Pt will need VNA wound care every other day for wound irrigation and packing  Spoke with Balwinder Shea in intake at [facility], informed that Pt will be getting wound vac when she follows up at wound care center  Balwinder Shea informed that Pt will need wound irrigation and packing every other day  [facility] can accept Pt for SN wound care services  Will follow

## 2018-07-09 NOTE — PROGRESS NOTES
Progress Note - Podiatry  Adam Francois 62 y o  female MRN: [id number]  Unit/Bed#: [address] [place] [address] [id number] Encounter: [id number]    Assessment/Plan:  Cellulitis right foot   -satisfactorily resolved  -continue IV antibiotics per Infectious Disease  Ulceration right foot SQ depth/DM 2 with ulcer              -POD #3 S/P I&D   -wound irrigated with 200 cc normal saline solution and packed with 8 inches of 1 inch iodoform    -margins healthy with no active necrosis noted  -ok for discharge today with VNA to irrigate and pack the wound daily until home VAC is delivered  -scheduled for follow-up this Wednesday in wound care at 2:30 p m  Diabetes 2 with neuropathy  Diabetes 2 with Charcot changes of the midfoot                  Subjective/Objective   Chief Complaint:   Chief Complaint   Patient presents with    Follow-Up Cellulitis     Patient states she was seen here Thursday for cellulitis on her R foot  Patient states that her foot is getting worse  Patient has a blister and an open area on the bottom of her foot  R foot swollena nd R and open area on the bottom of her foot       Subjective:  Relates no pain or discomfort from her foot  Ready to go home soon as she can  Blood pressure 119/56, pulse 62, temperature 98 3 °F (36 8 °C), temperature source Oral, resp  rate 18, height 5' 5" (1 651 m), weight 98 6 kg (217 lb 6 oz), SpO2 96 %  ,Body mass index is 36 17 kg/m²  Invasive Devices     Peripheral Intravenous Line            Peripheral IV 07/06/18 Right Wrist 3 days                Physical Exam:   General appearance: alert, cooperative and no distress  Neuro/Vascular: Right: DP & PT Palpable, Left: DP & PT Palpable,   Capillary Filling:  Immediate, Edema:  +1 right foot, and +0 left foot  Extremity: Sx wound right foot submetatarsal #1 clinically unchanged from yesterday measuring 4 0 x 1 5 x 3 5 cm  Wound margins are healthy, red granular base, minimal bleeding with good capillary fill    No active necrosis noted, cellulitis has resolved, minimal edema and no calor noted  Labs and other studies:   CBC w/diff    Results from last 7 days  Lab Units 07/07/18  0437   WBC Thousand/uL 4 59   HEMOGLOBIN g/dL 12 2   HEMATOCRIT % 36 7   PLATELETS Thousands/uL 333     BMP    Results from last 7 days  Lab Units 07/07/18  0437   SODIUM mmol/L 137   POTASSIUM mmol/L 4 6   CHLORIDE mmol/L 102   CO2 mmol/L 29   BUN mg/dL 13   CREATININE mg/dL 0 83   GLUCOSE RANDOM mg/dL 93   CALCIUM mg/dL 8 6     CMP    Results from last 7 days  Lab Units 07/07/18  0437   SODIUM mmol/L 137   POTASSIUM mmol/L 4 6   CHLORIDE mmol/L 102   CO2 mmol/L 29   BUN mg/dL 13   CREATININE mg/dL 0 83   CALCIUM mg/dL 8 6   TOTAL PROTEIN g/dL 6 2*   BILIRUBIN TOTAL mg/dL 0 20   ALK PHOS U/L 99   ALT U/L 30   AST U/L 27   GLUCOSE RANDOM mg/dL 93       @Culture@  Lab Results   Component Value Date    BLOODCX No Growth After 5 Days  07/02/2018    BLOODCX No Growth After 5 Days   07/02/2018    URINECX 70,000-79,000 cfu/ml Mixed Contaminants X6 08/10/2016         Current Facility-Administered Medications:     acetaminophen (TYLENOL) tablet 650 mg, 650 mg, Oral, Q6H PRN, Christina Brooke MD    aluminum-magnesium hydroxide-simethicone (MYLANTA) 200-200-20 mg/5 mL oral suspension 30 mL, 30 mL, Oral, Q4H PRN, Eveline Merino DO    atenolol (TENORMIN) tablet 50 mg, 50 mg, Oral, Daily, Christina Brooke MD, 50 mg at 07/09/18 0831    bacitracin 100,000 Units, neomycin-polymyxin B (NEOSPORIN-) 1 mL in sodium chloride 0 9 % 1,000 mL irrigation bag, , Irrigation, Once, Teresa Chapman DPM    buPROPion (WELLBUTRIN XL) 24 hr tablet 300 mg, 300 mg, Oral, Daily, Christina Brooke MD, 300 mg at 07/09/18 0831    carBAMazepine (TEGretol) tablet 600 mg, 600 mg, Oral, Daily With Tamiko Merino DO, 600 mg at 07/08/18 2029    cephalexin (KEFLEX) capsule 500 mg, 500 mg, Oral, Q6H [address], Christina Brooke MD, 500 mg at 07/09/18 1149    heparin (porcine) subcutaneous injection 5,000 Units, 5,000 Units, Subcutaneous, Q8H [facility], 5,000 Units at 07/09/18 0516 **AND** [CANCELED] Platelet count, , , Once, Neto Rodriguez MD    insulin lispro (HumaLOG) 100 units/mL subcutaneous injection 1-5 Units, 1-5 Units, Subcutaneous, TID AC, 1 Units at 07/02/18 1400 **AND** Fingerstick Glucose (POCT), , , TID AC, Neto Rodriguez MD    nicotine (NICODERM CQ) 7 mg/24hr TD 24 hr patch 1 patch, 1 patch, Transdermal, Daily, Neto Rodriguez MD, Stopped at 07/08/18 0900    ondansetron (ZOFRAN) injection 4 mg, 4 mg, Intravenous, Q6H PRN, Neto Rodriguez MD, 4 mg at 07/06/18 1532    pantoprazole (PROTONIX) EC tablet 40 mg, 40 mg, Oral, Early Morning, Eveline Fly, DO, 40 mg at 07/09/18 0516    pravastatin (PRAVACHOL) tablet 20 mg, 20 mg, Oral, Daily With Patricio Luna MD, 20 mg at 07/08/18 1708    risperiDONE (RisperDAL) tablet 1 mg, 1 mg, Oral, HS, Eveline Fly, DO, 1 mg at 07/08/18 2029    sertraline (ZOLOFT) tablet 100 mg, 100 mg, Oral, Daily, Neto Rodriguez MD, 100 mg at 07/09/18 0831    ziprasidone (GEODON) capsule 60 mg, 60 mg, Oral, HS, Eveline Fly, DO, 60 mg at 07/08/18 2029    Imaging: I have personally reviewed pertinent films in PACS  EKG, Pathology, and Other Studies: I have personally reviewed pertinent reports    VTE Pharmacologic Prophylaxis: Heparin  VTE Mechanical Prophylaxis: sequential compression device    Madelyn Bar DPM

## 2018-07-09 NOTE — PHYSICAL THERAPY NOTE
PT order received; chart reviewed; pt is observed performing ad fidelia amb in the room, (I) while using a knee scooter for NWB on (R) LE; no overt uncorrected LOB, gross knee buckling, or excessive swaying observed during amb and transfers in the room; spoke to the pt who reports she has been mobilizing on her own (I) w/ use of her knee scooter w/ no concerns about her mobility status expressed at this time; pt also does not anticipate difficulty on one step at home stating she knows how to negotiate it as she did it before; overall, pt does not feel she needs to be seen by PT for any mobility skills at this time and expresses no concerns about going home when medically cleared (states today); based on above, will D/C PT services at this time; pt informed/concurred;     Rex Kirkland, PT

## 2018-07-09 NOTE — PROGRESS NOTES
Progress Note - Infectious Disease   Luis Goodman 62 y o  female MRN: [id number]  Unit/Bed#: [address] [place] [address] Encounter: [id number]    Assessment:  62 y  o  year old female with PMH of DM with peripheral neuropathy, asthma, HLD, HTN who presents with R foot erythema, and swelling which started one week ago      1  Sepsis due to RLE cellulitis/abscess - in the setting of R charcot foot and peripheral neuropathy  Xray R foot showed worsening soft tissue swelling in the midfoot and forefoot   No soft tissue gas to suggest an abscess   No focal bony resorption identified to confirm osteomyelitis  CT showed plantar fluid collection at the medial aspect of the forefoot at the level of the 1st tarsometatarsal, measuring about 2 8 x 1 4 cm  Additional area of plantar ulceration with the surrounding phlegmonous changes in the 1st submetatarsal region  No evidence of osteomyelitis  Blood cx are showing no growth to date and superficial wound cx is growing MSSA  S/p I&D of R foot on   OR cx is growing CoNS     2  Fever - resolved     Plan:  1  Continue cefazolin while waiting for susceptilbilities of CoNS in OR cx  Subjective/Objective   Chief Complaint: RLE abscess    Subjective: feels well and denies any complaints  Objective:     HR:  [53-62] 62  Resp:  [16-18] 18  BP: (114-133)/(56-78) 119/56  SpO2:  [95 %-96 %] 96 %  Temp (24hrs), Av °F (36 7 °C), Min:97 7 °F (36 5 °C), Max:98 3 °F (36 8 °C)  Current: Temperature: 98 3 °F (36 8 °C)    Physical Exam:   Constitutional: She appears well-developed and well-nourished  Cardiovascular: Normal rate and regular rhythm     Pulmonary/Chest: Effort normal and breath sounds normal  She has no wheezes  Abdominal: Soft  Bowel sounds are normal  There is no tenderness  Musculoskeletal: She exhibits edema  R foot dressing intact  Skin: Skin is warm   There is erythema    Invasive Devices     Peripheral Intravenous Line            Peripheral IV 18 Right Wrist 3 days                Lab, Imaging and other studies: I have personally reviewed pertinent reports

## 2018-07-10 LAB — BACTERIA SPEC ANAEROBE CULT: NO GROWTH

## 2018-07-11 ENCOUNTER — TRANSITIONAL CARE MANAGEMENT (OUTPATIENT)
Dept: FAMILY MEDICINE CLINIC | Facility: CLINIC | Age: 58
End: 2018-07-11

## 2018-07-11 ENCOUNTER — APPOINTMENT (OUTPATIENT)
Dept: WOUND CARE | Facility: HOSPITAL | Age: 58
End: 2018-07-11
Attending: PODIATRIST
Payer: MEDICARE

## 2018-07-11 PROCEDURE — 97605 NEG PRS WND THER DME<=50SQCM: CPT | Performed by: PODIATRIST

## 2018-07-11 PROCEDURE — 99212 OFFICE O/P EST SF 10 MIN: CPT | Performed by: PODIATRIST

## 2018-07-11 PROCEDURE — 11042 DBRDMT SUBQ TIS 1ST 20SQCM/<: CPT | Performed by: PODIATRIST

## 2018-07-17 ENCOUNTER — OFFICE VISIT (OUTPATIENT)
Dept: BEHAVIORAL/MENTAL HEALTH CLINIC | Facility: CLINIC | Age: 58
End: 2018-07-17
Payer: MEDICARE

## 2018-07-17 DIAGNOSIS — F31.63 BIPOLAR I DISORDER, MOST RECENT EPISODE MIXED, SEVERE WITHOUT PSYCHOTIC FEATURES (HCC): Primary | ICD-10-CM

## 2018-07-17 PROCEDURE — 90834 PSYTX W PT 45 MINUTES: CPT | Performed by: SOCIAL WORKER

## 2018-07-17 NOTE — PSYCH
Psychotherapy Provided: Individual Psychotherapy 45 minutes     Length of time in session: 45 minutes, follow up in 2 week    Goals addressed in session: Goal 1, Goal 2 and Goal 3      Pain:      moderate to severe    5    Current suicide risk : Low     DChan Kellogg stated that things have been very stressful  She shared that she had gotten an infection in her foot and that an abscess developed causing her to need surgery  She shared that she had been hospitalized for a week  She also shared that her father had fallen ill and was hospitalized for a month  She is currently on a wound vac and utilizing a roll about  She stated that it has been difficult watching her grandchildren due to her movement restrictions  Processing her emotions and discussing ways for her to cope with the stress of her current situation  Reviewing and revising her treatment plan  Giving supportive therapy  A- Progress - Continuing to process her emotions  P-Continue treatment    [address]: Diagnosis and Treatment Plan explained to Roberto Pearl relates understanding diagnosis and is agreeable to Treatment Plan   Yes

## 2018-07-17 NOTE — PSYCH
Mary Bocanegra  1960       Date of Initial Treatment Plan: 6/11/09   Date of Current Treatment Plan: 07/17/18    Strengths/Personal Resources for Self Care: Good mom and grandmother, good at crafts, photography, good with pets         Diagnosis:   No diagnosis found  Area of Needs:   [facility]  Health  Self esteem  Long Term Goal 1: Be able to stay calm     Target Date: 10/17/18  Completion Date: TBD         Short Term Objectives for Goal 1:   Continue with medications  Set boundaries with Ovi Bloomfield and other hobbies    Long Term Goal 2: Be able to manage my pain and function     Target Date: 10/17/18  Completion Date: TBD    Short Term Objectives for Goal 2:   Relax when I need to  Try to reduce stress  Follow up with doctors  Take care of my feet       Long Term Goal # 3:   Be more sure of myself       Target Date: 10/17/18  Completion Date: TBD    Short Term Objectives for Goal 3:   See what I do well  Be able to take compliments/positives  Think positive  GOAL 1: Modality: Individual 1-2x per month   Completion Date TBD and The person(s) responsible for carrying out the plan is  Bess    GOAL 2: Modality: Individual 1-2x per month   Completion Date TBD and The person(s) responsible for carrying out the plan is  Bess     GOAL 3: Modality: Individual 1-2x per month   Completion Date TBD and The person(s) responsible for carrying out the plan is  [facility]: Diagnosis and Treatment Plan explained to Kana Schafer relates understanding diagnosis and is agreeable to Treatment Plan         Client Comments : Please share your thoughts, feelings, need and/or experiences regarding your treatment plan: __________________________________________________________________    __________________________________________________________________    __________________________________________________________________    __________________________________________________________________    _______________________________________                Patient signature, Date Time: __________________________________________             Physician cosigner signature, Date, Time: ________________________________

## 2018-07-19 ENCOUNTER — APPOINTMENT (OUTPATIENT)
Dept: WOUND CARE | Facility: HOSPITAL | Age: 58
End: 2018-07-19
Attending: PODIATRIST
Payer: MEDICARE

## 2018-07-19 PROCEDURE — 11042 DBRDMT SUBQ TIS 1ST 20SQCM/<: CPT | Performed by: PODIATRIST

## 2018-07-19 PROCEDURE — 97605 NEG PRS WND THER DME<=50SQCM: CPT | Performed by: PODIATRIST

## 2018-07-24 ENCOUNTER — APPOINTMENT (OUTPATIENT)
Dept: WOUND CARE | Facility: HOSPITAL | Age: 58
End: 2018-07-24
Attending: PODIATRIST
Payer: MEDICARE

## 2018-07-24 PROCEDURE — 97605 NEG PRS WND THER DME<=50SQCM: CPT | Performed by: PODIATRIST

## 2018-07-24 PROCEDURE — 11042 DBRDMT SUBQ TIS 1ST 20SQCM/<: CPT | Performed by: PODIATRIST

## 2018-07-31 ENCOUNTER — APPOINTMENT (OUTPATIENT)
Dept: WOUND CARE | Facility: HOSPITAL | Age: 58
End: 2018-07-31
Attending: PODIATRIST
Payer: MEDICARE

## 2018-07-31 PROCEDURE — 99212 OFFICE O/P EST SF 10 MIN: CPT | Performed by: PODIATRIST

## 2018-08-01 ENCOUNTER — APPOINTMENT (OUTPATIENT)
Dept: RADIOLOGY | Facility: CLINIC | Age: 58
End: 2018-08-01
Payer: MEDICARE

## 2018-08-01 ENCOUNTER — TRANSCRIBE ORDERS (OUTPATIENT)
Dept: ADMINISTRATIVE | Facility: HOSPITAL | Age: 58
End: 2018-08-01

## 2018-08-01 ENCOUNTER — OFFICE VISIT (OUTPATIENT)
Dept: FAMILY MEDICINE CLINIC | Facility: CLINIC | Age: 58
End: 2018-08-01
Payer: MEDICARE

## 2018-08-01 VITALS
WEIGHT: 217 LBS | RESPIRATION RATE: 14 BRPM | HEIGHT: 65 IN | BODY MASS INDEX: 36.15 KG/M2 | DIASTOLIC BLOOD PRESSURE: 80 MMHG | TEMPERATURE: 100.8 F | OXYGEN SATURATION: 96 % | SYSTOLIC BLOOD PRESSURE: 122 MMHG | HEART RATE: 92 BPM

## 2018-08-01 DIAGNOSIS — I10 ESSENTIAL HYPERTENSION: ICD-10-CM

## 2018-08-01 DIAGNOSIS — L08.9 FOOT INFECTION: Primary | ICD-10-CM

## 2018-08-01 DIAGNOSIS — L97.512 RIGHT FOOT ULCER, WITH FAT LAYER EXPOSED (HCC): Primary | ICD-10-CM

## 2018-08-01 DIAGNOSIS — L97.512 RIGHT FOOT ULCER, WITH FAT LAYER EXPOSED (HCC): ICD-10-CM

## 2018-08-01 DIAGNOSIS — E78.5 HYPERLIPIDEMIA, UNSPECIFIED HYPERLIPIDEMIA TYPE: ICD-10-CM

## 2018-08-01 PROCEDURE — 99214 OFFICE O/P EST MOD 30 MIN: CPT | Performed by: FAMILY MEDICINE

## 2018-08-01 PROCEDURE — 73630 X-RAY EXAM OF FOOT: CPT

## 2018-08-01 RX ORDER — CEPHALEXIN 500 MG/1
500 CAPSULE ORAL EVERY 6 HOURS SCHEDULED
Qty: 28 CAPSULE | Refills: 0 | Status: SHIPPED | OUTPATIENT
Start: 2018-08-01 | End: 2018-08-08

## 2018-08-01 RX ORDER — ATENOLOL 50 MG/1
50 TABLET ORAL DAILY
Qty: 90 TABLET | Refills: 1 | Status: SHIPPED | OUTPATIENT
Start: 2018-08-01 | End: 2019-02-15 | Stop reason: SDUPTHER

## 2018-08-01 RX ORDER — SIMVASTATIN 20 MG
20 TABLET ORAL DAILY
Qty: 90 TABLET | Refills: 1 | Status: SHIPPED | OUTPATIENT
Start: 2018-08-01 | End: 2019-02-15 | Stop reason: SDUPTHER

## 2018-08-01 NOTE — PROGRESS NOTES
Assessment/Plan:     Diagnoses and all orders for this visit:    Foot infection  -     cephalexin (KEFLEX) 500 mg capsule; Take 1 capsule (500 mg total) by mouth every 6 (six) hours for 7 days    Essential hypertension  -     atenolol (TENORMIN) 50 mg tablet; Take 1 tablet (50 mg total) by mouth daily    Hyperlipidemia, unspecified hyperlipidemia type  -     simvastatin (ZOCOR) 20 mg tablet; Take 1 tablet (20 mg total) by mouth daily      upon eval-- patient was found to have a fever  I am concerned about the possibility of osteomyelitis  She has a xray ordered by podiatry that I would like her to get now  And we will start keflex  Other meds were refilled   And she can f/u as needed or in 6 months        Subjective:     Chief Complaint   Patient presents with    Follow-up     6 month follow up for hyperlipidemia, diabetes, HTN, anxiety  Patient ID: Bryon Erazo is a 62 y o  female  Here for 6 month checkup  No acute complaints   but reviewed hospital records over cellulitis  Needs refills of meds        The following portions of the patient's history were reviewed and updated as appropriate: allergies, current medications, past family history, past medical history, past social history, past surgical history and problem list     Review of Systems   Constitutional: Negative  HENT: Negative  Eyes: Negative  Respiratory: Negative  Cardiovascular: Negative  Gastrointestinal: Negative  Endocrine: Negative  Genitourinary: Negative  Musculoskeletal: Negative  Skin: Negative  Allergic/Immunologic: Negative  Neurological: Negative  Hematological: Negative  Psychiatric/Behavioral: Negative  All other systems reviewed and are negative          Objective:    Vitals:    08/01/18 1014   BP: 122/80   BP Location: Right arm   Patient Position: Sitting   Cuff Size: Large   Pulse: 92   Resp: 14   Temp: (!) 100 8 °F (38 2 °C)   TempSrc: Tympanic   SpO2: 96%   Weight: 98 4 kg (217 lb)   Height: 5' 5" (1 651 m)          Physical Exam   Constitutional: She is oriented to person, place, and time  She appears well-developed and well-nourished  HENT:   Head: Normocephalic and atraumatic  Right Ear: External ear normal    Left Ear: External ear normal    Mouth/Throat: Oropharynx is clear and moist    Eyes: Conjunctivae and EOM are normal  Pupils are equal, round, and reactive to light  Neck: Normal range of motion  Cardiovascular: Normal rate, regular rhythm and normal heart sounds  Pulmonary/Chest: Effort normal and breath sounds normal    Abdominal: Soft  Bowel sounds are normal    Musculoskeletal: Normal range of motion  Neurological: She is alert and oriented to person, place, and time  She has normal reflexes  Skin: Skin is warm and dry  R foot is wrapped   Psychiatric: She has a normal mood and affect  Her behavior is normal  Judgment and thought content normal    Nursing note and vitals reviewed

## 2018-08-21 ENCOUNTER — APPOINTMENT (OUTPATIENT)
Dept: WOUND CARE | Facility: HOSPITAL | Age: 58
End: 2018-08-21
Attending: PODIATRIST
Payer: MEDICARE

## 2018-08-21 PROCEDURE — 99212 OFFICE O/P EST SF 10 MIN: CPT | Performed by: PODIATRIST

## 2018-08-27 ENCOUNTER — OFFICE VISIT (OUTPATIENT)
Dept: BEHAVIORAL/MENTAL HEALTH CLINIC | Facility: CLINIC | Age: 58
End: 2018-08-27
Payer: MEDICARE

## 2018-08-27 DIAGNOSIS — F31.63 BIPOLAR I DISORDER, MOST RECENT EPISODE MIXED, SEVERE WITHOUT PSYCHOTIC FEATURES (HCC): Primary | ICD-10-CM

## 2018-08-27 PROCEDURE — 90834 PSYTX W PT 45 MINUTES: CPT | Performed by: SOCIAL WORKER

## 2018-08-27 NOTE — PSYCH
Psychotherapy Provided: Individual Psychotherapy 45 minutes     Length of time in session: 45 minutes, follow up in 2 week    Goals addressed in session: Goal 2     Pain:      none    0    Current suicide risk : Low     D- Bess stated that she had just recently been able to stop using her roll about for her foot  She stated that it has finally healed  She shared that it had been very difficult because her  helped very little with the grandchildren despite the fact that her orders were to rest  Processing her emotions and discussing ways for her to communicate her frustration to her   Also continuing to work on practicing self care  Giving supportive therapy  A- Progress - Continuing to process her emotions  P-Continue treatment    [address]: Diagnosis and Treatment Plan explained to Melanie Frazier relates understanding diagnosis and is agreeable to Treatment Plan   Yes

## 2018-08-27 NOTE — PSYCH
Treatment Plan Tracking    Treatment Plan not completed within required time limits due to: PT unable to sign treatment plan due to printer issues  Will sign at the next session

## 2018-09-10 ENCOUNTER — OFFICE VISIT (OUTPATIENT)
Dept: BEHAVIORAL/MENTAL HEALTH CLINIC | Facility: CLINIC | Age: 58
End: 2018-09-10
Payer: MEDICARE

## 2018-09-10 DIAGNOSIS — F31.63 BIPOLAR I DISORDER, MOST RECENT EPISODE MIXED, SEVERE WITHOUT PSYCHOTIC FEATURES (HCC): Primary | ICD-10-CM

## 2018-09-10 PROCEDURE — 90834 PSYTX W PT 45 MINUTES: CPT | Performed by: SOCIAL WORKER

## 2018-09-10 NOTE — PSYCH
Psychotherapy Provided: Individual Psychotherapy 45 minutes     Length of time in session: 45 minutes, follow up in 2 week    Goals addressed in session: Goal 1 and Goal 2     Pain:      moderate to severe    4    Current suicide risk : Low     DChan Kellogg stated that her foot has finally healed but she has now contracted an infection in her mouth and needs to have a tooth pulled  She also stated that they have been house hunting with her daughter and son in law because they plan on purchasing a home together  She is also frustrated with her  because of his lack of participation in helping with the grandchildren  Processing her emotions and discussing ways for her to cope with the stress in her life  Also continuing to discuss ways for her to set boundaries with her  and assert herself in the relationship  Reviewing and signing her treatment plan  Giving supportive therapy  A- Progress - Continuing to process her emotions  P-Continue treatment  Behavioral Health Treatment Plan [facility]: Diagnosis and Treatment Plan explained to Stefania Angela relates understanding diagnosis and is agreeable to Treatment Plan   Yes

## 2018-09-24 ENCOUNTER — OFFICE VISIT (OUTPATIENT)
Dept: BEHAVIORAL/MENTAL HEALTH CLINIC | Facility: CLINIC | Age: 58
End: 2018-09-24
Payer: MEDICARE

## 2018-09-24 DIAGNOSIS — F31.63 BIPOLAR I DISORDER, MOST RECENT EPISODE MIXED, SEVERE WITHOUT PSYCHOTIC FEATURES (HCC): Primary | ICD-10-CM

## 2018-09-24 PROCEDURE — 90834 PSYTX W PT 45 MINUTES: CPT | Performed by: SOCIAL WORKER

## 2018-09-24 NOTE — PSYCH
Psychotherapy Provided: Individual Psychotherapy 45 minutes     Length of time in session: 45 minutes, follow up in 2 week    Goals addressed in session: Goal 1     Pain:      none    0    Current suicide risk : Low     D- Bess stated that they continue to work on their home to ready it for sale  She stated that they are excited about the prospect of moving  She stated that she continues to worry about their ferrets as she feels that their health is failing due to advanced age  She also stated  That her grandson's behavior has been stressful  Processing her emotions and discussing ways for her to cope with the stressing her life  Continuing to work on ways to practice self care  Giving supportive therapy  A- progress - Continuing to process her emotions  P- Continue treatment    Behavioral Health Treatment Plan [facility]: Diagnosis and Treatment Plan explained to Crystal Llamas relates understanding diagnosis and is agreeable to Treatment Plan   Yes

## 2018-10-15 ENCOUNTER — OFFICE VISIT (OUTPATIENT)
Dept: BEHAVIORAL/MENTAL HEALTH CLINIC | Facility: CLINIC | Age: 58
End: 2018-10-15
Payer: MEDICARE

## 2018-10-15 DIAGNOSIS — F31.63 BIPOLAR I DISORDER, MOST RECENT EPISODE MIXED, SEVERE WITHOUT PSYCHOTIC FEATURES (HCC): Primary | ICD-10-CM

## 2018-10-15 PROCEDURE — 90834 PSYTX W PT 45 MINUTES: CPT | Performed by: SOCIAL WORKER

## 2018-10-15 NOTE — PSYCH
Psychotherapy Provided: Individual Psychotherapy 45 minutes     Length of time in session: 45 minutes, follow up in 2 week    Goals addressed in session: Goal 1     Pain:      moderate to severe    4    Current suicide risk : Low     DChan Kellogg stated that things have been very difficult recently  She stated that her father had a heart attack and also that two of her ferrets passed away over the weekend  Processing her emotions and discussing ways for her to be able to cope with the recent stressors in her life  She also discussed concern over her grandson whom she feels is being "pushed aside" by his step mother  Discussing ways for her to be supportive of him  Giving supportive therapy  A- Progress - Continuing to process her emotions  P-Continue treatment    [address]: Diagnosis and Treatment Plan explained to Angela Rowe relates understanding diagnosis and is agreeable to Treatment Plan   Yes

## 2018-10-19 ENCOUNTER — OFFICE VISIT (OUTPATIENT)
Dept: FAMILY MEDICINE CLINIC | Facility: CLINIC | Age: 58
End: 2018-10-19
Payer: MEDICARE

## 2018-10-19 VITALS
WEIGHT: 229 LBS | RESPIRATION RATE: 14 BRPM | OXYGEN SATURATION: 98 % | BODY MASS INDEX: 38.15 KG/M2 | HEIGHT: 65 IN | HEART RATE: 76 BPM | TEMPERATURE: 99 F | DIASTOLIC BLOOD PRESSURE: 88 MMHG | SYSTOLIC BLOOD PRESSURE: 130 MMHG

## 2018-10-19 DIAGNOSIS — H61.23 BILATERAL IMPACTED CERUMEN: Primary | ICD-10-CM

## 2018-10-19 DIAGNOSIS — H60.533 ACUTE CONTACT OTITIS EXTERNA OF BOTH EARS: ICD-10-CM

## 2018-10-19 PROCEDURE — 99213 OFFICE O/P EST LOW 20 MIN: CPT | Performed by: NURSE PRACTITIONER

## 2018-10-19 PROCEDURE — 69209 REMOVE IMPACTED EAR WAX UNI: CPT | Performed by: NURSE PRACTITIONER

## 2018-10-19 RX ORDER — NEOMYCIN SULFATE, POLYMYXIN B SULFATE AND HYDROCORTISONE 10; 3.5; 1 MG/ML; MG/ML; [USP'U]/ML
3 SUSPENSION/ DROPS AURICULAR (OTIC) 2 TIMES DAILY
Qty: 1.2 ML | Refills: 0 | Status: SHIPPED | OUTPATIENT
Start: 2018-10-19 | End: 2019-06-03 | Stop reason: ALTCHOICE

## 2018-10-19 NOTE — PROGRESS NOTES
Assessment/Plan   Diagnoses and all orders for this visit:    Bilateral impacted cerumen  Comments:  removal of cerumen on bilateral ear canals, without difficulty via irrigation    Acute contact otitis externa of both ears  -     neomycin-polymyxin-hydrocortisone (CORTISPORIN) 0 35%-10,000 units/mL-1% otic suspension; Administer 3 drops into both ears 2 (two) times a day for 4 days        Chief Complaint   Patient presents with    Ear Fullness     hearing loss and fullness    Earache     ear pain, left side        Subjective   Patient ID: Chary Iqbal is a 62 y o  female  Vitals:    10/19/18 1426   BP: 130/88   Pulse: 76   Resp: 14   Temp: 99 °F (37 2 °C)   SpO2: 98%     Ear Fullness    There is pain in both ears  This is a new problem  The current episode started in the past 7 days  The problem occurs constantly  The problem has been unchanged  There has been no fever  The fever has been present for less than 1 day  The pain is at a severity of 0/10  The patient is experiencing no pain  Associated symptoms comments: No associated symptoms  Treatments tried: peroxide in ears  The treatment provided no relief  The following portions of the patient's history were reviewed and updated as appropriate: allergies, current medications, past family history, past social history, past surgical history and problem list     Review of Systems   Constitutional: Negative  Negative for fatigue and fever  HENT: Negative  Eyes: Negative  Respiratory: Negative  Cardiovascular: Negative  Gastrointestinal: Negative  Endocrine: Negative  Genitourinary: Negative  Musculoskeletal: Negative  Skin: Negative  Allergic/Immunologic: Negative  Neurological: Negative  Hematological: Negative  Psychiatric/Behavioral: Negative  Objective     Physical Exam   Constitutional: She is oriented to person, place, and time  She appears well-developed and well-nourished  No distress     HENT: Head: Normocephalic and atraumatic  Nose: Nose normal    Mouth/Throat: Oropharynx is clear and moist  No oropharyngeal exudate  Post cerumen removal ear canal red and irritated    Eyes: Pupils are equal, round, and reactive to light  Conjunctivae and EOM are normal  Right eye exhibits no discharge  Left eye exhibits no discharge  Neck: Normal range of motion  Neck supple  No thyromegaly present  Cardiovascular: Normal rate, regular rhythm, normal heart sounds and intact distal pulses  No murmur heard  Pulmonary/Chest: Effort normal and breath sounds normal    Abdominal: Soft  Musculoskeletal: Normal range of motion  She exhibits no edema or tenderness  Lymphadenopathy:     She has no cervical adenopathy  Neurological: She is alert and oriented to person, place, and time  Skin: Skin is warm and dry  Capillary refill takes less than 2 seconds  She is not diaphoretic  Psychiatric: She has a normal mood and affect  Her behavior is normal  Judgment and thought content normal    Nursing note and vitals reviewed    No Known Allergies   Patient Active Problem List   Diagnosis    Anxiety disorder    Bipolar I disorder, most recent episode mixed, severe without psychotic features ([facility])    BPPV (benign paroxysmal positional vertigo)    Charcot's joint of foot    Chronic constipation    Chronic headaches    Chronic migraine without aura    Essential hypertension    Hyperlipidemia    Insomnia    Leukopenia    Medication overuse headache    Occipital neuralgia    Pain syndrome, chronic    Type 2 diabetes mellitus with diabetic neuropathy, without long-term current use of insulin ([facility])       Current Outpatient Prescriptions:     atenolol (TENORMIN) 50 mg tablet, Take 1 tablet (50 mg total) by mouth daily, Disp: 90 tablet, Rfl: 1    buPROPion (WELLBUTRIN XL) 300 mg 24 hr tablet, Take 1 tablet by mouth daily, Disp: , Rfl:     carBAMazepine (EPITOL) 200 mg tablet, Take 3 tablets by mouth, Disp: , Rfl:     Omega-3 Fatty Acids (FISH OIL) 1,000 mg, Take 1 capsule by mouth 2 (two) times a day, Disp: , Rfl:     risperiDONE (RisperDAL) 1 mg tablet, Take 1 mg by mouth daily  , Disp: , Rfl:     sertraline (ZOLOFT) 100 mg tablet, Take 1 tablet by mouth daily, Disp: , Rfl:     simvastatin (ZOCOR) 20 mg tablet, Take 1 tablet (20 mg total) by mouth daily, Disp: 90 tablet, Rfl: 1    ziprasidone (GEODON) 60 mg capsule, Two tablets once daily , Disp: , Rfl:     neomycin-polymyxin-hydrocortisone (CORTISPORIN) 0 35%-10,000 units/mL-1% otic suspension, Administer 3 drops into both ears 2 (two) times a day for 4 days, Disp: 1 2 mL, Rfl: 0  Social History     Social History    Marital status: /Civil Union     Spouse name: N/A    Number of children: N/A    Years of education: N/A     Occupational History    Not on file       Social History Main Topics    Smoking status: Former Smoker     Quit date: 7/1/2018    Smokeless tobacco: Never Used    Alcohol use No      Comment: (history)    Drug use: No    Sexual activity: Not on file     Other Topics Concern    Not on file     Social History Narrative    Caffeine use- coffee, one cup per day    Lives with spouse         Family History   Problem Relation Age of Onset    Heart disease Father     Prostate cancer Father     Heart attack Maternal Grandmother     Colon cancer Maternal Grandfather     Breast cancer Paternal Grandmother     Heart attack Paternal Grandfather     Coronary artery disease Family     Diabetes Family     No Known Problems Mother

## 2018-10-19 NOTE — PROGRESS NOTES
Ear cerumen removal  Date/Time: 10/19/2018 2:54 PM  Performed by: Nolan Velasquez  Authorized by: Nolan Velasquez     Patient location:  Clinic  Indications / Diagnosis:  Cerumen impaction  Other Assisting Provider: No    Consent:     Consent obtained:  Verbal    Consent given by:  Patient    Risks discussed:  Bleeding, infection, pain, TM perforation, incomplete removal and dizziness    Alternatives discussed:  No treatment and delayed treatment  Universal protocol:     Procedure explained and questions answered to patient or proxy's satisfaction: yes      Patient identity confirmed:  Verbally with patient  Procedure details:     Location:  L ear, R ear and external auditory canal    Procedure type: irrigation      Approach:  Natural orifice    Equipment used:  Irrigation syringe and warm water  Post-procedure details:     Complication:  None    Hearing quality:  Normal    Patient tolerance of procedure:   Tolerated well, no immediate complications

## 2018-10-19 NOTE — PATIENT INSTRUCTIONS
Otitis Externa   WHAT YOU NEED TO KNOW:   Otitis externa, or swimmer's ear, is an infection in the outer ear canal  This canal goes from the outside of the ear to the eardrum  DISCHARGE INSTRUCTIONS:   Return to the emergency department if:   · You have severe ear pain  · You are suddenly unable to hear at all  · You have new swelling in your face, behind your ears, or in your neck  · You suddenly cannot move part of your face  · Your face suddenly feels numb  Contact your healthcare provider if:   · You have a fever  · Your signs and symptoms do not get better after 2 days of treatment  · Your signs and symptoms go away for a time, but then come back  · You have questions or concerns about your condition or care  Medicines:   · NSAIDs , such as ibuprofen, help decrease swelling, pain, and fever  This medicine is available with or without a doctor's order  NSAIDs can cause stomach bleeding or kidney problems in certain people  If you take blood thinner medicine, always ask if NSAIDs are safe for you  Always read the medicine label and follow directions  Do not give these medicines to children under 10months of age without direction from your child's healthcare provider  · Acetaminophen  decreases pain and fever  It is available without a doctor's order  Ask how much to take and how often to take it  Follow directions  Acetaminophen can cause liver damage if not taken correctly  · Ear drops  that contain an antibiotic may be given  The antibiotic helps treat a bacterial infection  You may also be given steroid medicine  The steroid helps decrease redness, swelling, and pain  · Take your medicine as directed  Contact your healthcare provider if you think your medicine is not helping or if you have side effects  Tell him or her if you are allergic to any medicine  Keep a list of the medicines, vitamins, and herbs you take  Include the amounts, and when and why you take them  Bring the list or the pill bottles to follow-up visits  Carry your medicine list with you in case of an emergency  Follow up with your healthcare provider as directed:  Write down your questions so you remember to ask them during your visits  How to use eardrops:   · Lie down on your side with your infected ear facing up  · Carefully drip the correct number of eardrops into your ear  Have another person help you if possible  · Gently move the outside part of your ear back and forth to help the medicine reach your ear canal      · Stay lying down in the same position (with your ear facing up) for 3 to 5 minutes  Prevent otitis externa:   · Do not put cotton swabs or foreign objects in your ears  · Wrap a clean moist washcloth around your finger, and use it to clean your outer ear and remove extra ear wax  · Use ear plugs when you swim  Dry your outer ears completely after you swim or bathe  © 2017 [address]  Information is for End User's use only and may not be sold, redistributed or otherwise used for commercial purposes  All illustrations and images included in CareNotes® are the copyrighted property of A D A M [employer]  or Rakesh Sánchez  The above information is an  only  It is not intended as medical advice for individual conditions or treatments  Talk to your doctor, nurse or pharmacist before following any medical regimen to see if it is safe and effective for you

## 2018-10-29 ENCOUNTER — OFFICE VISIT (OUTPATIENT)
Dept: BEHAVIORAL/MENTAL HEALTH CLINIC | Facility: CLINIC | Age: 58
End: 2018-10-29
Payer: MEDICARE

## 2018-10-29 DIAGNOSIS — F31.63 BIPOLAR I DISORDER, MOST RECENT EPISODE MIXED, SEVERE WITHOUT PSYCHOTIC FEATURES (HCC): Primary | ICD-10-CM

## 2018-10-29 PROCEDURE — 90834 PSYTX W PT 45 MINUTES: CPT | Performed by: SOCIAL WORKER

## 2018-10-29 NOTE — PSYCH
Psychotherapy Provided: Individual Psychotherapy 45 minutes     Length of time in session: 45 minutes, follow up in 2 week    Goals addressed in session: Goal 1, Goal 2 and Goal 3      Pain:      none    0    Current suicide risk : Low     D- Bses stated that she has been doing fairly well  She stated that she has been very busy watching her grandchildren  Her daughter and son in law continue to try to sell their home  Continuing to work on managing the stress in her life as well as maintain healthy relationships  Reviewing and renewing her treatment plan  Giving supportive therapy  A- Progress- Continuing to process her emotions  P-Continue treatment    [address]: Diagnosis and Treatment Plan explained to Bere Dotson relates understanding diagnosis and is agreeable to Treatment Plan   Yes

## 2018-10-29 NOTE — PSYCH
Treatment Plan Tracking    Treatment Plan not completed within required time limits due to: Treatment plan created verbally  PT will sign at the next session

## 2018-10-29 NOTE — PSYCH
Dee Valentin  1960       Date of Initial Treatment Plan: 6/11/09   Date of Current Treatment Plan: 10/29/18    Strengths/Personal Resources for Self Care: Good mom and grandmother, good at crafts, photography, good with pets  Diagnosis:   No diagnosis found  Area of Needs:   [facility]  [facility]  Self esteem  Long Term Goal 1: Be able to stay calm     Target Date: 1/29/19  Completion Date: TBD         Short Term Objectives for Goal 1:   Continue with medications  Set boundaries with Raenelle Milks and other hobbies    Long Term Goal 2:  Be able to manage my pain and function     Target Date: 1/29/19  Completion Date: TBD    Short Term Objectives for Goal 2:   Relax when I need to  Try to reduce stress  Follow up with doctors  Take care of my feet         Long Term Goal # 3:   Be more sure of myself       Target Date: 1/29/19  Completion Date: TBD    Short Term Objectives for Goal 3:   See what I do well  Be able to take compliments/positives  Think positive  GOAL 1: Modality: Individual 1-2x per month   Completion Date TBD and The person(s) responsible for carrying out the plan is  Bess    GOAL 2: Modality: Individual 1-2x per month   Completion Date TBD and The person(s) responsible for carrying out the plan is  Bess     GOAL 3: Modality: Individual 1-2x per month   Completion Date TBD and The person(s) responsible for carrying out the plan is  [facility]: Diagnosis and Treatment Plan explained to Nicole Roberts relates understanding diagnosis and is agreeable to Treatment Plan         Client Comments : Please share your thoughts, feelings, need and/or experiences regarding your treatment plan: __________________________________________________________________    __________________________________________________________________    __________________________________________________________________    __________________________________________________________________    _______________________________________                Patient signature, Date Time: __________________________________________             Physician cosigner signature, Date, Time: ________________________________

## 2018-11-12 ENCOUNTER — OFFICE VISIT (OUTPATIENT)
Dept: BEHAVIORAL/MENTAL HEALTH CLINIC | Facility: CLINIC | Age: 58
End: 2018-11-12
Payer: MEDICARE

## 2018-11-12 DIAGNOSIS — F31.63 BIPOLAR I DISORDER, MOST RECENT EPISODE MIXED, SEVERE WITHOUT PSYCHOTIC FEATURES (HCC): Primary | ICD-10-CM

## 2018-11-12 PROCEDURE — 90834 PSYTX W PT 45 MINUTES: CPT | Performed by: SOCIAL WORKER

## 2018-11-12 NOTE — PSYCH
Psychotherapy Provided: Individual Psychotherapy 45 minutes     Length of time in session: 45 minutes, follow up in 2 week    Goals addressed in session: Goal 2     Pain:      none    0    Current suicide risk : Low     D- Bess stated that she has been feeling positive  She shared that her granddaughter and been helping her cook and bake which she has enjoyed  She also shared that they continue to house hunt which has been positive  Continuing to work on use of healthy coping mechanisms in her life  Reviewing and signing her treatment plan  Giving supportive therapy  A- Progress - Continuing to process her emotions  P-Continue treatment    [address]: Diagnosis and Treatment Plan explained to Angela Rowe relates understanding diagnosis and is agreeable to Treatment Plan   Yes

## 2018-12-03 ENCOUNTER — OFFICE VISIT (OUTPATIENT)
Dept: BEHAVIORAL/MENTAL HEALTH CLINIC | Facility: CLINIC | Age: 58
End: 2018-12-03
Payer: MEDICARE

## 2018-12-03 DIAGNOSIS — F31.63 BIPOLAR I DISORDER, MOST RECENT EPISODE MIXED, SEVERE WITHOUT PSYCHOTIC FEATURES (HCC): Primary | ICD-10-CM

## 2018-12-03 PROCEDURE — 90834 PSYTX W PT 45 MINUTES: CPT | Performed by: SOCIAL WORKER

## 2018-12-03 NOTE — PSYCH
Psychotherapy Provided: Individual Psychotherapy 45 minutes     Length of time in session: 45 minutes, follow up in 2 week    Goals addressed in session: Goal 1     Pain:      none    0    Current suicide risk : Low     D- Bess stated that she has been struggling with the decrease in her medication  Discussed contacting the doctor to discuss the issues she is experiencing  Also continuing to work on practicing self care  Giving supportive therapy  A- Progress - Continuing to process her emotions  P-Continue treatment    [address]: Diagnosis and Treatment Plan explained to Maisie Boast relates understanding diagnosis and is agreeable to Treatment Plan   Yes

## 2018-12-17 ENCOUNTER — OFFICE VISIT (OUTPATIENT)
Dept: BEHAVIORAL/MENTAL HEALTH CLINIC | Facility: CLINIC | Age: 58
End: 2018-12-17
Payer: MEDICARE

## 2018-12-17 DIAGNOSIS — F31.63 BIPOLAR I DISORDER, MOST RECENT EPISODE MIXED, SEVERE WITHOUT PSYCHOTIC FEATURES (HCC): Primary | ICD-10-CM

## 2018-12-17 PROCEDURE — 90834 PSYTX W PT 45 MINUTES: CPT | Performed by: SOCIAL WORKER

## 2018-12-17 NOTE — PSYCH
Psychotherapy Provided: Individual Psychotherapy 45 minutes     Length of time in session: 45 minutes, follow up in 2 week    Goals addressed in session: Goal 2     Pain:      moderate to severe    5    Current suicide risk : Low     D- Bess stated that things have been somewhat stressful due to planning and hosting recent family gatherings  She stated that things will slow down somewhat due to not having to babysit as much over [place] break  Continuing to work on utilization of coping mechanisms for stress as well as continuing to practice self care  Giving supportive therapy  A- Progress - Continuing to process her emotions  P-Continue treatment    [address]: Diagnosis and Treatment Plan explained to Patrick Quiñonez relates understanding diagnosis and is agreeable to Treatment Plan   Yes

## 2019-01-07 ENCOUNTER — OFFICE VISIT (OUTPATIENT)
Dept: BEHAVIORAL/MENTAL HEALTH CLINIC | Facility: CLINIC | Age: 59
End: 2019-01-07
Payer: MEDICARE

## 2019-01-07 DIAGNOSIS — F31.63 BIPOLAR I DISORDER, MOST RECENT EPISODE MIXED, SEVERE WITHOUT PSYCHOTIC FEATURES (HCC): Primary | ICD-10-CM

## 2019-01-07 PROCEDURE — 90834 PSYTX W PT 45 MINUTES: CPT | Performed by: SOCIAL WORKER

## 2019-01-07 NOTE — PSYCH
Psychotherapy Provided: Individual Psychotherapy 45 minutes     Length of time in session: 45 minutes, follow up in 2 week    Goals addressed in session: Goal 1 and Goal 2     Pain:      none    0    Current suicide risk : Low     D- Bess stated that her holidays went well  She stated that they are continuing to search for a home to share with her daughter and son in law  She stated that she has started taking portraits again and that she has been enjoying it  Continuing to work on increasing self care  Giving supportive therapy  A- Progress - Continuing to process her emotions  P-Continue treatment    [address]: Diagnosis and Treatment Plan explained to Toni Delcid relates understanding diagnosis and is agreeable to Treatment Plan   Yes

## 2019-02-04 ENCOUNTER — OFFICE VISIT (OUTPATIENT)
Dept: BEHAVIORAL/MENTAL HEALTH CLINIC | Facility: CLINIC | Age: 59
End: 2019-02-04
Payer: MEDICARE

## 2019-02-04 DIAGNOSIS — F31.63 BIPOLAR I DISORDER, MOST RECENT EPISODE MIXED, SEVERE WITHOUT PSYCHOTIC FEATURES (HCC): Primary | ICD-10-CM

## 2019-02-04 PROCEDURE — 90834 PSYTX W PT 45 MINUTES: CPT | Performed by: SOCIAL WORKER

## 2019-02-04 NOTE — PSYCH
Psychotherapy Provided: Individual Psychotherapy 45 minutes     Length of time in session: 45 minutes, follow up in 1 month    Goals addressed in session: Goal 1, Goal 2 and Goal 3      Pain:      moderate to severe    4    Current suicide risk : Low     LAKHWINDERChan Kellogg stated that she is concerned about bills that she has received from this office and stated that she may have to cut her sessions back due to financial reasons  Discussed contacting her insurance as well as the billing office  Also discussed their continued search for homes and repairs that they are making on their current home  Discussing continuing to utilize healthy coping mechanisms for stress  Lelo Palma stated that she continues to take photographs which has been a really positive experience for her  Reviewing and renewing her treatment plan Giving supportive therapy  Behavioral Health Treatment Plan [facility]: Diagnosis and Treatment Plan explained to Pineda Piedra relates understanding diagnosis and is agreeable to Treatment Plan   Yes

## 2019-02-05 NOTE — PSYCH
Treatment Plan Tracking    }Treatment Plan not completed within required time limits due to: Treatment paln created verbally  PT clarissa sign at her next session

## 2019-02-05 NOTE — PSYCH
Bárbara Pia  1960       Date of Initial Treatment Plan: 6/11/09   Date of Current Treatment Plan: 02/05/19      Strengths/Personal Resources for Self Care: Good mom and grandmother, good at crafts, photography, good with pets  Diagnosis:   1  Bipolar I disorder, most recent episode mixed, severe without psychotic features ([facility] [address] )         Area of Needs:   [facility]  Health  Self esteem  Long Term Goal 1: Be able to stay calm     Target Date: 5/4/19  Completion Date: TBD         Short Term Objectives for Goal 1:    Continue with medications  Set boundaries with [employer] and other hobbies    Long Term Goal 2: Be able to manage my pain and function        Target Date: 5/4/19  Completion Date: TBD    Short Term Objectives for Goal 2:   Relax when I need to  Try to reduce stress  Follow up with doctors  Take care of my feet          Long Term Goal # 3:  Be more sure of myself       Target Date: 5/4/19  Completion Date: TBD    Short Term Objectives for Goal 3:   See what I do well  Be able to take compliments/positives  Think positive  GOAL 1: Modality: Individual 1-2x per month   Completion Date TBD and The person(s) responsible for carrying out the plan is  Bess    GOAL 2: Modality: Individual 1-2x per month   Completion Date TBD and The person(s) responsible for carrying out the plan is  Bess     GOAL 3: Modality: Individual 1-2x per month   Completion Date TBD and The person(s) responsible for carrying out the plan is  [employer]: Diagnosis and Treatment Plan explained to Rylie Spear relates understanding diagnosis and is agreeable to Treatment Plan         Client Comments : Please share your thoughts, feelings, need and/or experiences regarding your treatment plan: __________________________________________________________________    __________________________________________________________________    __________________________________________________________________    __________________________________________________________________    _______________________________________                Patient signature, Date Time: __________________________________________             Physician cosigner signature, Date, Time: ________________________________

## 2019-02-15 ENCOUNTER — APPOINTMENT (OUTPATIENT)
Dept: LAB | Facility: CLINIC | Age: 59
End: 2019-02-15
Payer: MEDICARE

## 2019-02-15 ENCOUNTER — OFFICE VISIT (OUTPATIENT)
Dept: FAMILY MEDICINE CLINIC | Facility: CLINIC | Age: 59
End: 2019-02-15
Payer: MEDICARE

## 2019-02-15 VITALS
DIASTOLIC BLOOD PRESSURE: 86 MMHG | TEMPERATURE: 97.5 F | WEIGHT: 234.6 LBS | OXYGEN SATURATION: 97 % | HEART RATE: 68 BPM | BODY MASS INDEX: 39.09 KG/M2 | SYSTOLIC BLOOD PRESSURE: 132 MMHG | HEIGHT: 65 IN

## 2019-02-15 DIAGNOSIS — I10 ESSENTIAL HYPERTENSION: ICD-10-CM

## 2019-02-15 DIAGNOSIS — Z12.39 SCREENING FOR BREAST CANCER: ICD-10-CM

## 2019-02-15 DIAGNOSIS — E78.5 HYPERLIPIDEMIA, UNSPECIFIED HYPERLIPIDEMIA TYPE: ICD-10-CM

## 2019-02-15 DIAGNOSIS — E11.40 TYPE 2 DIABETES MELLITUS WITH DIABETIC NEUROPATHY, WITHOUT LONG-TERM CURRENT USE OF INSULIN (HCC): ICD-10-CM

## 2019-02-15 DIAGNOSIS — Z00.00 MEDICARE ANNUAL WELLNESS VISIT, SUBSEQUENT: Primary | ICD-10-CM

## 2019-02-15 DIAGNOSIS — R22.1 NECK MASS: ICD-10-CM

## 2019-02-15 DIAGNOSIS — K59.04 CHRONIC IDIOPATHIC CONSTIPATION: ICD-10-CM

## 2019-02-15 LAB
ANION GAP SERPL CALCULATED.3IONS-SCNC: 9 MMOL/L (ref 4–13)
BUN SERPL-MCNC: 16 MG/DL (ref 5–25)
CALCIUM SERPL-MCNC: 9.1 MG/DL (ref 8.3–10.1)
CHLORIDE SERPL-SCNC: 103 MMOL/L (ref 100–108)
CO2 SERPL-SCNC: 26 MMOL/L (ref 21–32)
CREAT SERPL-MCNC: 0.84 MG/DL (ref 0.6–1.3)
EST. AVERAGE GLUCOSE BLD GHB EST-MCNC: 177 MG/DL
GFR SERPL CREATININE-BSD FRML MDRD: 77 ML/MIN/1.73SQ M
GLUCOSE P FAST SERPL-MCNC: 149 MG/DL (ref 65–99)
HBA1C MFR BLD: 7.8 % (ref 4.2–6.3)
POTASSIUM SERPL-SCNC: 4.3 MMOL/L (ref 3.5–5.3)
SODIUM SERPL-SCNC: 138 MMOL/L (ref 136–145)

## 2019-02-15 PROCEDURE — 36415 COLL VENOUS BLD VENIPUNCTURE: CPT

## 2019-02-15 PROCEDURE — 80048 BASIC METABOLIC PNL TOTAL CA: CPT

## 2019-02-15 PROCEDURE — 83036 HEMOGLOBIN GLYCOSYLATED A1C: CPT

## 2019-02-15 PROCEDURE — 99214 OFFICE O/P EST MOD 30 MIN: CPT | Performed by: FAMILY MEDICINE

## 2019-02-15 PROCEDURE — G0439 PPPS, SUBSEQ VISIT: HCPCS | Performed by: FAMILY MEDICINE

## 2019-02-15 RX ORDER — ATENOLOL 50 MG/1
50 TABLET ORAL DAILY
Qty: 90 TABLET | Refills: 1 | Status: SHIPPED | OUTPATIENT
Start: 2019-02-15 | End: 2019-06-20 | Stop reason: ALTCHOICE

## 2019-02-15 RX ORDER — SIMVASTATIN 20 MG
20 TABLET ORAL DAILY
Qty: 90 TABLET | Refills: 1 | Status: SHIPPED | OUTPATIENT
Start: 2019-02-15 | End: 2019-07-26 | Stop reason: SDUPTHER

## 2019-02-15 NOTE — PROGRESS NOTES
Assessment/Plan:       Diagnoses and all orders for this visit:    Medicare annual wellness visit, subsequent    Screening for breast cancer  -     Mammo screening bilateral w cad; Future      medicare wellness visit completed  See other note for acute/chronic issues      Subjective:     CC: Medicare Wellness Visit       Patient ID: Catrachita Lorenzo is a 62 y o  female  Here for Medicare wellness visit  See other note for acute and chronic issues      The following portions of the patient's history were reviewed and updated as appropriate: allergies, current medications, past family history, past medical history, past social history, past surgical history and problem list     Review of Systems   Constitutional: Negative  HENT: Negative  Eyes: Negative  Respiratory: Negative  Cardiovascular: Negative  Gastrointestinal: Negative  Negative for bowel incontinence  Endocrine: Negative  Genitourinary: Negative  Musculoskeletal: Negative  Skin: Negative  Allergic/Immunologic: Negative  Neurological: Negative  Hematological: Negative  Psychiatric/Behavioral: The patient is nervous/anxious  All other systems reviewed and are negative  Objective:    Vitals:    02/15/19 0900   BP: 132/86   BP Location: Left arm   Patient Position: Sitting   Cuff Size: Large   Pulse: 68   Temp: 97 5 °F (36 4 °C)   TempSrc: Tympanic   SpO2: 97%   Weight: 106 kg (234 lb 9 6 oz)   Height: 5' 5" (1 651 m)          Physical Exam   Constitutional: She is oriented to person, place, and time  She appears well-developed and well-nourished  HENT:   Head: Normocephalic and atraumatic  Right Ear: External ear normal    Left Ear: External ear normal    Mouth/Throat: Oropharynx is clear and moist    Eyes: Pupils are equal, round, and reactive to light  Conjunctivae and EOM are normal    Neck: Normal range of motion  Cardiovascular: Normal rate, regular rhythm and normal heart sounds  Pulmonary/Chest: Effort normal and breath sounds normal    Abdominal: Soft  Bowel sounds are normal    Musculoskeletal: Normal range of motion  Neurological: She is alert and oriented to person, place, and time  She has normal reflexes  Skin: Skin is warm and dry  Psychiatric: She has a normal mood and affect  Her behavior is normal  Judgment and thought content normal    Nursing note and vitals reviewed  Assessment and Plan:    Problem List Items Addressed This Visit        Endocrine    Type 2 diabetes mellitus with diabetic neuropathy, without long-term current use of insulin ([facility] )    Relevant Orders    Basic metabolic panel    Hemoglobin A1C       Cardiovascular and Mediastinum    Essential hypertension    Relevant Medications    atenolol (TENORMIN) 50 mg tablet       Other    Hyperlipidemia    Relevant Medications    simvastatin (ZOCOR) 20 mg tablet      Other Visit Diagnoses     Medicare annual wellness visit, subsequent    -  Primary    Neck mass        Relevant Orders    CT soft tissue neck w contrast    Chronic idiopathic constipation        Relevant Medications    Linaclotide (LINZESS) 290 MCG CAPS    Screening for breast cancer        Relevant Orders    Mammo screening bilateral w cad        Health Maintenance Due   Topic Date Due    Hepatitis C Screening  1960    Medicare Annual Wellness Visit (AWV)  1960    Diabetic Foot Exam  11/25/1970    DM Eye Exam  11/25/1970    BMI: Followup Plan  11/25/1978    HEPATITIS B VACCINES (1 of 3 - Risk 3-dose series) 11/25/1979    Pneumococcal PPSV23 Medium Risk Adult (1 of 1 - PPSV23) 11/25/1979    URINE MICROALBUMIN  09/04/2015    INFLUENZA VACCINE  07/01/2018    HEMOGLOBIN A1C  01/03/2019         HPI:  Ariel Leon is a 62 y o  female here for her Subsequent Wellness Visit      Patient Active Problem List   Diagnosis    Anxiety disorder    Bipolar I disorder, most recent episode mixed, severe without psychotic features ([facility] )    BPPV (benign paroxysmal positional vertigo)    Charcot's joint of foot    Chronic constipation    Chronic headaches    Chronic migraine without aura    Essential hypertension    Hyperlipidemia    Insomnia    Leukopenia    Medication overuse headache    Occipital neuralgia    Pain syndrome, chronic    Type 2 diabetes mellitus with diabetic neuropathy, without long-term current use of insulin (HCC)     Past Medical History:   Diagnosis Date    Anxiety     Asthmatic bronchitis with exacerbation     Depression     Diabetes mellitus ([address] )     Hyperlipidemia     Hypertension     Other headache syndrome      Past Surgical History:   Procedure Laterality Date    INCISION AND DRAINAGE OF WOUND Right 2018    Procedure: INCISION AND DRAINAGE (I&D) EXTREMITY;  Surgeon: Laure Santamaria DPM;  Location: [facility];  Service: Podiatry    KNEE SURGERY      TONSILLECTOMY      US BREAST CYST ASPIRATION RIGHT INITIAL Right 2018     Family History   Problem Relation Age of Onset    Heart disease Father     Prostate cancer Father     Heart attack Maternal Grandmother     Colon cancer Maternal Grandfather     Breast cancer Paternal Grandmother     Heart attack Paternal Grandfather     Coronary artery disease Family     Diabetes Family     No Known Problems Mother      Social History     Tobacco Use   Smoking Status Former Smoker    Last attempt to quit: 2018    Years since quittin 6   Smokeless Tobacco Never Used     Social History     Substance and Sexual Activity   Alcohol Use No    Comment: (history)      Social History     Substance and Sexual Activity   Drug Use No       Current Outpatient Medications   Medication Sig Dispense Refill    atenolol (TENORMIN) 50 mg tablet Take 1 tablet (50 mg total) by mouth daily 90 tablet 1    buPROPion (WELLBUTRIN XL) 300 mg 24 hr tablet Take 1 tablet by mouth daily      carBAMazepine (EPITOL) 200 mg tablet Take 3 tablets by mouth      Omega-3 Fatty Acids (FISH OIL) 1,000 mg Take 1 capsule by mouth 2 (two) times a day      risperiDONE (RisperDAL) 1 mg tablet Take 1 mg by mouth daily        sertraline (ZOLOFT) 100 mg tablet Take 1 tablet by mouth daily      simvastatin (ZOCOR) 20 mg tablet Take 1 tablet (20 mg total) by mouth daily 90 tablet 1    ziprasidone (GEODON) 60 mg capsule Two tablets once daily       Linaclotide (LINZESS) 290 MCG CAPS Take 1 capsule by mouth daily 30 capsule 1    neomycin-polymyxin-hydrocortisone (CORTISPORIN) 0 35%-10,000 units/mL-1% otic suspension Administer 3 drops into both ears 2 (two) times a day for 4 days 1 2 mL 0     No current facility-administered medications for this visit  No Known Allergies  Immunization History   Administered Date(s) Administered    Tdap 11/04/2010       Patient Care Team:  Yaquelin Patel DO as PCP - General  MD Radha Hooper MD  Vocent, CRESCENCIO Lopez PA-C    Medicare Screening Tests and Risk Assessments:  Ash Zuñiga is here for her Subsequent Wellness visit  Last Medicare Wellness visit information reviewed, patient interviewed and updates made to the record today  Health Risk Assessment:  Patient rates overall health as good  Patient feels that their physical health rating is Same  Eyesight was rated as Same  Hearing was rated as Same  Patient feels that their emotional and mental health rating is Same  Pain experienced by patient in the last 7 days has been Some  Patient's pain rating has been 5/10  Patient states that she has experienced no weight loss or gain in last 6 months  Emotional/Mental Health:  Patient has been feeling nervous/anxious  PHQ-9 Depression Screening:    Frequency of the following problems over the past two weeks:      1  Little interest or pleasure in doing things: 1 - several days      2  Feeling down, depressed, or hopeless: 1 - several days      3   Trouble falling or staying asleep, or sleeping too much: 0 - not at all      4  Feeling tired or having little energy: 1 - several days      5  Poor appetite or overeatin - more than half the days      6  Feeling bad about yourself - or that you are a failure or have let yourself or your family down: 0 - not at all      7  Trouble concentrating on things, such as reading the newspaper or watching television: 0 - not at all      8  Moving or speaking so slowly that other people could have noticed  Or the opposite - being so fidgety or restless that you have been moving around a lot more than usual: 0 - not at all      9  Thoughts that you would be better off dead, or of hurting yourself in some way: 0 - not at all  PHQ-2 Score: 2  PHQ-9 Score: 5    Broken Bones/Falls: Fall Risk Assessment:    In the past year, patient has experienced: No history of falling in past year          Bladder/Bowel:  Patient reports no loss of bowel control  Immunizations:  Patient has not had a flu vaccination within the last year  Patient has not received a pneumonia shot  Patient has not received a shingles shot  Patient has not received tetanus/diphtheria shot  Home Safety:  Patient does not have trouble with stairs inside or outside of their home  Patient currently reports that there are no safety hazards present in home, working smoke alarms, no working carbon monoxide detectors  Preventative Screenings:   Breast cancer screening performed, colon cancer screen completed, cholesterol screen completed, glaucoma eye exam completed,     Nutrition:  Current diet: Regular with servings of the following:    Medications:  Patient is not currently taking any over-the-counter supplements  Patient is able to manage medications  Lifestyle Choices:  Patient reports no tobacco use  Patient has smoked or used tobacco in the past   Patient has stopped her tobacco use  Patient reports no alcohol use  Patient drives a vehicle  Patient wears seat belt  Activities of Daily Living:  Can get out of bed by his or her self, able to dress self, able to make own meals, able to do own shopping, able to bathe self, can do own laundry/housekeeping, can manage own money, pay bills and track expenses    Previous Hospitalizations:  No hospitalization or ED visit in past 12 months        Advanced Directives:  Patient has not decided on power of   Patient has not completed advanced directive  Preventative Screening/Counseling:      Cardiovascular:      General: Screening Current          Diabetes:      General: Screening Current          Colorectal Cancer:      General: Screening Current          Breast Cancer:      General: Screening Current          Cervical Cancer:      General: Patient Declines          Osteoporosis:      General: Patient Declines          AAA:      General: Patient Declines          Glaucoma:      General: Screening Current          HIV:      General: Screening Not Indicated          Hepatitis C:      General: Screening Not Indicated        Advanced Directives:   Patient has no living will for healthcare, does not have durable POA for healthcare, patient does not have an advanced directive  Information on ACP and/or AD not provided  No 5 wishes given  End of life assessment reviewed with patient  Provider agrees with end of life decisions   Immunizations:      Influenza: Patient Declines      Pneumococcal: Patient Declines      Shingrix: Patient Declines      Hepatitis B (Low risk patients): Series Not Indicated      Zostavax: Patient Declines      TD: Patient Declines      TDAP: Patient Declines      Other Preventative Counseling (Non-Medicare):   Fall Prevention, Increase physical activity, Nutrition Counseling and Car/seat belt/driving safety reviewed

## 2019-02-15 NOTE — PROGRESS NOTES
Assessment/Plan:     Diagnoses and all orders for this visit:    Type 2 diabetes mellitus with diabetic neuropathy, without long-term current use of insulin ([facility])  -     Cancel: POCT hemoglobin A1c  -     Basic metabolic panel; Future  -     Hemoglobin A1C; Future    Neck mass  -     CT soft tissue neck w contrast; Future    Chronic idiopathic constipation  -     Linaclotide (LINZESS) 290 MCG CAPS; Take 1 capsule by mouth daily    Hyperlipidemia, unspecified hyperlipidemia type  -     simvastatin (ZOCOR) 20 mg tablet; Take 1 tablet (20 mg total) by mouth daily    Essential hypertension  -     atenolol (TENORMIN) 50 mg tablet; Take 1 tablet (50 mg total) by mouth daily    Screening for breast cancer  -     Mammo screening bilateral w cad; Future      patient was using samples of Linzess for chronic constipation worked really well  We no longer have samples  We will do a script to see how much it will cost  Otherwise will order a CT scan of her neck to workup this mass that she has near her right jaw  She has recently quit smoking has gained some weight so will need to watch her sugars  An A1c was ordered we refilled some of her medications  Script for mammogram was given  She will follow up in 6 months or sooner depending on the outcome of the CT scan and the lab work      Subjective:     Chief Complaint   Patient presents with    Well Check     6 month         Patient ID: Bertrand Moseley is a 62 y o  female      Patient is here for six-month checkup chronic conditions  Her complaint today is a lump on the side of her neck near her jaw bone that she 1st noticed a few weeks ago  Has not changed but is very hard to palpation  It is nontender  Also we reviewed her sugars  She has not had A1c in a while but her fasting sugars look okay  She needs refills of medications  Of note she is unable to have an MRI due to his stimulator that is in her head      The following portions of the patient's history were reviewed and updated as appropriate: allergies, current medications, past family history, past medical history, past social history, past surgical history and problem list     Review of Systems   Constitutional: Negative  HENT: Negative  Eyes: Negative  Respiratory: Negative  Cardiovascular: Negative  Gastrointestinal: Negative  Endocrine: Negative  Genitourinary: Negative  Musculoskeletal: Negative  Skin: Negative  Allergic/Immunologic: Negative  Neurological: Negative  Hematological: Negative  Psychiatric/Behavioral: Negative  All other systems reviewed and are negative  Objective:    Vitals:    02/15/19 0900   BP: 132/86   BP Location: Left arm   Patient Position: Sitting   Cuff Size: Large   Pulse: 68   Temp: 97 5 °F (36 4 °C)   TempSrc: Tympanic   SpO2: 97%   Weight: 106 kg (234 lb 9 6 oz)   Height: 5' 5" (1 651 m)          Physical Exam   Constitutional: She is oriented to person, place, and time  She appears well-developed and well-nourished  HENT:   Head: Normocephalic and atraumatic  Right Ear: External ear normal    Left Ear: External ear normal    Mouth/Throat: Oropharynx is clear and moist    Eyes: Pupils are equal, round, and reactive to light  Conjunctivae and EOM are normal    Neck: Normal range of motion  Hard marble sized lump on right neck near jaw bone   Cardiovascular: Normal rate, regular rhythm and normal heart sounds  Pulmonary/Chest: Effort normal and breath sounds normal    Abdominal: Soft  Bowel sounds are normal    Musculoskeletal: Normal range of motion  Neurological: She is alert and oriented to person, place, and time  She has normal reflexes  Skin: Skin is warm and dry  Psychiatric: She has a normal mood and affect  Her behavior is normal  Judgment and thought content normal    Nursing note and vitals reviewed

## 2019-03-05 ENCOUNTER — HOSPITAL ENCOUNTER (OUTPATIENT)
Dept: CT IMAGING | Facility: HOSPITAL | Age: 59
Discharge: HOME | End: 2019-03-05
Payer: MEDICARE

## 2019-03-05 DIAGNOSIS — R22.1 NECK MASS: ICD-10-CM

## 2019-03-05 PROCEDURE — 70491 CT SOFT TISSUE NECK W/DYE: CPT

## 2019-03-05 RX ADMIN — IOHEXOL 85 ML: 350 INJECTION, SOLUTION INTRAVENOUS at 13:28

## 2019-03-08 ENCOUNTER — TELEPHONE (OUTPATIENT)
Dept: FAMILY MEDICINE CLINIC | Facility: CLINIC | Age: 59
End: 2019-03-08

## 2019-03-18 ENCOUNTER — OFFICE VISIT (OUTPATIENT)
Dept: BEHAVIORAL/MENTAL HEALTH CLINIC | Facility: CLINIC | Age: 59
End: 2019-03-18
Payer: MEDICARE

## 2019-03-18 DIAGNOSIS — F31.63 BIPOLAR I DISORDER, MOST RECENT EPISODE MIXED, SEVERE WITHOUT PSYCHOTIC FEATURES (HCC): Primary | ICD-10-CM

## 2019-03-18 PROCEDURE — 90834 PSYTX W PT 45 MINUTES: CPT | Performed by: SOCIAL WORKER

## 2019-03-18 NOTE — PSYCH
Psychotherapy Provided: Individual Psychotherapy 45 minutes     Length of time in session: 45 minutes, follow up in 2 week    Goals addressed in session: Goal 1, Goal 2 and Goal 3      Pain:      moderate to severe    3    Current suicide risk : Low     D- Bess stated that she has been doing well  She stated that she has been enjoying watching her grandchildren and has also continued to work on her photography  She stated that they are moving forward with purchasing a home with their daughter and son in law  She is hoping to grow a [place] with their new location  Continuing to work on self care and also delegating tasks in the home  Reviewing and signing her treatment plan  Giving supportive therpay  A- Progress - Continuing to process her emotions  P-Continue treatment    [address]: Diagnosis and Treatment Plan explained to Normaon Chaparro relates understanding diagnosis and is agreeable to Treatment Plan   Yes

## 2019-03-23 ENCOUNTER — HOSPITAL ENCOUNTER (OUTPATIENT)
Dept: MAMMOGRAPHY | Facility: CLINIC | Age: 59
Discharge: HOME | End: 2019-03-23
Payer: MEDICARE

## 2019-03-23 VITALS — WEIGHT: 234 LBS | HEIGHT: 65 IN | BODY MASS INDEX: 38.99 KG/M2

## 2019-03-23 DIAGNOSIS — Z12.39 SCREENING FOR BREAST CANCER: ICD-10-CM

## 2019-03-23 PROCEDURE — 77067 SCR MAMMO BI INCL CAD: CPT

## 2019-03-25 ENCOUNTER — TELEPHONE (OUTPATIENT)
Dept: FAMILY MEDICINE CLINIC | Facility: CLINIC | Age: 59
End: 2019-03-25

## 2019-03-25 DIAGNOSIS — E11.40 TYPE 2 DIABETES MELLITUS WITH DIABETIC NEUROPATHY, WITHOUT LONG-TERM CURRENT USE OF INSULIN (HCC): Primary | ICD-10-CM

## 2019-03-25 NOTE — PROGRESS NOTES
Spoke to patient advised to schedule an appointment with an eye doctor, also to ask her to ask Dr Leticia Lara, foot doctor, to send correspondence letter of diabetic foot exam, and also ordered an Urine Micro Albumin to get done before next office visit, sent requisition to patient for [address] [place] [address]

## 2019-03-29 ENCOUNTER — CLINICAL SUPPORT (OUTPATIENT)
Dept: GASTROENTEROLOGY | Facility: CLINIC | Age: 59
End: 2019-03-29

## 2019-03-29 VITALS — BODY MASS INDEX: 36.65 KG/M2 | WEIGHT: 220 LBS | HEIGHT: 65 IN

## 2019-03-29 DIAGNOSIS — Z86.010 HISTORY OF COLON POLYPS: Primary | ICD-10-CM

## 2019-03-29 RX ORDER — ATENOLOL AND CHLORTHALIDONE TABLET 50; 25 MG/1; MG/1
1 TABLET ORAL DAILY
COMMUNITY
End: 2019-06-03 | Stop reason: ALTCHOICE

## 2019-03-29 NOTE — PROGRESS NOTES
Pt seen for colon prep dx history of polyps  meds reviewed  Instructions given  Suprep rx sent to pharmacy

## 2019-04-08 ENCOUNTER — OFFICE VISIT (OUTPATIENT)
Dept: BEHAVIORAL/MENTAL HEALTH CLINIC | Facility: CLINIC | Age: 59
End: 2019-04-08
Payer: MEDICARE

## 2019-04-08 DIAGNOSIS — F31.63 BIPOLAR I DISORDER, MOST RECENT EPISODE MIXED, SEVERE WITHOUT PSYCHOTIC FEATURES (HCC): Primary | ICD-10-CM

## 2019-04-08 PROCEDURE — 90834 PSYTX W PT 45 MINUTES: CPT | Performed by: SOCIAL WORKER

## 2019-04-22 ENCOUNTER — OFFICE VISIT (OUTPATIENT)
Dept: BEHAVIORAL/MENTAL HEALTH CLINIC | Facility: CLINIC | Age: 59
End: 2019-04-22
Payer: MEDICARE

## 2019-04-22 DIAGNOSIS — F31.63 BIPOLAR I DISORDER, MOST RECENT EPISODE MIXED, SEVERE WITHOUT PSYCHOTIC FEATURES (HCC): Primary | ICD-10-CM

## 2019-04-22 PROCEDURE — 90834 PSYTX W PT 45 MINUTES: CPT | Performed by: SOCIAL WORKER

## 2019-05-13 ENCOUNTER — OFFICE VISIT (OUTPATIENT)
Dept: BEHAVIORAL/MENTAL HEALTH CLINIC | Facility: CLINIC | Age: 59
End: 2019-05-13
Payer: MEDICARE

## 2019-05-13 DIAGNOSIS — F31.63 BIPOLAR I DISORDER, MOST RECENT EPISODE MIXED, SEVERE WITHOUT PSYCHOTIC FEATURES (HCC): Primary | ICD-10-CM

## 2019-05-13 PROCEDURE — 90834 PSYTX W PT 45 MINUTES: CPT | Performed by: SOCIAL WORKER

## 2019-05-14 DIAGNOSIS — F31.63 SEVERE MIXED BIPOLAR I DISORDER WITHOUT PSYCHOTIC FEATURES (HCC): Primary | ICD-10-CM

## 2019-05-14 RX ORDER — CARBAMAZEPINE 200 MG/1
600 TABLET ORAL
Qty: 90 TABLET | Refills: 1 | Status: SHIPPED | OUTPATIENT
Start: 2019-05-14 | End: 2019-07-26 | Stop reason: SDUPTHER

## 2019-06-03 ENCOUNTER — OFFICE VISIT (OUTPATIENT)
Dept: BEHAVIORAL/MENTAL HEALTH CLINIC | Facility: CLINIC | Age: 59
End: 2019-06-03
Payer: MEDICARE

## 2019-06-03 ENCOUNTER — OFFICE VISIT (OUTPATIENT)
Dept: FAMILY MEDICINE CLINIC | Facility: CLINIC | Age: 59
End: 2019-06-03
Payer: MEDICARE

## 2019-06-03 VITALS
HEART RATE: 68 BPM | BODY MASS INDEX: 38.69 KG/M2 | DIASTOLIC BLOOD PRESSURE: 94 MMHG | HEIGHT: 65 IN | OXYGEN SATURATION: 97 % | TEMPERATURE: 98.9 F | WEIGHT: 232.2 LBS | SYSTOLIC BLOOD PRESSURE: 136 MMHG

## 2019-06-03 DIAGNOSIS — H61.22 HEARING LOSS OF LEFT EAR DUE TO CERUMEN IMPACTION: ICD-10-CM

## 2019-06-03 DIAGNOSIS — F31.63 BIPOLAR I DISORDER, MOST RECENT EPISODE MIXED, SEVERE WITHOUT PSYCHOTIC FEATURES (HCC): Primary | ICD-10-CM

## 2019-06-03 DIAGNOSIS — E11.40 TYPE 2 DIABETES MELLITUS WITH DIABETIC NEUROPATHY, WITHOUT LONG-TERM CURRENT USE OF INSULIN (HCC): Primary | ICD-10-CM

## 2019-06-03 DIAGNOSIS — Z11.59 ENCOUNTER FOR HEPATITIS C SCREENING TEST FOR LOW RISK PATIENT: ICD-10-CM

## 2019-06-03 DIAGNOSIS — E66.9 OBESITY (BMI 35.0-39.9 WITHOUT COMORBIDITY): ICD-10-CM

## 2019-06-03 PROBLEM — L97.512 RIGHT FOOT ULCER, WITH FAT LAYER EXPOSED (HCC): Status: RESOLVED | Noted: 2019-06-03 | Resolved: 2019-06-03

## 2019-06-03 PROBLEM — L97.512 RIGHT FOOT ULCER, WITH FAT LAYER EXPOSED (HCC): Status: ACTIVE | Noted: 2019-06-03

## 2019-06-03 PROCEDURE — 90834 PSYTX W PT 45 MINUTES: CPT | Performed by: SOCIAL WORKER

## 2019-06-03 PROCEDURE — 99214 OFFICE O/P EST MOD 30 MIN: CPT | Performed by: FAMILY MEDICINE

## 2019-06-03 PROCEDURE — 69209 REMOVE IMPACTED EAR WAX UNI: CPT | Performed by: FAMILY MEDICINE

## 2019-06-17 ENCOUNTER — OFFICE VISIT (OUTPATIENT)
Dept: PODIATRY | Facility: CLINIC | Age: 59
End: 2019-06-17
Payer: MEDICARE

## 2019-06-17 VITALS
BODY MASS INDEX: 36.65 KG/M2 | SYSTOLIC BLOOD PRESSURE: 157 MMHG | WEIGHT: 220 LBS | HEIGHT: 65 IN | DIASTOLIC BLOOD PRESSURE: 82 MMHG

## 2019-06-17 DIAGNOSIS — L84 CORNS AND CALLOSITIES: Primary | ICD-10-CM

## 2019-06-17 DIAGNOSIS — B35.1 TINEA UNGUIUM: ICD-10-CM

## 2019-06-17 DIAGNOSIS — E11.40 TYPE 2 DIABETES MELLITUS WITH DIABETIC NEUROPATHY, WITHOUT LONG-TERM CURRENT USE OF INSULIN (HCC): ICD-10-CM

## 2019-06-17 PROBLEM — Z89.429 ACQUIRED ABSENCE OF OTHER TOE(S), UNSPECIFIED SIDE (HCC): Status: ACTIVE | Noted: 2019-06-17

## 2019-06-17 PROCEDURE — 11055 PARING/CUTG B9 HYPRKER LES 1: CPT | Performed by: PODIATRIST

## 2019-06-17 PROCEDURE — RECHECK: Performed by: PODIATRIST

## 2019-06-17 PROCEDURE — 11721 DEBRIDE NAIL 6 OR MORE: CPT | Performed by: PODIATRIST

## 2019-06-20 ENCOUNTER — OFFICE VISIT (OUTPATIENT)
Dept: OBGYN CLINIC | Facility: CLINIC | Age: 59
End: 2019-06-20
Payer: MEDICARE

## 2019-06-20 VITALS
WEIGHT: 227 LBS | HEIGHT: 65 IN | SYSTOLIC BLOOD PRESSURE: 130 MMHG | BODY MASS INDEX: 37.82 KG/M2 | DIASTOLIC BLOOD PRESSURE: 70 MMHG

## 2019-06-20 DIAGNOSIS — Z01.419 WOMEN'S ANNUAL ROUTINE GYNECOLOGICAL EXAMINATION: ICD-10-CM

## 2019-06-20 DIAGNOSIS — Z12.31 VISIT FOR SCREENING MAMMOGRAM: ICD-10-CM

## 2019-06-20 DIAGNOSIS — Z11.51 SCREENING FOR HPV (HUMAN PAPILLOMAVIRUS): ICD-10-CM

## 2019-06-20 DIAGNOSIS — Z12.4 SCREENING FOR CERVICAL CANCER: Primary | ICD-10-CM

## 2019-06-20 DIAGNOSIS — B96.89 BACTERIAL VAGINOSIS: ICD-10-CM

## 2019-06-20 DIAGNOSIS — B37.3 VAGINAL CANDIDIASIS: ICD-10-CM

## 2019-06-20 DIAGNOSIS — N76.0 BACTERIAL VAGINOSIS: ICD-10-CM

## 2019-06-20 DIAGNOSIS — N95.2 VAGINAL ATROPHY: ICD-10-CM

## 2019-06-20 LAB — SL AMB POCT WET MOUNT: 4.5

## 2019-06-20 PROCEDURE — G0145 SCR C/V CYTO,THINLAYER,RESCR: HCPCS | Performed by: OBSTETRICS & GYNECOLOGY

## 2019-06-20 PROCEDURE — 87624 HPV HI-RISK TYP POOLED RSLT: CPT | Performed by: OBSTETRICS & GYNECOLOGY

## 2019-06-20 PROCEDURE — G0101 CA SCREEN;PELVIC/BREAST EXAM: HCPCS | Performed by: OBSTETRICS & GYNECOLOGY

## 2019-06-20 RX ORDER — FLUCONAZOLE 150 MG/1
150 TABLET ORAL ONCE
Qty: 2 TABLET | Refills: 0 | Status: SHIPPED | OUTPATIENT
Start: 2019-06-20 | End: 2019-06-20

## 2019-06-20 RX ORDER — METRONIDAZOLE 500 MG/1
500 TABLET ORAL 2 TIMES DAILY
Qty: 14 TABLET | Refills: 0 | Status: SHIPPED | OUTPATIENT
Start: 2019-06-20 | End: 2019-06-27

## 2019-06-21 ENCOUNTER — OFFICE VISIT (OUTPATIENT)
Dept: OBGYN CLINIC | Facility: CLINIC | Age: 59
End: 2019-06-21
Payer: MEDICARE

## 2019-06-21 ENCOUNTER — PROCEDURE VISIT (OUTPATIENT)
Dept: OBGYN CLINIC | Facility: CLINIC | Age: 59
End: 2019-06-21
Payer: MEDICARE

## 2019-06-21 VITALS
HEIGHT: 65 IN | DIASTOLIC BLOOD PRESSURE: 84 MMHG | BODY MASS INDEX: 38.32 KG/M2 | WEIGHT: 230 LBS | SYSTOLIC BLOOD PRESSURE: 142 MMHG

## 2019-06-21 DIAGNOSIS — N76.0 BACTERIAL VAGINOSIS: ICD-10-CM

## 2019-06-21 DIAGNOSIS — B96.89 BACTERIAL VAGINOSIS: ICD-10-CM

## 2019-06-21 DIAGNOSIS — N95.2 VAGINAL ATROPHY: Primary | ICD-10-CM

## 2019-06-21 DIAGNOSIS — N95.0 POSTMENOPAUSAL BLEEDING: Primary | ICD-10-CM

## 2019-06-21 DIAGNOSIS — B37.3 VAGINAL CANDIDIASIS: ICD-10-CM

## 2019-06-21 DIAGNOSIS — N95.0 POST-MENOPAUSAL BLEEDING: ICD-10-CM

## 2019-06-21 PROCEDURE — 76831 ECHO EXAM UTERUS: CPT | Performed by: OBSTETRICS & GYNECOLOGY

## 2019-06-21 PROCEDURE — 58340 CATHETER FOR HYSTEROGRAPHY: CPT | Performed by: OBSTETRICS & GYNECOLOGY

## 2019-06-21 PROCEDURE — 76830 TRANSVAGINAL US NON-OB: CPT | Performed by: OBSTETRICS & GYNECOLOGY

## 2019-06-21 PROCEDURE — 99212 OFFICE O/P EST SF 10 MIN: CPT | Performed by: OBSTETRICS & GYNECOLOGY

## 2019-06-21 PROCEDURE — 88305 TISSUE EXAM BY PATHOLOGIST: CPT | Performed by: PATHOLOGY

## 2019-06-21 PROCEDURE — 58100 BIOPSY OF UTERUS LINING: CPT | Performed by: OBSTETRICS & GYNECOLOGY

## 2019-06-24 ENCOUNTER — APPOINTMENT (OUTPATIENT)
Dept: LAB | Facility: CLINIC | Age: 59
End: 2019-06-24
Payer: MEDICARE

## 2019-06-24 DIAGNOSIS — E11.40 TYPE 2 DIABETES MELLITUS WITH DIABETIC NEUROPATHY, WITHOUT LONG-TERM CURRENT USE OF INSULIN (HCC): ICD-10-CM

## 2019-06-24 DIAGNOSIS — Z11.59 ENCOUNTER FOR HEPATITIS C SCREENING TEST FOR LOW RISK PATIENT: ICD-10-CM

## 2019-06-24 DIAGNOSIS — E11.40 TYPE 2 DIABETES MELLITUS WITH DIABETIC NEUROPATHY, WITHOUT LONG-TERM CURRENT USE OF INSULIN (HCC): Primary | ICD-10-CM

## 2019-06-24 LAB
CREAT UR-MCNC: 56 MG/DL
EST. AVERAGE GLUCOSE BLD GHB EST-MCNC: 194 MG/DL
HBA1C MFR BLD: 8.4 % (ref 4.2–6.3)
HCV AB SER QL: NORMAL
MICROALBUMIN UR-MCNC: 22 MG/L (ref 0–20)
MICROALBUMIN/CREAT 24H UR: 39 MG/G CREATININE (ref 0–30)

## 2019-06-24 PROCEDURE — 82570 ASSAY OF URINE CREATININE: CPT

## 2019-06-24 PROCEDURE — 36415 COLL VENOUS BLD VENIPUNCTURE: CPT

## 2019-06-24 PROCEDURE — 86803 HEPATITIS C AB TEST: CPT

## 2019-06-24 PROCEDURE — 82043 UR ALBUMIN QUANTITATIVE: CPT

## 2019-06-24 PROCEDURE — 83036 HEMOGLOBIN GLYCOSYLATED A1C: CPT

## 2019-06-25 LAB
HPV HR 12 DNA CVX QL NAA+PROBE: NEGATIVE
HPV16 DNA CVX QL NAA+PROBE: NEGATIVE
HPV18 DNA CVX QL NAA+PROBE: NEGATIVE

## 2019-06-26 LAB
LAB AP GYN PRIMARY INTERPRETATION: NORMAL
Lab: NORMAL

## 2019-07-08 ENCOUNTER — SOCIAL WORK (OUTPATIENT)
Dept: BEHAVIORAL/MENTAL HEALTH CLINIC | Facility: CLINIC | Age: 59
End: 2019-07-08
Payer: MEDICARE

## 2019-07-08 DIAGNOSIS — F31.63 BIPOLAR I DISORDER, MOST RECENT EPISODE MIXED, SEVERE WITHOUT PSYCHOTIC FEATURES (HCC): Primary | ICD-10-CM

## 2019-07-08 PROCEDURE — 90834 PSYTX W PT 45 MINUTES: CPT | Performed by: SOCIAL WORKER

## 2019-07-08 NOTE — PSYCH
Psychotherapy Provided: Individual Psychotherapy 45 minutes     Length of time in session: 45 minutes, follow up in 2 week    Goals addressed in session: Goal 1     Pain:      none    0    Current suicide risk : Low     DChan Kellogg presented for treatment with her daughter and grandchildren  She stated that they had been on vacation in [place] because they are now looking for homes there  She stated that her son in law ill be transferring jobs there and that they have found a home that they have placed a bid on  She stated that they are very excited about this and that they re currently in the process of trying to sell their home  She shared that most of the family has been very supportive of this  She stated that she has not yet shared it with her parents  She discussed the stress of trying to self their home  Also discussed finding providers when she moves in order to make her transition smooth  Continuing to work on healthy ways for her to cope with her stress  Giving supportive therapy  A- Progress - Continuing to process her emotions  P-Continue treatment    [address]: Diagnosis and Treatment Plan explained to Aristeo Holley relates understanding diagnosis and is agreeable to Treatment Plan   Yes

## 2019-07-15 ENCOUNTER — OFFICE VISIT (OUTPATIENT)
Dept: FAMILY MEDICINE CLINIC | Facility: CLINIC | Age: 59
End: 2019-07-15
Payer: MEDICARE

## 2019-07-15 VITALS
OXYGEN SATURATION: 98 % | BODY MASS INDEX: 38.29 KG/M2 | DIASTOLIC BLOOD PRESSURE: 84 MMHG | SYSTOLIC BLOOD PRESSURE: 130 MMHG | HEART RATE: 78 BPM | TEMPERATURE: 99.4 F | HEIGHT: 65 IN | WEIGHT: 229.8 LBS

## 2019-07-15 DIAGNOSIS — L03.115 CELLULITIS OF RIGHT LOWER EXTREMITY: Primary | ICD-10-CM

## 2019-07-15 PROCEDURE — 99213 OFFICE O/P EST LOW 20 MIN: CPT | Performed by: FAMILY MEDICINE

## 2019-07-15 RX ORDER — CEPHALEXIN 500 MG/1
500 CAPSULE ORAL EVERY 8 HOURS SCHEDULED
Qty: 21 CAPSULE | Refills: 0 | Status: SHIPPED | OUTPATIENT
Start: 2019-07-15 | End: 2019-07-22

## 2019-07-15 NOTE — PROGRESS NOTES
+    Assessment/Plan:     Diagnoses and all orders for this visit:    Cellulitis of right lower extremity  -     cephalexin (KEFLEX) 500 mg capsule; Take 1 capsule (500 mg total) by mouth every 8 (eight) hours for 7 days      discussed local wound care  Will start Keflex  Follow-up as needed  Patient is moving in 3 weeks and she was instructed to find a physician out there for her other chronic medical care      Subjective:     Chief Complaint   Patient presents with    Wound Check     X4 DAYS         Patient ID: Selina Burnham is a 62 y o  female  Here for wound on R shin  Patient accidentally hit shin on chair  Has wound and concerned with spreading  redness    Ankle Swelling   The symptoms are aggravated by palpation  The following portions of the patient's history were reviewed and updated as appropriate: allergies, current medications, past family history, past medical history, past social history, past surgical history and problem list     Review of Systems   Constitutional: Negative  HENT: Negative  Eyes: Negative  Respiratory: Negative  Cardiovascular: Negative  Gastrointestinal: Negative  Endocrine: Negative  Genitourinary: Negative  Musculoskeletal: Negative  Skin: Negative  Allergic/Immunologic: Negative  Neurological: Negative  Hematological: Negative  Psychiatric/Behavioral: Negative  All other systems reviewed and are negative  Objective:    Vitals:    07/15/19 1508   BP: 130/84   BP Location: Left arm   Patient Position: Sitting   Cuff Size: Extra-Large   Pulse: 78   Temp: 99 4 °F (37 4 °C)   TempSrc: Tympanic   SpO2: 98%   Weight: 104 kg (229 lb 12 8 oz)   Height: 5' 5" (1 651 m)          Physical Exam   Constitutional: She is oriented to person, place, and time  She appears well-developed and well-nourished  HENT:   Head: Normocephalic and atraumatic     Right Ear: External ear normal    Left Ear: External ear normal    Mouth/Throat: Oropharynx is clear and moist    Eyes: Pupils are equal, round, and reactive to light  Conjunctivae and EOM are normal    Neck: Normal range of motion  Cardiovascular: Normal rate, regular rhythm and normal heart sounds  Pulmonary/Chest: Effort normal and breath sounds normal    Abdominal: Soft  Bowel sounds are normal    Musculoskeletal: Normal range of motion  Neurological: She is alert and oriented to person, place, and time  She has normal reflexes  Skin: Skin is warm and dry  Nickel sized wound on right anterior shin with scab and some spreading redness around the wound   Psychiatric: She has a normal mood and affect  Her behavior is normal  Judgment and thought content normal    Nursing note and vitals reviewed        Answers for HPI/ROS submitted by the patient on 7/15/2019   Lower extremity pain  Incident occurred: 3 to 5 days ago  Incident location: other  Injury mechanism: other  Pain location: right leg  Pain quality: aching  Pain - numeric: 2/10  Pain course: fluctuating  tingling: No  inability to bear weight: No  loss of motion: No  loss of sensation: No  muscle weakness: No  Foreign body present: no foreign bodies

## 2019-07-16 ENCOUNTER — OFFICE VISIT (OUTPATIENT)
Dept: PODIATRY | Facility: CLINIC | Age: 59
End: 2019-07-16
Payer: MEDICARE

## 2019-07-16 VITALS
BODY MASS INDEX: 38.15 KG/M2 | SYSTOLIC BLOOD PRESSURE: 132 MMHG | DIASTOLIC BLOOD PRESSURE: 81 MMHG | WEIGHT: 229 LBS | HEIGHT: 65 IN

## 2019-07-16 DIAGNOSIS — M20.42 HAMMER TOE OF LEFT FOOT: ICD-10-CM

## 2019-07-16 DIAGNOSIS — L97.522 ULCER OF LEFT FOOT, WITH FAT LAYER EXPOSED (HCC): Primary | ICD-10-CM

## 2019-07-16 DIAGNOSIS — E11.40 TYPE 2 DIABETES MELLITUS WITH DIABETIC NEUROPATHY, WITHOUT LONG-TERM CURRENT USE OF INSULIN (HCC): ICD-10-CM

## 2019-07-16 DIAGNOSIS — S90.422A BLISTER OF LEFT GREAT TOE, INITIAL ENCOUNTER: ICD-10-CM

## 2019-07-16 PROCEDURE — 11042 DBRDMT SUBQ TIS 1ST 20SQCM/<: CPT | Performed by: PODIATRIST

## 2019-07-16 PROCEDURE — 99213 OFFICE O/P EST LOW 20 MIN: CPT | Performed by: PODIATRIST

## 2019-07-16 NOTE — PROGRESS NOTES
Assessment/Plan:       Diagnoses and all orders for this visit:    Ulcer of left foot, with fat layer exposed ([address] )  -     Cam Boot    Type 2 diabetes mellitus with diabetic neuropathy, without long-term current use of insulin ([facility])    Hammer toe of left foot    Blister of left great toe, initial encounter  -     Cam Boot      1  Procedure:  Ulcer left great toe debridement:  Oral consent was obtained   And time-out performed  Using a 15 blade and excisional debridement to subcu fat was performed with removal of necrotic skin soft tissue fat and biofilm  Mild bleeding was controlled with pressure  There was no  Pain due to neuropathy  No pus was in countered  There is no undermining or sinus  There is no cellulitis  Wound was dressed with triple antibiotic and dry sterile dressing  CAM boot was ordered  Stressed rest in offloading of the great toe  2    Educated patient on the mechanical deformity of her great toe  She has a rigid plantar flexed interphalangeal joint of the great toe causing repetitive trauma to the distal aspect of the toe  Once the ulceration is healed this deformity should be corrected in order to reduce the chance of ulceration and infection in the future  3    Patient is moving to [place] in 2-3 weeks  She needs to see a podiatrist immediately  She is high risk for further complications to this foot  I stressed the need to not carry heavy loads with this foot  I did order a CAM boot but stressed that the boot will not overcome her moving heavy boxes all day during her moved to [place]  I spent a good amount of time discussing signs of infection to monitor for  If they are suspected she should go to the hospital for antibiotics immediately or risk infection in her toe causing amputation  Subjective:      Patient ID: Walt Rice is a 62 y o  female  Patient has PMH significant for DM neuropathy, right Charcot, and chronic callus and ulcerations   She has had infections in her right foot previously except the tip of one toe  Her last A1C was 8 4  She quit smoking but is exposed to second hand smoke by her  "all the time " Her feet are numb  She feels well overall  The blister is draining  The following portions of the patient's history were reviewed and updated as appropriate: allergies, current medications, past family history, past medical history, past social history, past surgical history and problem list     Review of Systems   Constitutional: Negative for chills  Respiratory: Negative for shortness of breath  Cardiovascular: Negative for leg swelling  Gastrointestinal: Negative for diarrhea and nausea  Musculoskeletal: Negative for arthralgias and gait problem  Skin: Positive for color change and wound  Neurological: Positive for numbness  Objective:      /81   Ht 5' 5" (1 651 m)   Wt 104 kg (229 lb)   BMI 38 11 kg/m²          Physical Exam   Constitutional: She appears well-developed and well-nourished  No distress  Cardiovascular:   Pulses:       Popliteal pulses are 2+ on the right side, and 2+ on the left side  Dorsalis pedis pulses are 2+ on the right side, and 2+ on the left side  There is no   Cellulitis   Musculoskeletal:        Feet:    Neurological: A sensory deficit is present  Lack of pinprick sensation   Lack of vibratory sensation   Lack of proprioception   Skin: Skin is warm and dry  Capillary refill takes less than 2 seconds  Hemorrhagic bulla to the medial aspect of the left great toe with draining serosanguineous fluid  Post debridement wound measures 2 x 1 x 0 3 cm with small area of exposed fat  No probe to bone  Vitals reviewed

## 2019-07-22 ENCOUNTER — SOCIAL WORK (OUTPATIENT)
Dept: BEHAVIORAL/MENTAL HEALTH CLINIC | Facility: CLINIC | Age: 59
End: 2019-07-22
Payer: MEDICARE

## 2019-07-22 DIAGNOSIS — F31.63 BIPOLAR I DISORDER, MOST RECENT EPISODE MIXED, SEVERE WITHOUT PSYCHOTIC FEATURES (HCC): Primary | ICD-10-CM

## 2019-07-22 PROCEDURE — 90834 PSYTX W PT 45 MINUTES: CPT | Performed by: SOCIAL WORKER

## 2019-07-22 NOTE — PSYCH
Psychotherapy Provided: Individual Psychotherapy 45 minutes     Length of time in session: 45 minutes, follow up in 2 week    Goals addressed in session: Goal 1, Goal 2 and Goal 3      Pain:      none    0    Current suicide risk : Low     DChan Kellogg stated that they have sold their home and that are moving to [facility] at the beginning of August  She stated that they found a home and are very excited about the move  She stated that her son in law is interviewing at a [employer] and that her daughter is researching being able to work from home with her present company  She stated that her grandchildren have been misbehaving more since they have all been living together  She stated that this can be a challenge at times  She also stated that her  has not been helpful which increases her stress  Processing her emotions and discussing ways for her to be able to cope with the stress of the move as well as the children's behavior  Also processing her emotions regarding treatment closure  Giving supportive therapy  A- Progress - Continuing to process her emotions  P-Continue treatment    [address]: Diagnosis and Treatment Plan explained to Sandra Morales relates understanding diagnosis and is agreeable to Treatment Plan   Yes

## 2019-07-26 DIAGNOSIS — E78.5 HYPERLIPIDEMIA, UNSPECIFIED HYPERLIPIDEMIA TYPE: ICD-10-CM

## 2019-07-26 DIAGNOSIS — F31.63 SEVERE MIXED BIPOLAR I DISORDER WITHOUT PSYCHOTIC FEATURES (HCC): ICD-10-CM

## 2019-07-26 RX ORDER — SIMVASTATIN 20 MG
20 TABLET ORAL DAILY
Qty: 90 TABLET | Refills: 1 | Status: SHIPPED | OUTPATIENT
Start: 2019-07-26

## 2019-07-26 RX ORDER — CARBAMAZEPINE 200 MG/1
600 TABLET ORAL
Qty: 90 TABLET | Refills: 1 | Status: SHIPPED | OUTPATIENT
Start: 2019-07-26

## 2019-07-29 ENCOUNTER — OFFICE VISIT (OUTPATIENT)
Dept: PODIATRY | Facility: CLINIC | Age: 59
End: 2019-07-29
Payer: MEDICARE

## 2019-07-29 VITALS
WEIGHT: 230 LBS | DIASTOLIC BLOOD PRESSURE: 74 MMHG | SYSTOLIC BLOOD PRESSURE: 130 MMHG | BODY MASS INDEX: 38.32 KG/M2 | HEIGHT: 65 IN

## 2019-07-29 DIAGNOSIS — E11.40 TYPE 2 DIABETES MELLITUS WITH DIABETIC NEUROPATHY, WITHOUT LONG-TERM CURRENT USE OF INSULIN (HCC): ICD-10-CM

## 2019-07-29 DIAGNOSIS — L97.522 ULCER OF LEFT FOOT, WITH FAT LAYER EXPOSED (HCC): Primary | ICD-10-CM

## 2019-07-29 DIAGNOSIS — L03.032 CELLULITIS OF GREAT TOE, LEFT: ICD-10-CM

## 2019-07-29 PROCEDURE — 99213 OFFICE O/P EST LOW 20 MIN: CPT | Performed by: PODIATRIST

## 2019-07-29 PROCEDURE — 11042 DBRDMT SUBQ TIS 1ST 20SQCM/<: CPT | Performed by: PODIATRIST

## 2019-07-29 RX ORDER — SODIUM HYPOCHLORITE 2.5 MG/ML
1 SOLUTION TOPICAL DAILY
Qty: 473 ML | Refills: 0 | Status: SHIPPED | OUTPATIENT
Start: 2019-07-29

## 2019-07-29 RX ORDER — CEPHALEXIN 500 MG/1
500 CAPSULE ORAL 4 TIMES DAILY
Qty: 40 CAPSULE | Refills: 0 | Status: SHIPPED | OUTPATIENT
Start: 2019-07-29 | End: 2019-08-08

## 2019-07-29 NOTE — PROGRESS NOTES
Assessment/Plan:       Diagnoses and all orders for this visit:    Ulcer of left foot, with fat layer exposed ([address] )    Type 2 diabetes mellitus with diabetic neuropathy, without long-term current use of insulin ([facility])    Cellulitis of great toe, left            1   Procedure:  Ulcer left great toe debridement:  Oral consent was obtained   And time-out performed  Using a 15 blade and excisional debridement to subcutaneous tissues was performed with removal of necrotic skin soft tissue fat and biofilm  Mild bleeding was controlled with pressure  There was no pain due to neuropathy  No pus was noted  All the underminings were debrided  Maxorb Ag and DSD applied  Start her on [place] solution daily  2    She has mild cellulitis of left great toe  Start her on Keflex  Stressed on patient compliance about proper off-loading/ NWB with knee scooter  She will return in 1 week  Subjective:      Patient ID: Kavita Ramirez is a 62 y o  female  Patient returned for left foot ulcer  She noticed increased redness on left great toe for about 2 - 3 days  She admitted that she was on her foot a lot  She presents with CAM walker, but does not always wear it  No constitutional signs of infection  The following portions of the patient's history were reviewed and updated as appropriate: allergies, current medications, past family history, past medical history, past social history, past surgical history and problem list     Review of Systems   Constitutional: Negative for chills  Respiratory: Negative for shortness of breath  Cardiovascular: Negative for leg swelling  Gastrointestinal: Negative for diarrhea and nausea  Musculoskeletal: Negative for arthralgias and gait problem  Skin: Positive for color change and wound  Neurological: Positive for numbness           Objective:      /74   Ht 5' 5" (1 651 m)   Wt 104 kg (230 lb)   BMI 38 27 kg/m²          Physical Exam   Constitutional: She appears well-developed and well-nourished  No distress  Cardiovascular:   Pulses:       Popliteal pulses are 2+ on the right side, and 2+ on the left side  Dorsalis pedis pulses are 2+ on the right side, and 2+ on the left side  There is no   Cellulitis   Musculoskeletal:        Feet:    Neurological: A sensory deficit is present  Lack of pinprick sensation   Lack of vibratory sensation   Lack of proprioception   Skin: Skin is warm and dry  Capillary refill takes less than 2 seconds  Wound presents left hallux IPJ  Blister extending proximally with periwound callus and undermining  No ingris pus  No deep probing  Mild redness and edema left great toe  Post debridement wound measures 2 5 x 1 5 x 0 2 cm  Wound bed is fibrous  Vitals reviewed

## 2019-08-02 ENCOUNTER — OFFICE VISIT (OUTPATIENT)
Dept: PODIATRY | Facility: CLINIC | Age: 59
End: 2019-08-02
Payer: MEDICARE

## 2019-08-02 VITALS
SYSTOLIC BLOOD PRESSURE: 128 MMHG | HEIGHT: 65 IN | BODY MASS INDEX: 38.32 KG/M2 | DIASTOLIC BLOOD PRESSURE: 71 MMHG | WEIGHT: 230 LBS

## 2019-08-02 DIAGNOSIS — E11.40 TYPE 2 DIABETES MELLITUS WITH DIABETIC NEUROPATHY, WITHOUT LONG-TERM CURRENT USE OF INSULIN (HCC): Primary | ICD-10-CM

## 2019-08-02 DIAGNOSIS — L97.522 ULCER OF LEFT FOOT, WITH FAT LAYER EXPOSED (HCC): ICD-10-CM

## 2019-08-02 PROCEDURE — 11042 DBRDMT SUBQ TIS 1ST 20SQCM/<: CPT | Performed by: PODIATRIST

## 2019-08-02 NOTE — PROGRESS NOTES
Assessment:  1  Diabetic foot ulcer Left  2  Diabetic peripheral neuropathy    Plan:  1  PROCEDURE NOTE     A formal timeout including patient identification, laterality and existing allergies using [facility] was conducted  Procedure Performed: Debridement of subcutaneous tissue- >20 sq cm (72258)  Under aseptic technique, the wound was thoroughly explored and bluntly probed for undermining, deep sinus tracts and bone involvement  Sterile instrumentation including scalpel, forceps and curette used to excise the clearly delineated devitalized subcutaneous tissue (fibrotic tissue and necrotic non-viable portions of fat) exposing viable tissue  After debridement, bleeding in the wound bed base was noted indicated viable subcutaneous tissue had been exposed  Bleeding controlled with gentle pressure  Patient tolerated debridement without complications  The wound was dressed  Wound dressing technique and frequency described to patient  I am to be called if any signs of infection develop such as erythema, increased wound size or significant change in appearance of wound, odor, pain, increased or change in color of drainage, swelling, fever, chills, nightsweats  I reviewed these signs with the patient  I recommended limiting WB to bathroom priveleges and meal table and to use any prescribed offloading devices in an effort to allow proper rest and healing of the wounded area  I explained that good wound care and compliance are necessary to allow healing and to prevent toe loss or limb loss  2  She is moving to [place] next week and states she has a diabetic foot specialist appointment made in that town already  I told her to make sure she seems him within 2 weeks and that she can request to have our records transferred       Problem List Items Addressed This Visit        Endocrine    Type 2 diabetes mellitus with diabetic neuropathy, without long-term current use of insulin ([facility] ) - Primary Musculoskeletal and Integument    Ulcer of left foot, with fat layer exposed ([address] )             Diagnoses and all orders for this visit:    Type 2 diabetes mellitus with diabetic neuropathy, without long-term current use of insulin ([facility])    Ulcer of left foot, with fat layer exposed ([address] )          Subjective:      Patient ID: Pallavi Reyes is a 62 y o  female  [facility]  The following portions of the patient's history were reviewed and updated as appropriate: allergies, current medications, past family history, past medical history, past social history, past surgical history and problem list     Review of Systems   Constitutional: Negative  Objective:      /71   Ht 5' 5" (1 651 m)   Wt 104 kg (230 lb)   BMI 38 27 kg/m²          Physical Exam   Constitutional: She appears well-developed and well-nourished  No distress  Skin: She is not diaphoretic  Nursing note and vitals reviewed        Wound # 1   Location: Left plantar hallux  Length 0 4cm: Width 0 4cm: Depth 0 2cm:   Deepest Tissue Noted in Base: Dermal and SubQ  Probe to Bone?: No  Peripheral Skin Description: Callous  Granulation: 50% Fibrotic Tissue: 50% Necrotic Tissue: 0%  Drainage Amount: Mild  Signs of Infection: None  Total debrided 0 16 square centimeters

## 2019-08-02 NOTE — PATIENT INSTRUCTIONS
Debridement   WHAT YOU NEED TO KNOW:   Debridement is the removal of infected, damaged, or dead tissue so a wound can heal properly  You may need more than one debridement  DISCHARGE INSTRUCTIONS:   Medicines:   · Medicines  can help decrease pain or prevent or treat an infection  · Take your medicine as directed  Contact your healthcare provider if you think your medicine is not helping or if you have side effects  Tell him of her if you are allergic to any medicine  Keep a list of the medicines, vitamins, and herbs you take  Include the amounts, and when and why you take them  Bring the list or the pill bottles to follow-up visits  Carry your medicine list with you in case of an emergency  Follow up with your healthcare provider as directed: You may need to return to have your wound checked  Write down your questions so you remember to ask them during your visits  Care for your wound as directed:   · Keep your wound clean and dry  You may need to cover your wound when you bathe  · Limit movements,  such as stretching, to prevent bleeding, tearing, and swelling in your wound  · Protect your wound  Avoid sunlight for at least 6 months  Apply mild, unscented lotion or cream to the skin around your wound to keep it moist     · Do not smoke  If you smoke, it is never too late to quit  Smoking decreases blood flow to the wound and delays healing  Ask for information if you need help quitting  · Drink liquids as directed  Ask how much liquid to drink each day and which liquids are best for you  Liquids help keep your skin moist so your wound can heal      · Eat a variety of healthy foods  Foods rich in protein, such as meat, eggs, and dairy products, help repair tissue  Carbohydrate-rich foods, such as bread and cereals, help increase cell growth and decrease the risk for wound infection  Do not have caffeine  Ask if you should take vitamins   Vitamins A and C may help tissue formation and increase scar tissue strength  Contact your healthcare provider if:   · You have a fever  · Your pain gets worse or does not go away, even after treatment  · Your skin is red, swollen, or draining pus  · You have questions or concerns about your condition or care  Return to the emergency department if:   · Blood soaks through your bandage  · You have severe pain  © 2017 [address]  Information is for End User's use only and may not be sold, redistributed or otherwise used for commercial purposes  All illustrations and images included in CareNotes® are the copyrighted property of [employer]  or Rakesh Sánchez  The above information is an  only  It is not intended as medical advice for individual conditions or treatments  Talk to your doctor, nurse or pharmacist before following any medical regimen to see if it is safe and effective for you

## 2019-08-05 ENCOUNTER — SOCIAL WORK (OUTPATIENT)
Dept: BEHAVIORAL/MENTAL HEALTH CLINIC | Facility: CLINIC | Age: 59
End: 2019-08-05
Payer: MEDICARE

## 2019-08-05 DIAGNOSIS — F31.63 BIPOLAR I DISORDER, MOST RECENT EPISODE MIXED, SEVERE WITHOUT PSYCHOTIC FEATURES (HCC): Primary | ICD-10-CM

## 2019-08-05 PROCEDURE — 90834 PSYTX W PT 45 MINUTES: CPT | Performed by: SOCIAL WORKER

## 2019-08-05 NOTE — BH TREATMENT PLAN
Assessment/Plan:      There are no diagnoses linked to this encounter  Subjective:     Patient ID: Belinda Young is a 62 y o  female  Outpatient Discharge Summary:   Admission Date: 6/11/09  [place] was referred by Self  Discharge Date: 8/5/19    Discharge Diagnosis:    No diagnosis found  Treating Physician: Dr Walter Porter  Treatment Complications: None  Presenting Problem: Bess presented for treatment with issues of anxiety and depression  She had issues in her marriage as well as having issues of extreme anxiety when talking to people or being in large crowds  She struggled with self harm and suicidal ideation     Course of treatment includes:    individual therapy   Treatment Progress: excellent  Criteria for Discharge: completed treatment goals and objectives and is no longer in need of services  Aftercare recommendations include Continue treatment  Discharge Medications include:  Current Outpatient Medications:     buPROPion (WELLBUTRIN XL) 300 mg 24 hr tablet, Take 1 tablet by mouth daily, Disp: , Rfl:     carBAMazepine (EPITOL) 200 mg tablet, Take 3 tablets (600 mg total) by mouth daily at bedtime, Disp: 90 tablet, Rfl: 1    cephalexin (KEFLEX) 500 mg capsule, Take 1 capsule (500 mg total) by mouth 4 (four) times a day for 10 days, Disp: 40 capsule, Rfl: 0    Linaclotide (LINZESS) 290 MCG CAPS, Take 1 capsule by mouth daily, Disp: 30 capsule, Rfl: 1    metFORMIN (GLUCOPHAGE) 500 mg tablet, Take 1 tablet (500 mg total) by mouth 2 (two) times a day with meals, Disp: 60 tablet, Rfl: 3    Omega-3 Fatty Acids (FISH OIL) 1,000 mg, Take 1 capsule by mouth 2 (two) times a day, Disp: , Rfl:     risperiDONE (RisperDAL) 1 mg tablet, Take 1 mg by mouth daily  , Disp: , Rfl:     sertraline (ZOLOFT) 100 mg tablet, Take 1 tablet by mouth daily, Disp: , Rfl:     simvastatin (ZOCOR) 20 mg tablet, Take 1 tablet (20 mg total) by mouth daily, Disp: 90 tablet, Rfl: 1    sodium hypochlorite (DAKIN'S HALF-STRENGTH) external solution, Apply 1 application topically daily With dry gauze , Disp: 473 mL, Rfl: 0    ziprasidone (GEODON) 60 mg capsule, Two tablets once daily , Disp: , Rfl:     Prognosis: excellent

## 2019-08-05 NOTE — PSYCH
Psychotherapy Provided: Individual Psychotherapy 45 minutes     Length of time in session: 45 minutes, follow up in 0 week    Goals addressed in session: Goal 1, Goal 2 and Goal 3      Pain:      moderate to severe    4    Current suicide risk : Low     DChan Kellogg stated that everything is set for their move on Saturday  She stated that they are finishing up with packing  She shared that they are somewhat annoyed with their son in law for consistently missing work and not participating as much as he should with the moving preparations  She stated that their families have had gatherings for them over the weekend to say goodbye  She shared that she has researched and chosen doctors for treatment  Processing her emotions and discussing her progress during her treatment  Proceeding with termination of treatment  Giving supportive therapy  A- Progress - Completed all treatment goals  P-Terminating treatment    Behavioral Health Treatment Plan [facility]: Diagnosis and Treatment Plan explained to Aristeo Holley relates understanding diagnosis and is agreeable to Treatment Plan   Yes

## 2020-02-25 NOTE — PATIENT INSTRUCTIONS
Acute Bronchitis   WHAT YOU NEED TO KNOW:   Acute bronchitis is swelling and irritation in the air passages of your lungs  This irritation may cause you to cough or have other breathing problems  Acute bronchitis often starts because of another illness, such as a cold or the flu  The illness spreads from your nose and throat to your windpipe and airways  Bronchitis is often called a chest cold  Acute bronchitis lasts about 3 to 6 weeks and is usually not a serious illness  Your cough can last for several weeks  DISCHARGE INSTRUCTIONS:   Return to the emergency department if:   · You cough up blood  · Your lips or fingernails turn blue  · You feel like you are not getting enough air when you breathe  Contact your healthcare provider if:   · You have a fever  · Your breathing problems do not go away or get worse  · Your cough does not get better within 4 weeks  · You have questions or concerns about your condition or care  Self-care:   · Get more rest   Rest helps your body to heal  Slowly start to do more each day  Rest when you feel it is needed  · Avoid irritants in the air  Avoid chemicals, fumes, and dust  Wear a face mask if you must work around dust or fumes  Stay inside on days when air pollution levels are high  If you have allergies, stay inside when pollen counts are high  Do not use aerosol products, such as spray-on deodorant, bug spray, and hair spray  · Do not smoke or be around others who smoke  Nicotine and other chemicals in cigarettes and cigars damages the cilia that move mucus out of your lungs  Ask your healthcare provider for information if you currently smoke and need help to quit  E-cigarettes or smokeless tobacco still contain nicotine  Talk to your healthcare provider before you use these products  · Drink liquids as directed  Liquids help keep your air passages moist and help you cough up mucus   You may need to drink more liquids when you have acute Seen 2 23  Dx conjunctivitis  Given ointment  Mom has not been able to administer  Child fights against her and she is worried that she will injure her eye with the metal tube  Asking that it be changed to eye drops  Both eyes with discharge and continue to be pasted after sleep  Afebrile  Denies other symptoms  No apparent pain  Eye drops sent to pharmacy   Mom instructed on use of same  Disc s/s warranting eval  To call as needed  bronchitis  Ask how much liquid to drink each day and which liquids are best for you  · Use a humidifier or vaporizer  Use a cool mist humidifier or a vaporizer to increase air moisture in your home  This may make it easier for you to breathe and help decrease your cough  Decrease risk for acute bronchitis:   · Get the vaccinations you need  Ask your healthcare provider if you should get vaccinated against the flu or pneumonia  · Prevent the spread of germs  You can decrease your risk of acute bronchitis and other illnesses by doing the following:     [facility] your hands often with soap and water  Carry germ-killing hand lotion or gel with you  You can use the lotion or gel to clean your hands when soap and water are not available  ¨ Do not touch your eyes, nose, or mouth unless you have washed your hands first     ¨ Always cover your mouth when you cough to prevent the spread of germs  It is best to cough into a tissue or your shirt sleeve instead of into your hand  Ask those around you cover their mouths when they cough  ¨ Try to avoid people who have a cold or the flu  If you are sick, stay away from others as much as possible  Medicines: Your healthcare provider may  give you any of the following:  · Ibuprofen or acetaminophen  are medicines that help lower your fever  They are available without a doctor's order  Ask your healthcare provider which medicine is right for you  Ask how much to take and how often to take it  Follow directions  These medicines can cause stomach bleeding if not taken correctly  Ibuprofen can cause kidney damage  Do not take ibuprofen if you have kidney disease, an ulcer, or allergies to aspirin  Acetaminophen can cause liver damage  Do not take more than 4,000 milligrams in 24 hours  · Decongestants  help loosen mucus in your lungs and make it easier to cough up  This can help you breathe easier  · Cough suppressants  decrease your urge to cough   If your cough produces mucus, do not take a cough suppressant unless your healthcare provider tells you to  Your healthcare provider may suggest that you take a cough suppressant at night so you can rest     · Inhalers  may be given  Your healthcare provider may give you one or more inhalers to help you breathe easier and cough less  An inhaler gives your medicine to open your airways  Ask your healthcare provider to show you how to use your inhaler correctly  · Take your medicine as directed  Contact your healthcare provider if you think your medicine is not helping or if you have side effects  Tell him of her if you are allergic to any medicine  Keep a list of the medicines, vitamins, and herbs you take  Include the amounts, and when and why you take them  Bring the list or the pill bottles to follow-up visits  Carry your medicine list with you in case of an emergency  Follow up with your healthcare provider as directed:  Write down questions you have so you will remember to ask them during your follow-up visits  © 2017 [address] Information is for End User's use only and may not be sold, redistributed or otherwise used for commercial purposes  All illustrations and images included in CareNotes® are the copyrighted property of [employer]  or Rakesh Sánchez  The above information is an  only  It is not intended as medical advice for individual conditions or treatments  Talk to your doctor, nurse or pharmacist before following any medical regimen to see if it is safe and effective for you

## 2022-06-17 ENCOUNTER — TELEPHONE (OUTPATIENT)
Dept: PSYCHIATRY | Facility: CLINIC | Age: 62
End: 2022-06-17

## 2025-07-11 NOTE — TELEPHONE ENCOUNTER
I called the patient discussed with her yes we are able to fill her medication until she finds a new psychiatrist
Patient would like to know if you would be willing to write her prescriptions for her antidepressants, her psychiatrist will be retiring and she needs to know if you would be willing to do this  Patient has an appointment with her psychiatrist on Monday and needs to know before then  She can be reached at [phone] 
done
No

## (undated) DEVICE — WET SKIN PREP TRAY: Brand: MEDLINE INDUSTRIES, INC.

## (undated) DEVICE — CURITY IDOFORM PACKING STRIP: Brand: CURITY

## (undated) DEVICE — STANDARD SURGICAL GOWN, L: Brand: CONVERTORS

## (undated) DEVICE — CURITY NON-ADHERENT STRIPS: Brand: CURITY

## (undated) DEVICE — STRL PENROSE DRAIN 18" X 1/4": Brand: CARDINAL HEALTH

## (undated) DEVICE — ACE WRAP 4 IN UNSTERILE

## (undated) DEVICE — INTENDED FOR TISSUE SEPARATION, AND OTHER PROCEDURES THAT REQUIRE A SHARP SURGICAL BLADE TO PUNCTURE OR CUT.: Brand: BARD-PARKER SAFETY BLADES SIZE 15, STERILE

## (undated) DEVICE — ASTOUND STANDARD SURGICAL GOWN, XXL: Brand: CONVERTORS

## (undated) DEVICE — GLOVE SRG BIOGEL 9

## (undated) DEVICE — THE SIMPULSE SOLO SYSTEM WITH ULTREX RETRACTABLE SPLASH SHIELD TIP: Brand: SIMPULSE SOLO

## (undated) DEVICE — CAST PADDING 3 IN UNSTERILE

## (undated) DEVICE — STERILE SLQ FOOT ANKLE PACK: Brand: CARDINAL HEALTH